# Patient Record
Sex: FEMALE | Race: WHITE | Employment: UNEMPLOYED | URBAN - METROPOLITAN AREA
[De-identification: names, ages, dates, MRNs, and addresses within clinical notes are randomized per-mention and may not be internally consistent; named-entity substitution may affect disease eponyms.]

---

## 2017-01-23 ENCOUNTER — PATIENT MESSAGE (OUTPATIENT)
Dept: FAMILY MEDICINE CLINIC | Facility: CLINIC | Age: 52
End: 2017-01-23

## 2017-01-23 RX ORDER — ALPRAZOLAM 0.5 MG/1
TABLET ORAL 2 TIMES DAILY PRN
Qty: 30 TABLET | Refills: 2 | Status: SHIPPED | OUTPATIENT
Start: 2017-01-23 | End: 2017-03-22

## 2017-01-23 NOTE — TELEPHONE ENCOUNTER
----- Message from My[facility] Generic sent at 1/23/2017  8:39 AM CST -----  Regarding: Non-Urgent Medical Question  Contact: [phone]  I am ready for my Xanax refill however I have to go through [facility] now.  It's located at [address] phone num

## 2017-02-09 DIAGNOSIS — I10 ESSENTIAL HYPERTENSION: Primary | ICD-10-CM

## 2017-02-09 DIAGNOSIS — E66.9 OBESITY (BMI 30.0-34.9): ICD-10-CM

## 2017-02-09 RX ORDER — LOSARTAN POTASSIUM 100 MG/1
100 TABLET ORAL DAILY
Qty: 30 TABLET | Refills: 0 | Status: SHIPPED | OUTPATIENT
Start: 2017-02-09 | End: 2017-03-09

## 2017-02-09 RX ORDER — HYDROCHLOROTHIAZIDE 12.5 MG/1
CAPSULE, GELATIN COATED ORAL
Refills: 1 | COMMUNITY
Start: 2017-01-25 | End: 2017-03-27

## 2017-02-09 NOTE — TELEPHONE ENCOUNTER
Losartan approved qty 30 NR  Darlyn,patient failed refill protocol because last labs completed 2014.   Please advise labs

## 2017-02-10 ENCOUNTER — TELEPHONE (OUTPATIENT)
Dept: FAMILY MEDICINE CLINIC | Facility: CLINIC | Age: 52
End: 2017-02-10

## 2017-02-10 NOTE — TELEPHONE ENCOUNTER
Pt is having a diverticulitis flare up and wants to make sure she doesn't need an antibiotic. She has been having left lower pain and pressure. Pt was not able to go to the bathroom so she gave herself an enema and is now going water.   Pt was switched to

## 2017-02-10 NOTE — TELEPHONE ENCOUNTER
Spoke to patient with instructions. She states that she seems to be better as the abdominal pressure seems to be better as she can feel the liquids coming out. She did make an appt for Tuesday 2/14 in case she does not get better.   If her symptoms do get

## 2017-02-10 NOTE — TELEPHONE ENCOUNTER
She would need to be seen if she has diverticulitis to be started on the right antibiotics may also need a CT scan if provider feels necessary.

## 2017-02-21 ENCOUNTER — HOSPITAL ENCOUNTER (OUTPATIENT)
Age: 52
Discharge: HOME | End: 2017-02-21
Attending: FAMILY MEDICINE
Payer: COMMERCIAL

## 2017-02-21 ENCOUNTER — APPOINTMENT (OUTPATIENT)
Dept: CT IMAGING | Age: 52
End: 2017-02-21
Attending: FAMILY MEDICINE
Payer: COMMERCIAL

## 2017-02-21 VITALS
BODY MASS INDEX: 28.17 KG/M2 | WEIGHT: 165 LBS | DIASTOLIC BLOOD PRESSURE: 87 MMHG | OXYGEN SATURATION: 97 % | RESPIRATION RATE: 18 BRPM | HEIGHT: 64 IN | HEART RATE: 88 BPM | SYSTOLIC BLOOD PRESSURE: 130 MMHG | TEMPERATURE: 99 F

## 2017-02-21 DIAGNOSIS — R11.0 NAUSEA: ICD-10-CM

## 2017-02-21 DIAGNOSIS — K57.92 ACUTE DIVERTICULITIS: Primary | ICD-10-CM

## 2017-02-21 DIAGNOSIS — R10.32 LLQ ABDOMINAL PAIN: ICD-10-CM

## 2017-02-21 DIAGNOSIS — E87.6 HYPOKALEMIA: ICD-10-CM

## 2017-02-21 LAB
#LYMPHOCYTE IC: 1.2 X10ˆ3/UL (ref 0.9–3.2)
#MXD IC: 0.7 X10ˆ3/UL (ref 0.1–1)
#NEUTROPHIL IC: 8.3 X10ˆ3/UL (ref 1.3–6.7)
CREAT SERPL-MCNC: 0.7 MG/DL (ref 0.4–1)
GLUCOSE BLD-MCNC: 133 MG/DL (ref 65–99)
HCT IC: 41.5 % (ref 37–54)
HGB IC: 15.1 G/DL (ref 11.7–16)
ISTAT BLOOD GAS TCO2: 24 MMOL/L (ref 22–32)
ISTAT BUN: <3 MG/DL (ref 8–20)
ISTAT CHLORIDE: 95 MMOL/L (ref 101–111)
ISTAT HEMATOCRIT: 42 % (ref 37–54)
ISTAT IONIZED CALCIUM: 1.13 MMOL/L (ref 1.12–1.32)
ISTAT POTASSIUM: 3.4 MMOL/L (ref 3.6–5.1)
ISTAT SODIUM: 135 MMOL/L (ref 136–144)
MCH IC: 34.7 PG (ref 27–33.2)
MCHC IC: 36.4 G/DL (ref 31–37)
MCV IC: 95.4 FL (ref 81–100)
PLT IC: 322 X10ˆ3/UL (ref 150–450)
POCT BILIRUBIN URINE: NEGATIVE
POCT GLUCOSE URINE: NEGATIVE MG/DL
POCT KETONE URINE: NEGATIVE MG/DL
POCT LEUKOCYTE ESTERASE URINE: NEGATIVE
POCT NITRITE URINE: NEGATIVE
POCT PH URINE: 6.5 (ref 5–8)
POCT PROTEIN URINE: NEGATIVE MG/DL
POCT SPECIFIC GRAVITY URINE: 1.01
POCT URINE COLOR: YELLOW
POCT UROBILINOGEN URINE: 0.2 MG/DL
RBC IC: 4.35 X10ˆ6/UL (ref 3.8–5.1)
WBC IC: 10.2 X10ˆ3/UL (ref 4–13)

## 2017-02-21 PROCEDURE — 85025 COMPLETE CBC W/AUTO DIFF WBC: CPT | Performed by: FAMILY MEDICINE

## 2017-02-21 PROCEDURE — 80047 BASIC METABLC PNL IONIZED CA: CPT

## 2017-02-21 PROCEDURE — 96361 HYDRATE IV INFUSION ADD-ON: CPT

## 2017-02-21 PROCEDURE — 96374 THER/PROPH/DIAG INJ IV PUSH: CPT

## 2017-02-21 PROCEDURE — 74176 CT ABD & PELVIS W/O CONTRAST: CPT

## 2017-02-21 PROCEDURE — 81002 URINALYSIS NONAUTO W/O SCOPE: CPT | Performed by: FAMILY MEDICINE

## 2017-02-21 PROCEDURE — 99215 OFFICE O/P EST HI 40 MIN: CPT

## 2017-02-21 PROCEDURE — 99214 OFFICE O/P EST MOD 30 MIN: CPT

## 2017-02-21 RX ORDER — POTASSIUM CHLORIDE 20 MEQ/1
20 TABLET, EXTENDED RELEASE ORAL ONCE
Status: COMPLETED | OUTPATIENT
Start: 2017-02-21 | End: 2017-02-21

## 2017-02-21 RX ORDER — METRONIDAZOLE 500 MG/1
500 TABLET ORAL 2 TIMES DAILY
Qty: 20 TABLET | Refills: 0 | Status: SHIPPED | OUTPATIENT
Start: 2017-02-21 | End: 2017-04-06 | Stop reason: ALTCHOICE

## 2017-02-21 RX ORDER — KETOROLAC TROMETHAMINE 30 MG/ML
30 INJECTION, SOLUTION INTRAMUSCULAR; INTRAVENOUS ONCE
Status: COMPLETED | OUTPATIENT
Start: 2017-02-21 | End: 2017-02-21

## 2017-02-21 RX ORDER — POTASSIUM CHLORIDE 20 MEQ/1
20 TABLET, EXTENDED RELEASE ORAL DAILY
Status: DISCONTINUED | OUTPATIENT
Start: 2017-02-21 | End: 2017-02-21

## 2017-02-21 RX ORDER — CIPROFLOXACIN 500 MG/1
500 TABLET, FILM COATED ORAL 2 TIMES DAILY
Qty: 20 TABLET | Refills: 0 | Status: SHIPPED | OUTPATIENT
Start: 2017-02-21 | End: 2017-04-06 | Stop reason: ALTCHOICE

## 2017-02-21 RX ORDER — SODIUM CHLORIDE 9 MG/ML
1000 INJECTION, SOLUTION INTRAVENOUS ONCE
Status: COMPLETED | OUTPATIENT
Start: 2017-02-21 | End: 2017-02-21

## 2017-02-21 RX ORDER — ONDANSETRON 4 MG/1
4 TABLET, ORALLY DISINTEGRATING ORAL ONCE
Status: COMPLETED | OUTPATIENT
Start: 2017-02-21 | End: 2017-02-21

## 2017-02-21 RX ORDER — POTASSIUM CHLORIDE 20 MEQ/1
20 TABLET, EXTENDED RELEASE ORAL DAILY
Qty: 7 TABLET | Refills: 0 | Status: SHIPPED | OUTPATIENT
Start: 2017-02-21 | End: 2017-04-06 | Stop reason: ALTCHOICE

## 2017-02-21 NOTE — ED NOTES
Pt back from ct  Warm blanket and pillow provided. Pt made comfortable. Bed on low fowlers. Lights dimmed. 1 side rail up. Call light within reach.

## 2017-02-21 NOTE — ED PROVIDER NOTES
Patient Seen in: THE [facility] In [facility]    History   Patient presents with:  Abdominal Pain    Stated Complaint: 2 WKS DIVERTICULITIS     HPI    This 49-year-old female presents to the office with left lower quadrant abdominal pain which ha Comment trauma to right leg    CHOLECYSTECTOMY      COLONOSCOPY         Medications :   Ciprofloxacin HCl (CIPRO) 500 MG Oral Tab,  Take 1 tablet (500 mg total) by mouth 2 (two) times daily. TAKE ONE TAB TWICE DAILY WITH FOOD.    metRONIDAZOLE (FLAGYL) 500 and vital signs reviewed. All other systems reviewed and negative except as noted above. PSFH elements reviewed from today and agreed except as otherwise stated in HPI.     Physical Exam       ED Triage Vitals   BP 02/21/17 0812 148/92 mmHg   Pulse limits   Ct Abdomen+pelvis(cpt=74176)    2/21/2017  PROCEDURE:  CT ABDOMEN+PELVIS(CPT=74176)  COMPARISON:  [facility] CT ABDOMEN+PELVIS(CPT=74176), 6/08/2016, 10:34.   INDICATIONS:  2 WKS DIVERTICULITIS  TECHNIQUE:  Unenhanced multislice CT scanning was p disease. OTHER:  Negative.        2/21/2017  CONCLUSION:  There is a 8 to 10centimeter segment of acute diverticulitis involving the distal descending colon and sigmoid colon with inflammation of the pericolonic fat with some phlegmonous formation without total) by mouth 2 (two) times daily. TAKE ONE TAB TWICE DAILY WITH FOOD. Qty: 20 tablet Refills: 0  Associated Diagnoses:Acute diverticulitis    metRONIDAZOLE (FLAGYL) 500 MG Oral Tab  Take 1 tablet (500 mg total) by mouth 2 (two) times daily.  DO NOT DRIN

## 2017-02-21 NOTE — ED INITIAL ASSESSMENT (HPI)
Abdomen- cramping pain on and off x 2 weeks; pt states she was on liquid diet. Feels nauseous, and has diarrhea; denies vomiting, fever, pt took prune juice and laxative.  Pt has h/o diverticulitis she she states she was seen here and was diagnosed with laura

## 2017-02-24 ENCOUNTER — OFFICE VISIT (OUTPATIENT)
Dept: FAMILY MEDICINE CLINIC | Facility: CLINIC | Age: 52
End: 2017-02-24

## 2017-02-24 VITALS
WEIGHT: 170 LBS | RESPIRATION RATE: 16 BRPM | BODY MASS INDEX: 28.67 KG/M2 | HEART RATE: 96 BPM | SYSTOLIC BLOOD PRESSURE: 130 MMHG | DIASTOLIC BLOOD PRESSURE: 84 MMHG | HEIGHT: 64.5 IN

## 2017-02-24 DIAGNOSIS — K57.32 DIVERTICULITIS OF LARGE INTESTINE WITHOUT PERFORATION OR ABSCESS WITHOUT BLEEDING: Primary | ICD-10-CM

## 2017-02-24 PROCEDURE — 99213 OFFICE O/P EST LOW 20 MIN: CPT | Performed by: FAMILY MEDICINE

## 2017-02-24 NOTE — PROGRESS NOTES
Tri Saini is a 46year old female here for Patient presents with:  Urgent Care F/u: The patient went to the immediate care on 02/21/2017 for diverticulitis. The patient needs a note to return to work on 02/27/2017.       HPI:     Diverticulitis  -d Status: Never Used                        Alcohol Use: No                 Medications (Active prior to today's visit):    Current Outpatient Prescriptions:  Ciprofloxacin HCl (CIPRO) 500 MG Oral Tab Take 1 tablet (500 mg total) by mouth 2 (two) times daily dane, +BS  Ext: full ROM     ASSESSMENT/PLAN:     Diverticulitis  -resolving  -complete abx as prescribed  -advance diet as tolerated  -cleared to return to work next week  -f/u prn    2/24/2017  Torito Holcomb MD

## 2017-03-09 RX ORDER — LOSARTAN POTASSIUM 100 MG/1
TABLET ORAL
Qty: 30 TABLET | Refills: 0 | Status: SHIPPED | OUTPATIENT
Start: 2017-03-09 | End: 2017-04-05

## 2017-03-22 NOTE — TELEPHONE ENCOUNTER
[id number] Leila Fried with this refill? Last seen 2/2017 with Dr. Artis Cohn and prior to that in Nov. 2016 with you    Last refill  1/23/17 for 30 with 2 refills.

## 2017-03-23 RX ORDER — ALPRAZOLAM 0.5 MG/1
TABLET ORAL 2 TIMES DAILY PRN
Qty: 30 TABLET | Refills: 0 | Status: SHIPPED | OUTPATIENT
Start: 2017-03-23 | End: 2017-04-06

## 2017-03-23 NOTE — TELEPHONE ENCOUNTER
Musa Aguilar approved #30. Pt needs appt for any further refills. Med called to pharmacy and pt informed.   appt made for 4/6/17

## 2017-03-27 RX ORDER — HYDROCHLOROTHIAZIDE 12.5 MG/1
CAPSULE, GELATIN COATED ORAL
Qty: 30 CAPSULE | Refills: 0 | Status: SHIPPED | OUTPATIENT
Start: 2017-03-27 | End: 2017-04-06

## 2017-03-27 NOTE — TELEPHONE ENCOUNTER
HCTZ approved qty 30 NR  Patient overdue to complete labs  Overdue result letter has been mailed to patient

## 2017-04-05 RX ORDER — LOSARTAN POTASSIUM 100 MG/1
TABLET ORAL
Qty: 30 TABLET | Refills: 0 | Status: SHIPPED | OUTPATIENT
Start: 2017-04-05 | End: 2017-04-06

## 2017-04-06 NOTE — PROGRESS NOTES
Gloria Palmer is a 46year old female. HPI:   Patient presents with: Anxiety: Rm. 8: Alprazolam   patient is in for follow-up on anxiety. Does state that the anxiety has been worse again due to life stressors.   Is still going through issues 2 years mmol/L 24       Current Outpatient Prescriptions:  BusPIRone HCl 10 MG Oral Tab Take 5 mg twice daily for 5 days then increase to 10 mg for 5 days if needed after 5 days increase to 15 mg twice daily Disp: 60 tablet Rfl: 3   ALPRAZolam 0.5 MG Oral Tab Take shortness of breath with exertion  CARDIOVASCULAR: denies chest pain on exertion  GI: denies abdominal pain and denies heartburn  NEURO: denies headaches  Musculoskeletal: No motor deficits  psych see above  EXAM:   /88 mmHg  Pulse 84  Ht 64.5\"  Wt follow-up in the office in 3 months to assess success on BuSpar call office sooner if side effects or if no improvement.   Continue with Lexapro 20 mg daily  Discussed if any problems call office immediately especially if gets increased depression, anxiety

## 2017-04-09 ENCOUNTER — HOSPITAL ENCOUNTER (EMERGENCY)
Age: 52
Discharge: LEFT BEFORE DISCHARGE | End: 2017-04-09
Attending: EMERGENCY MEDICINE
Payer: COMMERCIAL

## 2017-04-09 ENCOUNTER — TELEPHONE (OUTPATIENT)
Dept: FAMILY MEDICINE CLINIC | Facility: CLINIC | Age: 52
End: 2017-04-09

## 2017-04-09 ENCOUNTER — APPOINTMENT (OUTPATIENT)
Dept: CT IMAGING | Age: 52
End: 2017-04-09
Attending: EMERGENCY MEDICINE
Payer: COMMERCIAL

## 2017-04-09 VITALS
SYSTOLIC BLOOD PRESSURE: 140 MMHG | RESPIRATION RATE: 16 BRPM | TEMPERATURE: 98 F | HEART RATE: 96 BPM | OXYGEN SATURATION: 96 % | DIASTOLIC BLOOD PRESSURE: 93 MMHG | WEIGHT: 167 LBS | BODY MASS INDEX: 28.51 KG/M2 | HEIGHT: 64 IN

## 2017-04-09 DIAGNOSIS — K57.32 DIVERTICULITIS OF LARGE INTESTINE WITHOUT PERFORATION OR ABSCESS WITHOUT BLEEDING: Primary | ICD-10-CM

## 2017-04-09 PROCEDURE — 96374 THER/PROPH/DIAG INJ IV PUSH: CPT

## 2017-04-09 PROCEDURE — 74177 CT ABD & PELVIS W/CONTRAST: CPT

## 2017-04-09 PROCEDURE — 81025 URINE PREGNANCY TEST: CPT

## 2017-04-09 PROCEDURE — 99285 EMERGENCY DEPT VISIT HI MDM: CPT

## 2017-04-09 PROCEDURE — 81003 URINALYSIS AUTO W/O SCOPE: CPT | Performed by: EMERGENCY MEDICINE

## 2017-04-09 PROCEDURE — 80053 COMPREHEN METABOLIC PANEL: CPT | Performed by: EMERGENCY MEDICINE

## 2017-04-09 PROCEDURE — 99284 EMERGENCY DEPT VISIT MOD MDM: CPT

## 2017-04-09 PROCEDURE — 85730 THROMBOPLASTIN TIME PARTIAL: CPT | Performed by: EMERGENCY MEDICINE

## 2017-04-09 PROCEDURE — 85610 PROTHROMBIN TIME: CPT | Performed by: EMERGENCY MEDICINE

## 2017-04-09 PROCEDURE — 85025 COMPLETE CBC W/AUTO DIFF WBC: CPT | Performed by: EMERGENCY MEDICINE

## 2017-04-09 PROCEDURE — 96361 HYDRATE IV INFUSION ADD-ON: CPT

## 2017-04-09 RX ORDER — METRONIDAZOLE 500 MG/1
500 TABLET ORAL 3 TIMES DAILY
Qty: 30 TABLET | Refills: 0 | Status: SHIPPED | OUTPATIENT
Start: 2017-04-09 | End: 2017-04-19

## 2017-04-09 RX ORDER — DICYCLOMINE HCL 20 MG
20 TABLET ORAL 4 TIMES DAILY PRN
Qty: 15 TABLET | Refills: 0 | Status: SHIPPED | OUTPATIENT
Start: 2017-04-09 | End: 2017-10-03 | Stop reason: ALTCHOICE

## 2017-04-09 RX ORDER — CIPROFLOXACIN 500 MG/1
500 TABLET, FILM COATED ORAL 2 TIMES DAILY
Qty: 20 TABLET | Refills: 0 | Status: SHIPPED | OUTPATIENT
Start: 2017-04-09 | End: 2017-04-19

## 2017-04-09 RX ORDER — POTASSIUM CHLORIDE 20 MEQ/1
40 TABLET, EXTENDED RELEASE ORAL ONCE
Status: COMPLETED | OUTPATIENT
Start: 2017-04-09 | End: 2017-04-09

## 2017-04-09 RX ORDER — SODIUM CHLORIDE 9 MG/ML
INJECTION, SOLUTION INTRAVENOUS ONCE
Status: COMPLETED | OUTPATIENT
Start: 2017-04-09 | End: 2017-04-09

## 2017-04-09 RX ORDER — KETOROLAC TROMETHAMINE 30 MG/ML
30 INJECTION, SOLUTION INTRAMUSCULAR; INTRAVENOUS ONCE
Status: COMPLETED | OUTPATIENT
Start: 2017-04-09 | End: 2017-04-09

## 2017-04-09 NOTE — TELEPHONE ENCOUNTER
Dr. Warren Camacho on call for [facility]:  Pt called from [phone] with concerns for diverticular attack and wanted advice as to what to do. Pt advised since it is a Sunday, she is to go to ER closest to her.    Explained diverticula can rupture and

## 2017-04-09 NOTE — ED PROVIDER NOTES
[facility] [place]    History   Patient presents with:  Abdomen/Flank Pain (GI/)    Stated Complaint: ABD PAIN    HPI    Patient is a 14-year-old female who presents emergency room with a history of left lower qu Medications :   Ciprofloxacin HCl 500 MG Oral Tab,  Take 1 tablet (500 mg total) by mouth 2 (two) times daily. metRONIDAZOLE 500 MG Oral Tab,  Take 1 tablet (500 mg total) by mouth 3 (three) times daily.    Dicyclomine HCl 20 MG Oral Tab,  Take 1 ta Exam       ED Triage Vitals   BP 04/09/17 1332 156/100 mmHg   Pulse 04/09/17 1332 100   Resp 04/09/17 1332 20   Temp 04/09/17 1332 97.7 °F (36.5 °C)   Temp src 04/09/17 1332 Oral   SpO2 04/09/17 1332 97 %   O2 Device 04/09/17 1332 None (Room air)       Cur within normal limits   CBC W/ DIFFERENTIAL - Abnormal; Notable for the following:     MCH 35.2 (*)     MCHC 37.1 (*)     Neutrophil Absolute Prelim 9.89 (*)     Neutrophil Absolute 9.89 (*)     Monocyte Absolute 0.63 (*)     All other components within nor even death. The patient was discharged home [address] at this time. The patient will be discharged home will follow-up with gastroenterology this week and Dr. Matthias Stubbs, will  help make arrangements for follow-up this week.   Instructed that the

## 2017-04-10 NOTE — TELEPHONE ENCOUNTER
Spoke to patient and she said that the ER doctor wants her to set up an appt with GI/Dr. Bradly Beckman this week. Also, gave her the number to Dr. Kalani Vargas as well.

## 2017-04-10 NOTE — TELEPHONE ENCOUNTER
Refer her to general surgery Dr Neela Spears she may need resection due to the # of diverticulitis she had this year he can do colonoscopy and also the surgery if it is needed.

## 2017-04-24 RX ORDER — OMEPRAZOLE 20 MG/1
CAPSULE, DELAYED RELEASE ORAL
Qty: 30 CAPSULE | Refills: 0 | Status: SHIPPED | OUTPATIENT
Start: 2017-04-24 | End: 2017-05-17

## 2017-04-26 RX ORDER — TRAZODONE HYDROCHLORIDE 150 MG/1
TABLET ORAL
Qty: 30 TABLET | Refills: 2 | Status: SHIPPED | OUTPATIENT
Start: 2017-04-26 | End: 2017-07-27

## 2017-05-01 ENCOUNTER — PATIENT MESSAGE (OUTPATIENT)
Dept: FAMILY MEDICINE CLINIC | Facility: CLINIC | Age: 52
End: 2017-05-01

## 2017-05-01 RX ORDER — ESCITALOPRAM OXALATE 20 MG/1
TABLET ORAL
Qty: 90 TABLET | Refills: 0 | Status: SHIPPED | OUTPATIENT
Start: 2017-05-01 | End: 2017-08-08

## 2017-05-02 RX ORDER — LOSARTAN POTASSIUM 100 MG/1
TABLET ORAL
Qty: 30 TABLET | Refills: 0 | OUTPATIENT
Start: 2017-05-02

## 2017-05-04 RX ORDER — ALPRAZOLAM 0.5 MG/1
TABLET ORAL 2 TIMES DAILY PRN
Qty: 45 TABLET | Refills: 0 | OUTPATIENT
Start: 2017-05-04 | End: 2017-05-25

## 2017-05-04 NOTE — TELEPHONE ENCOUNTER
----- Message from Rolo Willson PA-C sent at 5/4/2017  2:57 PM CDT -----  Regarding: FW: Non-Urgent Medical Question  Contact: [phone]  Refill the Xanax for the next month #45  ----- Message -----     From: Caprice Franks RN     Sent: 5/4/2017   7: Jeovanny Postin     Sent: 5/1/2017 12:47 PM CDT       To: Leeann Escobar PA-C  Subject: Non-Urgent Medical Question    Tell Darshan Alvarez the pills she gave me to try and come of. Xanax  do not work. I was crawling out of my skin.  Xanax doesn't make me f

## 2017-05-04 NOTE — TELEPHONE ENCOUNTER
Med called to pharmacy. Pharmacy to notify pt when ready    Ok to discard the paper copy once approved. Thanks.

## 2017-05-05 RX ORDER — ALPRAZOLAM 0.5 MG/1
TABLET ORAL 2 TIMES DAILY PRN
Qty: 45 TABLET | Refills: 0 | OUTPATIENT
Start: 2017-05-05

## 2017-05-18 RX ORDER — OMEPRAZOLE 20 MG/1
CAPSULE, DELAYED RELEASE ORAL
Qty: 30 CAPSULE | Refills: 2 | Status: SHIPPED | OUTPATIENT
Start: 2017-05-18 | End: 2017-08-23

## 2017-05-25 RX ORDER — ALPRAZOLAM 0.5 MG/1
TABLET ORAL 2 TIMES DAILY PRN
Qty: 45 TABLET | Refills: 0 | Status: SHIPPED | OUTPATIENT
Start: 2017-05-25 | End: 2017-06-15

## 2017-05-25 NOTE — TELEPHONE ENCOUNTER
From: Valentina Fish  To:  Del Hunter PA-C  Sent: 5/25/2017 7:56 AM CDT  Subject: Medication Renewal Request    Original authorizing provider: DAPHNE Gutierrez would like a refill of the following medications:  Ramon Chimera

## 2017-06-15 RX ORDER — ALPRAZOLAM 0.5 MG/1
TABLET ORAL 2 TIMES DAILY PRN
Qty: 45 TABLET | Refills: 0 | Status: SHIPPED | OUTPATIENT
Start: 2017-06-18 | End: 2017-07-17

## 2017-06-15 NOTE — TELEPHONE ENCOUNTER
I thought that was suppose to last a month is she taking it twice daily ? I really don't like to RX it that way it is suppose to be lowest dose possible.  What is the lowest possible she can do??   Clearify with her ok to refill for #45 but tell her it is

## 2017-06-15 NOTE — TELEPHONE ENCOUNTER
Spoke to patient and she said she has been taking it twice a day and that she usually take 2 in the AM and then she is good. She said she actually has 3 days left and will run out on 6/18.   Explained to patient that this next refill will be called in with

## 2017-07-17 RX ORDER — ALPRAZOLAM 0.5 MG/1
TABLET ORAL 2 TIMES DAILY PRN
Qty: 45 TABLET | Refills: 0
Start: 2017-07-17 | End: 2017-08-16

## 2017-07-17 NOTE — TELEPHONE ENCOUNTER
From: Carey Freitas<BR />Sent: 7/17/2017 12:38 PM CDT<BR />Subject: Medication Renewal Request<BR /><BR />June Son would like a refill of the following medications:<BR />ALPRAZolam 0.5 MG Oral Tab KENDRICK Saab<BR /><BR />Preferr

## 2017-07-27 RX ORDER — TRAZODONE HYDROCHLORIDE 150 MG/1
TABLET ORAL
Qty: 30 TABLET | Refills: 0 | Status: SHIPPED | OUTPATIENT
Start: 2017-07-27 | End: 2017-08-23

## 2017-07-27 NOTE — TELEPHONE ENCOUNTER
Trazodone approved qty 30 NR  Patient past due for a 3 month f/u for anxiety  Please call to schedule appt  [phone] (home)

## 2017-08-08 ENCOUNTER — TELEPHONE (OUTPATIENT)
Dept: FAMILY MEDICINE CLINIC | Facility: CLINIC | Age: 52
End: 2017-08-08

## 2017-08-08 RX ORDER — ESCITALOPRAM OXALATE 20 MG/1
20 TABLET ORAL
Qty: 90 TABLET | Refills: 0 | Status: SHIPPED | OUTPATIENT
Start: 2017-08-08 | End: 2017-11-17

## 2017-08-16 RX ORDER — ALPRAZOLAM 0.5 MG/1
TABLET ORAL 2 TIMES DAILY PRN
Qty: 45 TABLET | Refills: 0
Start: 2017-08-16 | End: 2017-09-14

## 2017-08-16 NOTE — TELEPHONE ENCOUNTER
From: Damaris Chapman  Sent: 8/16/2017 12:39 PM CDT  Subject: Medication Renewal Request    Damaris Chapman would like a refill of the following medications:  ALPRAZolam 0.5 MG Oral Tab Nury Downey PA-C]    Preferred pharmacy: [facility]

## 2017-08-21 NOTE — ED PROVIDER NOTES
Patient Seen in: [facility] Emergency Department In [place]    History   Patient presents with:   Other    Stated Complaint:     HPI    51-year-old female complaining of weakness the patient states that last night she vomited once before she went to bed sh Dr. Sandie Barboza; Due 5/14/2014    Medications :   ALPRAZolam 0.5 MG Oral Tab,  Take 0.5-1 tablets (0.25-0.5 mg total) by mouth 2 (two) times daily as needed for Anxiety. escitalopram 20 MG Oral Tab,  Take 1 tablet (20 mg total) by mouth once daily.    TR src: Temporal  SpO2: 95 %  O2 Device: None (Room air)    Current:/70   Pulse 75   Temp 98.6 °F (37 °C) (Temporal)   Resp 20   Ht 162.6 cm (5' 4\")   Wt 70.8 kg   SpO2 99%   BMI 26.78 kg/m²         Physical Exam    Alert and oriented ×3 in no acute di All other components within normal limits   CBC WITH DIFFERENTIAL WITH PLATELET    Narrative: The following orders were created for panel order CBC WITH DIFFERENTIAL WITH PLATELET.   Procedure                               Abnormality         Status [address]  [phone]    In 3 days        Medications Prescribed:  Discharge Medication List as of 8/21/2017  4:53 PM

## 2017-08-21 NOTE — ED INITIAL ASSESSMENT (HPI)
PT STATES THIS AM AND FELT LIKE BP WAS HIGH, FELT ANXIOUS AND GOT OUT OF BED AND FELT LIGHTHEADED AND STATES HAD SYNCOPAL EPISODE. NOW FEELING TIRED

## 2017-08-23 RX ORDER — OMEPRAZOLE 20 MG/1
CAPSULE, DELAYED RELEASE ORAL
Qty: 30 CAPSULE | Refills: 0 | Status: SHIPPED | OUTPATIENT
Start: 2017-08-23 | End: 2017-09-27

## 2017-08-23 RX ORDER — TRAZODONE HYDROCHLORIDE 150 MG/1
TABLET ORAL
Qty: 30 TABLET | Refills: 0 | Status: SHIPPED | OUTPATIENT
Start: 2017-08-23 | End: 2017-09-25

## 2017-08-29 ENCOUNTER — TELEPHONE (OUTPATIENT)
Dept: FAMILY MEDICINE CLINIC | Facility: CLINIC | Age: 52
End: 2017-08-29

## 2017-08-29 RX ORDER — ALBUTEROL SULFATE 90 UG/1
2 AEROSOL, METERED RESPIRATORY (INHALATION) EVERY 6 HOURS PRN
Qty: 18 G | Refills: 0 | Status: SHIPPED | OUTPATIENT
Start: 2017-08-29 | End: 2017-09-20

## 2017-08-29 NOTE — TELEPHONE ENCOUNTER
Per phone consent left message for patient that inhaler has been sent to her pharmacy and to also use plain mucinex OTC  Asked patient to call office with any questions she may have

## 2017-08-29 NOTE — TELEPHONE ENCOUNTER
Pt called and said she has burning in her chest when she coughs. She said she knows it will go into bronchitis and she wants to know if she can get something called in before her appt on Thursday.   She said she talked to [facility] yesterday but she woke up

## 2017-08-29 NOTE — TELEPHONE ENCOUNTER
Vincenzo Meredith you please recommend something that the patient can take OTC until she is seen on Thursday

## 2017-08-31 PROBLEM — F10.20 ALCOHOLIC (HCC): Status: ACTIVE | Noted: 2017-08-31

## 2017-08-31 NOTE — PROGRESS NOTES
Raquel Child is a 46year old female. HPI:   Patient presents for recheck of her hypertension.    Pt has been taking medications as instructed, no medication side effects, home BP monitoring not done  BP not taken high stress she is selling her ho 5.1 mmol/L   Chloride 92 (L) 101 - 111 mmol/L   CO2 25.0 22.0 - 32.0 mmol/L   -ETHYL ALCOHOL   Result Value Ref Range   Ethyl Alcohol 326 (H) <3 mg/dL   -D-DIMER   Result Value Ref Range   D-Dimer 0.52 (H) 0.00 - 0.49 ug/mL FEU   -RBC MORPHOLOGY SCAN   Res Albuterol Sulfate  (90 Base) MCG/ACT Inhalation Aero Soln Inhale 2 puffs into the lungs every 6 (six) hours as needed for Wheezing.  Disp: 18 g Rfl: 0   OMEPRAZOLE 20 MG Oral Capsule Delayed Release TAKE 1 CAPSULE BY MOUTH EVERY MORNING Disp: 30 ca ENDOSCOPY,BIOPSY      Comment: Dr. Tammie Bell; Due 5/14/2014   Social History:    Smoking status: Current Every Day Smoker                                                   Packs/day: 0.75      Years: 29.00        Types: Cigarettes  Smokeless tobacco: Ne repeated. GENERAL: normal communication, well developed, well nourished and in no apparent distress  SKIN: no visible rashes  HEENT ears clear bilaterally nose no congestion throat clear postnasal drainage no sinus pain with palpation of sinuses.   HEAD: a PANEL (14); Future    4. Acute bronchitis, unspecified organism  Flovent 2 puffs twice a day rinse mouth after use albuterol every 4 hours smoking cessation  Continue with Mucinex OTC    5. Essential hypertension  - COMP METABOLIC PANEL (14);  Future  - LIP

## 2017-09-07 ENCOUNTER — APPOINTMENT (OUTPATIENT)
Dept: GENERAL RADIOLOGY | Age: 52
End: 2017-09-07
Attending: EMERGENCY MEDICINE
Payer: COMMERCIAL

## 2017-09-07 ENCOUNTER — HOSPITAL ENCOUNTER (EMERGENCY)
Age: 52
Discharge: HOME | End: 2017-09-07
Attending: EMERGENCY MEDICINE
Payer: COMMERCIAL

## 2017-09-07 VITALS
DIASTOLIC BLOOD PRESSURE: 67 MMHG | HEIGHT: 64 IN | BODY MASS INDEX: 25.95 KG/M2 | OXYGEN SATURATION: 97 % | SYSTOLIC BLOOD PRESSURE: 102 MMHG | HEART RATE: 95 BPM | WEIGHT: 152 LBS | TEMPERATURE: 97 F | RESPIRATION RATE: 16 BRPM

## 2017-09-07 DIAGNOSIS — J40 BRONCHITIS: Primary | ICD-10-CM

## 2017-09-07 DIAGNOSIS — J98.01 ACUTE BRONCHOSPASM: ICD-10-CM

## 2017-09-07 PROCEDURE — 71020 XR CHEST PA + LAT CHEST (CPT=71020): CPT | Performed by: EMERGENCY MEDICINE

## 2017-09-07 PROCEDURE — 99283 EMERGENCY DEPT VISIT LOW MDM: CPT | Performed by: EMERGENCY MEDICINE

## 2017-09-07 PROCEDURE — 94640 AIRWAY INHALATION TREATMENT: CPT | Performed by: EMERGENCY MEDICINE

## 2017-09-07 PROCEDURE — 94640 AIRWAY INHALATION TREATMENT: CPT

## 2017-09-07 RX ORDER — IPRATROPIUM BROMIDE AND ALBUTEROL SULFATE 2.5; .5 MG/3ML; MG/3ML
3 SOLUTION RESPIRATORY (INHALATION) ONCE
Status: COMPLETED | OUTPATIENT
Start: 2017-09-07 | End: 2017-09-07

## 2017-09-07 RX ORDER — PREDNISONE 20 MG/1
60 TABLET ORAL ONCE
Status: COMPLETED | OUTPATIENT
Start: 2017-09-07 | End: 2017-09-07

## 2017-09-07 RX ORDER — PREDNISONE 20 MG/1
40 TABLET ORAL DAILY
Qty: 10 TABLET | Refills: 0 | Status: SHIPPED | OUTPATIENT
Start: 2017-09-07 | End: 2017-09-12

## 2017-09-07 RX ORDER — CLARITHROMYCIN 500 MG/1
500 TABLET, COATED ORAL 2 TIMES DAILY
Qty: 20 TABLET | Refills: 0 | Status: SHIPPED | OUTPATIENT
Start: 2017-09-07 | End: 2017-09-17

## 2017-09-07 NOTE — ED PROVIDER NOTES
Patient Seen in: [facility] Emergency Department In [place]    History   Patient presents with:  Dyspnea ROSA SOB (respiratory)    Stated Complaint: cough/feeling tired    HPI    Patient is a 77-year-old female who presents emergency room with a history of Disorder Mother      Crohns   • Cancer Maternal Grandmother      ovarian cancer   • Gastro-Intestinal Disorder Sister      diverticulitis   • Cancer Sister      cervical cancer   • Colon Cancer Maternal Grandfather        Smoking status: Current Every Day hernia. EXTREMITIES: There is no cyanosis, clubbing, or edema appreciated. Pulses are 2+ and equal in all 4 extremities. NEURO: Patient is awake, alert and oriented to time place and person. Motor strength is 5 over 5 in all 4 extremities.  There are no g R-0

## 2017-09-08 ENCOUNTER — HOSPITAL ENCOUNTER (EMERGENCY)
Age: 52
Discharge: HOME | End: 2017-09-08
Attending: EMERGENCY MEDICINE
Payer: COMMERCIAL

## 2017-09-08 ENCOUNTER — TELEPHONE (OUTPATIENT)
Dept: FAMILY MEDICINE CLINIC | Facility: CLINIC | Age: 52
End: 2017-09-08

## 2017-09-08 ENCOUNTER — PATIENT MESSAGE (OUTPATIENT)
Dept: FAMILY MEDICINE CLINIC | Facility: CLINIC | Age: 52
End: 2017-09-08

## 2017-09-08 VITALS
SYSTOLIC BLOOD PRESSURE: 128 MMHG | HEIGHT: 64 IN | OXYGEN SATURATION: 96 % | HEART RATE: 101 BPM | RESPIRATION RATE: 24 BRPM | TEMPERATURE: 98 F | BODY MASS INDEX: 25.95 KG/M2 | WEIGHT: 152 LBS | DIASTOLIC BLOOD PRESSURE: 93 MMHG

## 2017-09-08 DIAGNOSIS — J98.01 ACUTE BRONCHOSPASM: Primary | ICD-10-CM

## 2017-09-08 DIAGNOSIS — R11.10 NON-INTRACTABLE VOMITING, PRESENCE OF NAUSEA NOT SPECIFIED, UNSPECIFIED VOMITING TYPE: ICD-10-CM

## 2017-09-08 DIAGNOSIS — F10.10 ALCOHOL ABUSE: ICD-10-CM

## 2017-09-08 LAB
ALBUMIN SERPL-MCNC: 4.5 G/DL (ref 3.5–4.8)
ALP LIVER SERPL-CCNC: 117 U/L (ref 41–108)
ALT SERPL-CCNC: 94 U/L (ref 14–54)
AST SERPL-CCNC: 114 U/L (ref 15–41)
BASOPHILS # BLD AUTO: 0.02 X10(3) UL (ref 0–0.1)
BASOPHILS NFR BLD AUTO: 0.2 %
BILIRUB SERPL-MCNC: 1 MG/DL (ref 0.1–2)
BUN BLD-MCNC: 10 MG/DL (ref 8–20)
CALCIUM BLD-MCNC: 8.6 MG/DL (ref 8.3–10.3)
CHLORIDE: 89 MMOL/L (ref 101–111)
CO2: 24 MMOL/L (ref 22–32)
CREAT BLD-MCNC: 0.77 MG/DL (ref 0.55–1.02)
EOSINOPHIL # BLD AUTO: 0.01 X10(3) UL (ref 0–0.3)
EOSINOPHIL NFR BLD AUTO: 0.1 %
ERYTHROCYTE [DISTWIDTH] IN BLOOD BY AUTOMATED COUNT: 11.5 % (ref 11.5–16)
ETHYL ALCOHOL: 245 MG/DL (ref ?–3)
GLUCOSE BLD-MCNC: 102 MG/DL (ref 70–99)
HCT VFR BLD AUTO: 45.1 % (ref 34–50)
HGB BLD-MCNC: 16.4 G/DL (ref 12–16)
IMMATURE GRANULOCYTE COUNT: 0.02 X10(3) UL (ref 0–1)
IMMATURE GRANULOCYTE RATIO %: 0.2 %
LYMPHOCYTES # BLD AUTO: 1.72 X10(3) UL (ref 0.9–4)
LYMPHOCYTES NFR BLD AUTO: 19.8 %
M PROTEIN MFR SERPL ELPH: 7.6 G/DL (ref 6.1–8.3)
MCH RBC QN AUTO: 35 PG (ref 27–33.2)
MCHC RBC AUTO-ENTMCNC: 36.4 G/DL (ref 31–37)
MCV RBC AUTO: 96.4 FL (ref 81–100)
MONOCYTES # BLD AUTO: 0.57 X10(3) UL (ref 0.1–0.6)
MONOCYTES NFR BLD AUTO: 6.6 %
NEUTROPHIL ABS PRELIM: 6.33 X10 (3) UL (ref 1.3–6.7)
NEUTROPHILS # BLD AUTO: 6.33 X10(3) UL (ref 1.3–6.7)
NEUTROPHILS NFR BLD AUTO: 73.1 %
PLATELET # BLD AUTO: 246 10(3)UL (ref 150–450)
POTASSIUM SERPL-SCNC: 3.6 MMOL/L (ref 3.6–5.1)
RBC # BLD AUTO: 4.68 X10(6)UL (ref 3.8–5.1)
RED CELL DISTRIBUTION WIDTH-SD: 40.5 FL (ref 35.1–46.3)
SODIUM SERPL-SCNC: 130 MMOL/L (ref 136–144)
WBC # BLD AUTO: 8.7 X10(3) UL (ref 4–13)

## 2017-09-08 PROCEDURE — 85025 COMPLETE CBC W/AUTO DIFF WBC: CPT | Performed by: EMERGENCY MEDICINE

## 2017-09-08 PROCEDURE — 80053 COMPREHEN METABOLIC PANEL: CPT | Performed by: EMERGENCY MEDICINE

## 2017-09-08 PROCEDURE — 99283 EMERGENCY DEPT VISIT LOW MDM: CPT

## 2017-09-08 PROCEDURE — 96361 HYDRATE IV INFUSION ADD-ON: CPT

## 2017-09-08 PROCEDURE — 99284 EMERGENCY DEPT VISIT MOD MDM: CPT

## 2017-09-08 PROCEDURE — 94640 AIRWAY INHALATION TREATMENT: CPT

## 2017-09-08 PROCEDURE — 96374 THER/PROPH/DIAG INJ IV PUSH: CPT

## 2017-09-08 PROCEDURE — 80320 DRUG SCREEN QUANTALCOHOLS: CPT | Performed by: EMERGENCY MEDICINE

## 2017-09-08 RX ORDER — SODIUM CHLORIDE 9 MG/ML
INJECTION, SOLUTION INTRAVENOUS ONCE
Status: COMPLETED | OUTPATIENT
Start: 2017-09-08 | End: 2017-09-08

## 2017-09-08 RX ORDER — IPRATROPIUM BROMIDE AND ALBUTEROL SULFATE 2.5; .5 MG/3ML; MG/3ML
3 SOLUTION RESPIRATORY (INHALATION) ONCE
Status: COMPLETED | OUTPATIENT
Start: 2017-09-08 | End: 2017-09-08

## 2017-09-08 RX ORDER — ONDANSETRON 2 MG/ML
4 INJECTION INTRAMUSCULAR; INTRAVENOUS ONCE
Status: COMPLETED | OUTPATIENT
Start: 2017-09-08 | End: 2017-09-08

## 2017-09-08 RX ORDER — ONDANSETRON 4 MG/1
4 TABLET, ORALLY DISINTEGRATING ORAL EVERY 4 HOURS PRN
Qty: 10 TABLET | Refills: 0 | Status: SHIPPED | OUTPATIENT
Start: 2017-09-08 | End: 2017-09-15

## 2017-09-08 NOTE — ED INITIAL ASSESSMENT (HPI)
Cough/ROSA since Tuesday. Seen here yesterday and started antibiotic and prednisone. Sts she is not feeling any better. No fevers. Pt c/o vomiting x 1.

## 2017-09-08 NOTE — TELEPHONE ENCOUNTER
Monico Mathew is calling to let Lizzie Hortensia know that she sent a message through my chart she needs a note for work on Monday, or she may be fired because she is on no paid leave, please send letter to Monico Mathew through her e-mail on my chart message, any questions p

## 2017-09-08 NOTE — ED PROVIDER NOTES
Patient Seen in: [address] [facility] In [place]    History   Patient presents with:  Dyspnea BILL SOB (respiratory)    Stated Complaint: bill/cough- was seen here yesterday. on prednisone and antibiotic.     HPI    Patient is a 60-year-old female Comment: Dr. Candido Anthony; Due 5/14/2014    Family History   Problem Relation Age of Onset   • Diabetes Father    • Hypertension Father    • Hypertension Mother    • Gastro-Intestinal Disorder Mother      Crohns   • Cancer Maternal Grandmother      ovarian murmur. ABDOMEN: There is no focal tenderness to palpation appreciated anywhere throughout the abdomen. There is no guarding, no rebound, no mass, and no organomegaly appreciated. There is normoactive bowel sounds. There is no hernia.   EXTREMITIES: There a normal CBC, chemistries, BUN/creatinine, and blood sugar except for evidence of some mild hyponatremia which the patient has been known to have previously and is actually improved today from previous. Patient's alcohol level is 245.   Patient liver funct

## 2017-09-14 RX ORDER — ALPRAZOLAM 0.5 MG/1
TABLET ORAL 2 TIMES DAILY PRN
Qty: 45 TABLET | Refills: 0 | Status: SHIPPED | OUTPATIENT
Start: 2017-09-14 | End: 2017-10-17

## 2017-09-14 NOTE — TELEPHONE ENCOUNTER
From: Valentina Fish  Sent: 9/14/2017 10:25 AM CDT  Subject: Medication Renewal Request    Valentina Fish would like a refill of the following medications:     ALPRAZolam 0.5 MG Oral Tab Magaly Boogie PA-C]    Preferred pharmacy: [facility]

## 2017-09-20 RX ORDER — ALBUTEROL SULFATE 90 UG/1
AEROSOL, METERED RESPIRATORY (INHALATION)
Qty: 18 G | Refills: 0 | Status: SHIPPED | OUTPATIENT
Start: 2017-09-20 | End: 2017-10-03 | Stop reason: ALTCHOICE

## 2017-09-25 RX ORDER — TRAZODONE HYDROCHLORIDE 150 MG/1
TABLET ORAL
Qty: 30 TABLET | Refills: 0 | Status: SHIPPED | OUTPATIENT
Start: 2017-09-25 | End: 2017-11-07

## 2017-09-27 RX ORDER — DEXAMETHASONE 4 MG/1
TABLET ORAL
Qty: 12 G | Refills: 0 | Status: SHIPPED | OUTPATIENT
Start: 2017-09-27 | End: 2017-10-03 | Stop reason: ALTCHOICE

## 2017-09-27 RX ORDER — OMEPRAZOLE 20 MG/1
CAPSULE, DELAYED RELEASE ORAL
Qty: 30 CAPSULE | Refills: 2 | Status: SHIPPED | OUTPATIENT
Start: 2017-09-27 | End: 2018-01-09

## 2017-10-03 ENCOUNTER — OFFICE VISIT (OUTPATIENT)
Dept: FAMILY MEDICINE CLINIC | Facility: CLINIC | Age: 52
End: 2017-10-03

## 2017-10-03 VITALS
DIASTOLIC BLOOD PRESSURE: 88 MMHG | HEIGHT: 64 IN | WEIGHT: 156 LBS | HEART RATE: 104 BPM | BODY MASS INDEX: 26.63 KG/M2 | TEMPERATURE: 99 F | SYSTOLIC BLOOD PRESSURE: 120 MMHG

## 2017-10-03 DIAGNOSIS — Z23 NEED FOR VACCINATION: ICD-10-CM

## 2017-10-03 DIAGNOSIS — F43.0 PANIC ATTACK AS REACTION TO STRESS: ICD-10-CM

## 2017-10-03 DIAGNOSIS — F41.1 GENERALIZED ANXIETY DISORDER: ICD-10-CM

## 2017-10-03 DIAGNOSIS — F10.20 ALCOHOLIC (HCC): ICD-10-CM

## 2017-10-03 DIAGNOSIS — F41.0 PANIC ATTACK AS REACTION TO STRESS: ICD-10-CM

## 2017-10-03 DIAGNOSIS — I10 ESSENTIAL HYPERTENSION: Primary | ICD-10-CM

## 2017-10-03 PROCEDURE — 90471 IMMUNIZATION ADMIN: CPT | Performed by: FAMILY MEDICINE

## 2017-10-03 PROCEDURE — 99214 OFFICE O/P EST MOD 30 MIN: CPT | Performed by: FAMILY MEDICINE

## 2017-10-03 PROCEDURE — 90686 IIV4 VACC NO PRSV 0.5 ML IM: CPT | Performed by: FAMILY MEDICINE

## 2017-10-03 RX ORDER — PREDNISONE 20 MG/1
TABLET ORAL
Refills: 0 | COMMUNITY
Start: 2017-09-07 | End: 2017-10-03 | Stop reason: ALTCHOICE

## 2017-10-03 RX ORDER — CLARITHROMYCIN 500 MG/1
TABLET, COATED ORAL
Refills: 0 | COMMUNITY
Start: 2017-09-07 | End: 2017-10-03 | Stop reason: ALTCHOICE

## 2017-10-03 NOTE — PROGRESS NOTES
Masoud Estrada is a 46year old female. HPI:   Patient is in for follow-up on her generalized anxiety disorder and panic attacks. .  Patient states it got progressively worse in the last couple of weeks in which she developed worsening panic attacks, e Tab TAKE ONE-HALF TO ONE TABLET BY MOUTH EVERY NIGHT AS NEEDED FOR SLEEP Disp: 30 tablet Rfl: 0   ALPRAZolam 0.5 MG Oral Tab Take 0.5-1 tablets (0.25-0.5 mg total) by mouth 2 (two) times daily as needed for Anxiety.  Disp: 45 tablet Rfl: 0   escitalopram 20 Location: Left arm, Patient Position: Sitting, Cuff Size: adult)   Pulse 104   Temp 98.7 °F (37.1 °C) (Oral)   Ht 64\"   Wt 156 lb   BMI 26.78 kg/m²   GENERAL: well developed, well nourished,in no apparent distress  SKIN: no rashes,no suspicious lesions  E counseling. Continue with daily Lexapro and trazodone at night  Reviewed medication benefits and side effects. Discussed if any problems call office immediately especially if gets increased depression, anxiety or any homicidal or suicidal symptoms.    Ti

## 2017-10-04 ENCOUNTER — TELEPHONE (OUTPATIENT)
Dept: FAMILY MEDICINE CLINIC | Facility: CLINIC | Age: 52
End: 2017-10-04

## 2017-10-04 NOTE — TELEPHONE ENCOUNTER
The patient's completed \"Certification of Health Care Provider for Employee's Serious Health Condition\" form was successfully faxed to the attention of Human Resources at [phone].       The patient's completed \"Short Term Disability Claim Packet\"
no clubbing/no pedal edema/no cyanosis

## 2017-10-17 NOTE — TELEPHONE ENCOUNTER
From: Madelyn Steele  Sent: 10/17/2017 9:31 AM CDT  Subject: Medication Renewal Request    Madelyn Steele would like a refill of the following medications:     ALPRAZolam 0.5 MG Oral Tab Yahir Jonas PA-C]    Preferred pharmacy: [facility]

## 2017-10-18 RX ORDER — ALPRAZOLAM 0.5 MG/1
TABLET ORAL 2 TIMES DAILY PRN
Qty: 45 TABLET | Refills: 0
Start: 2017-10-18 | End: 2017-11-21

## 2017-10-18 NOTE — TELEPHONE ENCOUNTER
Rx has been phoned in to pharmacy qty 1200 Donny Fried left on pharmacy VM  Future Appointments  Date Time Provider Sasha Chen   10/20/2017 10:30 AM Dora Cantu PA-C [facility] [place]

## 2017-10-18 NOTE — TELEPHONE ENCOUNTER
Pt got #45 on 9/14/17.   Has an appt with Jane Moore on 10/20    Christi Friday to wait till her appt to discuss further

## 2017-10-20 ENCOUNTER — OFFICE VISIT (OUTPATIENT)
Dept: FAMILY MEDICINE CLINIC | Facility: CLINIC | Age: 52
End: 2017-10-20

## 2017-10-20 VITALS
WEIGHT: 159 LBS | HEIGHT: 64 IN | BODY MASS INDEX: 27.14 KG/M2 | DIASTOLIC BLOOD PRESSURE: 80 MMHG | HEART RATE: 112 BPM | SYSTOLIC BLOOD PRESSURE: 114 MMHG

## 2017-10-20 DIAGNOSIS — F41.1 ANXIETY IN ACUTE STRESS REACTION: ICD-10-CM

## 2017-10-20 DIAGNOSIS — F10.21 ALCOHOL DEPENDENCE IN EARLY FULL REMISSION (HCC): ICD-10-CM

## 2017-10-20 DIAGNOSIS — I10 ESSENTIAL HYPERTENSION: Primary | ICD-10-CM

## 2017-10-20 DIAGNOSIS — F43.0 ANXIETY IN ACUTE STRESS REACTION: ICD-10-CM

## 2017-10-20 DIAGNOSIS — F51.05 INSOMNIA SECONDARY TO ANXIETY: ICD-10-CM

## 2017-10-20 DIAGNOSIS — F41.9 INSOMNIA SECONDARY TO ANXIETY: ICD-10-CM

## 2017-10-20 DIAGNOSIS — F41.1 GENERALIZED ANXIETY DISORDER: ICD-10-CM

## 2017-10-20 PROCEDURE — 99214 OFFICE O/P EST MOD 30 MIN: CPT | Performed by: FAMILY MEDICINE

## 2017-10-20 NOTE — PROGRESS NOTES
Serena Russo is a 46year old female. HPI:   Patient is in for follow-up on generalized anxiety and severe anxiety under situational changes.   Patient had been under some severe financial troubles and had to sell her house or she would of gone into Prescriptions:  ALPRAZolam 0.5 MG Oral Tab Take 0.5-1 tablets (0.25-0.5 mg total) by mouth 2 (two) times daily as needed for Anxiety.  Disp: 45 tablet Rfl: 0   OMEPRAZOLE 20 MG Oral Capsule Delayed Release TAKE 1 CAPSULE BY MOUTH EVERY MORNING Disp: 30 caps denies abdominal pain and denies heartburn  NEURO: denies headaches  Musculoskeletal: No motor deficits  Psych see above  EXAM:   /80 (BP Location: Right arm, Patient Position: Sitting, Cuff Size: adult)   Pulse 112   Ht 64\"   Wt 159 lb   BMI 27.29 conservative use decrease usage over the next 2 weeks before going back to work secondary to inability to drive with medication. Counseling advised patient is unable to afford. Continue with support from family and friends.     3. Insomnia secondary to an

## 2017-10-30 ENCOUNTER — TELEPHONE (OUTPATIENT)
Dept: FAMILY MEDICINE CLINIC | Facility: CLINIC | Age: 52
End: 2017-10-30

## 2017-10-30 NOTE — TELEPHONE ENCOUNTER
Carlitos Hayes just received a call from her job,they have not received the Disability papework that was to be faxed this morning, she is not getting any money until they receive the paperwork, she seen Beverly Yoo on Friday to finish the paperwork, please call Lorna

## 2017-11-02 RX ORDER — LOSARTAN POTASSIUM 50 MG/1
TABLET ORAL
Qty: 90 TABLET | Refills: 0 | Status: SHIPPED | OUTPATIENT
Start: 2017-11-02 | End: 2017-11-17 | Stop reason: ALTCHOICE

## 2017-11-07 RX ORDER — TRAZODONE HYDROCHLORIDE 150 MG/1
TABLET ORAL
Qty: 30 TABLET | Refills: 0 | Status: SHIPPED | OUTPATIENT
Start: 2017-11-07 | End: 2018-03-12

## 2017-11-17 RX ORDER — LOSARTAN POTASSIUM AND HYDROCHLOROTHIAZIDE 12.5; 1 MG/1; MG/1
1 TABLET ORAL DAILY
Qty: 90 TABLET | Refills: 0 | Status: SHIPPED | OUTPATIENT
Start: 2017-11-17 | End: 2018-02-13

## 2017-11-17 RX ORDER — ESCITALOPRAM OXALATE 20 MG/1
TABLET ORAL
Qty: 90 TABLET | Refills: 0 | Status: SHIPPED | OUTPATIENT
Start: 2017-11-17 | End: 2018-02-13

## 2017-11-21 NOTE — PROGRESS NOTES
Harvie Dancer is a 46year old female. HPI:     #1 Patient is in for follow-up on short-term disability from work. Short-term disability started 9/25/17 due to anxiety reaction under severe stress, panic attack, and hypertensive reaction.   Patient Lexapro 20 mg and in order to sleep takes 150 mg of trazodone. Her blood pressure has finally come down secondary to the house selling and weight loss. Gets good support from her fiancé who does not drink and will not let her drink.       #2 follow-up UTI 11/14/2013   • Hiatal hernia 11/14/2013   • Infective otitis externa, unspecified 11/30/10   • Internal hemorrhoids 11/14/2013   • Nonspecific colitis 11/14/2013    mild, non-specific colitis, possibly prep induced   • Other disorder of menstruation and ot encounter diagnosis)  Fran (generalized anxiety disorder)  Insomnia secondary to anxiety  Glucosuria  History of alcohol dependence (hcc)  Acute cystitis without hematuria resolved      Orders Placed This Encounter      mmm HBA1C    Meds & Refills for this

## 2017-11-22 PROBLEM — F10.21 HISTORY OF ALCOHOL DEPENDENCE (HCC): Status: ACTIVE | Noted: 2017-11-22

## 2017-11-22 PROBLEM — F43.22 ACUTE ADJUSTMENT DISORDER WITH ANXIETY: Status: ACTIVE | Noted: 2017-11-22

## 2017-11-27 ENCOUNTER — TELEPHONE (OUTPATIENT)
Dept: FAMILY MEDICINE CLINIC | Facility: CLINIC | Age: 52
End: 2017-11-27

## 2017-11-28 NOTE — TELEPHONE ENCOUNTER
The patient's \"Short Term Disability Provider Update Questionnaire\" and Progress Notes from 10/20/2017 & 11/21/2017 were successfully faxed to [id number] [facility] at [phone].

## 2017-11-28 NOTE — TELEPHONE ENCOUNTER
Per Nury Downey PA-C, the patient was asked whether or not she made a therapy appointment yet.   The patient reported that she spoke with Nba Dhillon, through the [facility], yesterday regarding a counseling referral.  She stated

## 2017-12-01 ENCOUNTER — TELEPHONE (OUTPATIENT)
Dept: FAMILY MEDICINE CLINIC | Facility: CLINIC | Age: 52
End: 2017-12-01

## 2017-12-01 NOTE — TELEPHONE ENCOUNTER
Pt called in and said that her STD was approved to be extended by her company but they are now requesting on letterhead a note that Lavernmary Jaime has extended her STD and pt will be going back to work on 12/19/17 and that pt does have a follow up scheduled on 1

## 2017-12-03 NOTE — TELEPHONE ENCOUNTER
OK to write note for her to be off till her return to work on 12/19/17 she is to keep appointment for 12/12/17

## 2017-12-04 NOTE — TELEPHONE ENCOUNTER
Spoke to patient and explained letter is ready. She wants it faxed to [facility] [employer] at [phone]    Note faxed as requested.

## 2017-12-12 NOTE — PROGRESS NOTES
Jazmyne Romero is a 46year old female. HPI:   Patient states that she has been adjusting better with her life situation. Has moved in with her fiancé in the last 2 months. Is closing on her house for the sale in the next week or 2.   Has moved most ESCITALOPRAM 20 MG Oral Tab TAKE 1 TABLET(20 MG) BY MOUTH EVERY DAY Disp: 90 tablet Rfl: 0   TRAZODONE  MG Oral Tab TAKE ONE-HALF TO ONE TABLET BY MOUTH EVERY NIGHT AS NEEDED FOR SLEEP Disp: 30 tablet Rfl: 0   OMEPRAZOLE 20 MG Oral Capsule Delayed Patient Position: Sitting, Cuff Size: adult)   Pulse 120   Ht 64.25\"   Wt 159 lb   BMI 27.08 kg/m²   GENERAL: well developed, well nourished,in no apparent distress  SKIN: no rashes,no suspicious lesions  HEENT: TM clear bilaterally, nose no congestion, t ALPRAZolam 0.5 MG Oral Tab; Take 0.5-1 tablets (0.25-0.5 mg total) by mouth every morning. As needed  Dispense: 30 tablet; Refill: 0    3.  Smoker  Smoking cessation again discussed she states that her and her fiancé are going to start smoking cessation dur

## 2017-12-12 NOTE — PROGRESS NOTES
Per patient request, the patient's completed \"FMLA Return to Work Certification\" form was successfully faxed to the attention of EPIFANIO Chua at [phone].

## 2017-12-19 ENCOUNTER — TELEPHONE (OUTPATIENT)
Dept: FAMILY MEDICINE CLINIC | Facility: CLINIC | Age: 52
End: 2017-12-19

## 2018-01-09 RX ORDER — OMEPRAZOLE 20 MG/1
CAPSULE, DELAYED RELEASE ORAL
Qty: 30 CAPSULE | Refills: 2 | Status: SHIPPED | OUTPATIENT
Start: 2018-01-09 | End: 2018-04-10

## 2018-01-12 RX ORDER — TRAZODONE HYDROCHLORIDE 150 MG/1
TABLET ORAL
Qty: 30 TABLET | Refills: 0 | Status: SHIPPED | OUTPATIENT
Start: 2018-01-12 | End: 2018-02-09

## 2018-01-26 DIAGNOSIS — F43.22 ACUTE ADJUSTMENT DISORDER WITH ANXIETY: ICD-10-CM

## 2018-01-26 DIAGNOSIS — F41.1 GENERALIZED ANXIETY DISORDER: ICD-10-CM

## 2018-01-26 RX ORDER — ALPRAZOLAM 0.5 MG/1
TABLET ORAL EVERY MORNING
Qty: 30 TABLET | Refills: 0
Start: 2018-01-26 | End: 2018-02-26

## 2018-01-26 NOTE — TELEPHONE ENCOUNTER
From: Gianni Bernal  Sent: 1/26/2018 8:11 AM CST  Subject: Medication Renewal Request    Gianni Bernal would like a refill of the following medications:     ALPRAZolam 0.5 MG Oral Tab Kevin Oden PA-C]    Preferred pharmacy: [facility]

## 2018-02-12 RX ORDER — TRAZODONE HYDROCHLORIDE 150 MG/1
TABLET ORAL
Qty: 30 TABLET | Refills: 0 | Status: SHIPPED | OUTPATIENT
Start: 2018-02-12 | End: 2018-09-13 | Stop reason: ALTCHOICE

## 2018-02-13 RX ORDER — ESCITALOPRAM OXALATE 20 MG/1
TABLET ORAL
Qty: 90 TABLET | Refills: 0 | Status: SHIPPED | OUTPATIENT
Start: 2018-02-13 | End: 2018-04-26

## 2018-02-13 RX ORDER — LOSARTAN POTASSIUM AND HYDROCHLOROTHIAZIDE 12.5; 1 MG/1; MG/1
1 TABLET ORAL DAILY
Qty: 90 TABLET | Refills: 0 | Status: SHIPPED | OUTPATIENT
Start: 2018-02-13 | End: 2018-05-25

## 2018-02-26 DIAGNOSIS — F43.22 ACUTE ADJUSTMENT DISORDER WITH ANXIETY: ICD-10-CM

## 2018-02-26 DIAGNOSIS — F41.1 GENERALIZED ANXIETY DISORDER: ICD-10-CM

## 2018-02-26 RX ORDER — ALPRAZOLAM 0.5 MG/1
TABLET ORAL EVERY MORNING
Qty: 30 TABLET | Refills: 0
Start: 2018-02-26 | End: 2018-03-27

## 2018-02-26 NOTE — TELEPHONE ENCOUNTER
From: Raquel Child  Sent: 2/26/2018 9:56 AM CST  Subject: Medication Renewal Request    Raquel Child would like a refill of the following medications:     ALPRAZolam 0.5 MG Oral Tab Leeann Escobar PA-C]    Preferred pharmacy: [facility]

## 2018-03-12 RX ORDER — TRAZODONE HYDROCHLORIDE 150 MG/1
TABLET ORAL
Qty: 30 TABLET | Refills: 1 | Status: SHIPPED | OUTPATIENT
Start: 2018-03-12 | End: 2018-05-11

## 2018-03-27 DIAGNOSIS — F41.1 GENERALIZED ANXIETY DISORDER: ICD-10-CM

## 2018-03-27 DIAGNOSIS — F43.22 ACUTE ADJUSTMENT DISORDER WITH ANXIETY: ICD-10-CM

## 2018-03-27 RX ORDER — ALPRAZOLAM 0.5 MG/1
TABLET ORAL EVERY MORNING
Qty: 30 TABLET | Refills: 0 | OUTPATIENT
Start: 2018-03-27 | End: 2018-04-26

## 2018-03-27 NOTE — TELEPHONE ENCOUNTER
From: Thomas Bauer  Sent: 3/27/2018 8:52 AM CDT  Subject: Medication Renewal Request    Thomas Bauer would like a refill of the following medications:     ALPRAZolam 0.5 MG Oral Tab Arely Apple PA-C]    Preferred pharmacy: [facility]

## 2018-04-10 RX ORDER — OMEPRAZOLE 20 MG/1
CAPSULE, DELAYED RELEASE ORAL
Qty: 90 CAPSULE | Refills: 0 | Status: SHIPPED | OUTPATIENT
Start: 2018-04-10 | End: 2018-07-10

## 2018-04-26 DIAGNOSIS — F43.22 ACUTE ADJUSTMENT DISORDER WITH ANXIETY: ICD-10-CM

## 2018-04-26 DIAGNOSIS — F41.1 GENERALIZED ANXIETY DISORDER: ICD-10-CM

## 2018-04-26 RX ORDER — ALPRAZOLAM 0.5 MG/1
TABLET ORAL EVERY MORNING
Qty: 30 TABLET | Refills: 0
Start: 2018-04-26 | End: 2018-06-07

## 2018-04-26 RX ORDER — ESCITALOPRAM OXALATE 20 MG/1
20 TABLET ORAL DAILY
Qty: 30 TABLET | Refills: 0 | Status: SHIPPED
Start: 2018-04-26 | End: 2018-05-25

## 2018-04-26 NOTE — TELEPHONE ENCOUNTER
From: Mitzy Upton  Sent: 4/26/2018 7:33 AM CDT  Subject: Medication Renewal Request    Mitzy Upton would like a refill of the following medications:     ESCITALOPRAM 20 MG Oral Tab Keli Powers PA-C]     ALPRAZolam 0.5 MG Oral Tab [facility]

## 2018-05-11 RX ORDER — TRAZODONE HYDROCHLORIDE 150 MG/1
TABLET ORAL
Qty: 30 TABLET | Refills: 0 | Status: SHIPPED | OUTPATIENT
Start: 2018-05-11 | End: 2018-06-18

## 2018-05-25 RX ORDER — ESCITALOPRAM OXALATE 20 MG/1
TABLET ORAL
Qty: 30 TABLET | Refills: 0 | Status: SHIPPED | OUTPATIENT
Start: 2018-05-25 | End: 2018-06-18

## 2018-05-25 RX ORDER — LOSARTAN POTASSIUM AND HYDROCHLOROTHIAZIDE 12.5; 1 MG/1; MG/1
1 TABLET ORAL DAILY
Qty: 30 TABLET | Refills: 0 | Status: ON HOLD | OUTPATIENT
Start: 2018-05-25 | End: 2018-09-15

## 2018-06-07 DIAGNOSIS — F43.22 ACUTE ADJUSTMENT DISORDER WITH ANXIETY: ICD-10-CM

## 2018-06-07 DIAGNOSIS — F41.1 GENERALIZED ANXIETY DISORDER: ICD-10-CM

## 2018-06-07 RX ORDER — ALPRAZOLAM 0.5 MG/1
TABLET ORAL EVERY MORNING
Qty: 30 TABLET | Refills: 0 | Status: CANCELLED
Start: 2018-06-07

## 2018-06-08 RX ORDER — ALPRAZOLAM 0.25 MG/1
TABLET ORAL
Qty: 25 TABLET | Refills: 0 | Status: SHIPPED | OUTPATIENT
Start: 2018-06-08 | End: 2018-08-14

## 2018-06-11 RX ORDER — TRAZODONE HYDROCHLORIDE 150 MG/1
TABLET ORAL
Qty: 30 TABLET | Refills: 0 | OUTPATIENT
Start: 2018-06-11

## 2018-06-15 RX ORDER — ESCITALOPRAM OXALATE 20 MG/1
TABLET ORAL
Qty: 30 TABLET | Refills: 0
Start: 2018-06-15

## 2018-06-15 RX ORDER — TRAZODONE HYDROCHLORIDE 150 MG/1
TABLET ORAL
Qty: 30 TABLET | Refills: 0
Start: 2018-06-15

## 2018-06-18 RX ORDER — ESCITALOPRAM OXALATE 20 MG/1
20 TABLET ORAL DAILY
Qty: 15 TABLET | Refills: 0 | Status: SHIPPED | OUTPATIENT
Start: 2018-06-18 | End: 2018-10-07

## 2018-06-18 RX ORDER — TRAZODONE HYDROCHLORIDE 150 MG/1
150 TABLET ORAL NIGHTLY
Qty: 15 TABLET | Refills: 0 | Status: SHIPPED | OUTPATIENT
Start: 2018-06-18 | End: 2018-09-13 | Stop reason: ALTCHOICE

## 2018-06-18 NOTE — TELEPHONE ENCOUNTER
From: Gloria Palmer  Sent: 6/18/2018 9:14 AM CDT  Subject: Medication Renewal Request    Gloria Palmer would like a refill of the following medications:     TRAZODONE  MG Oral Tab Marcy Willis PA-C]     ESCITALOPRAM 20 MG Oral Tab [

## 2018-07-02 RX ORDER — TRAZODONE HYDROCHLORIDE 150 MG/1
TABLET ORAL
Qty: 15 TABLET | Refills: 0 | OUTPATIENT
Start: 2018-07-02

## 2018-07-02 RX ORDER — ESCITALOPRAM OXALATE 20 MG/1
TABLET ORAL
Qty: 15 TABLET | Refills: 0 | OUTPATIENT
Start: 2018-07-02

## 2018-07-10 RX ORDER — OMEPRAZOLE 20 MG/1
CAPSULE, DELAYED RELEASE ORAL
Qty: 30 CAPSULE | Refills: 0 | Status: SHIPPED | OUTPATIENT
Start: 2018-07-10 | End: 2018-09-13 | Stop reason: ALTCHOICE

## 2018-09-03 RX ORDER — OMEPRAZOLE 20 MG/1
20 CAPSULE, DELAYED RELEASE ORAL
Qty: 30 CAPSULE | Refills: 0
Start: 2018-09-03

## 2018-09-10 NOTE — TELEPHONE ENCOUNTER
Heraclio Marroquin is coming in on Thursday to do bloodwork, she would like new orders put in the system so she can have her previous orders done, she is also looking to have her A1C done too. Please update orders so she can have everything done on Thursday.

## 2018-09-13 ENCOUNTER — OFFICE VISIT (OUTPATIENT)
Dept: FAMILY MEDICINE CLINIC | Facility: CLINIC | Age: 53
End: 2018-09-13
Payer: COMMERCIAL

## 2018-09-13 ENCOUNTER — HOSPITAL ENCOUNTER (INPATIENT)
Facility: HOSPITAL | Age: 53
LOS: 2 days | Discharge: HOME | DRG: 684 | End: 2018-09-15
Attending: EMERGENCY MEDICINE | Admitting: HOSPITALIST
Payer: COMMERCIAL

## 2018-09-13 ENCOUNTER — APPOINTMENT (OUTPATIENT)
Dept: GENERAL RADIOLOGY | Age: 53
DRG: 684 | End: 2018-09-13
Attending: EMERGENCY MEDICINE
Payer: COMMERCIAL

## 2018-09-13 ENCOUNTER — LABORATORY ENCOUNTER (OUTPATIENT)
Dept: LAB | Age: 53
End: 2018-09-13
Attending: FAMILY MEDICINE

## 2018-09-13 VITALS
BODY MASS INDEX: 24.75 KG/M2 | TEMPERATURE: 98 F | WEIGHT: 145 LBS | HEART RATE: 72 BPM | SYSTOLIC BLOOD PRESSURE: 90 MMHG | DIASTOLIC BLOOD PRESSURE: 60 MMHG | HEIGHT: 64.13 IN

## 2018-09-13 DIAGNOSIS — I10 ESSENTIAL HYPERTENSION: ICD-10-CM

## 2018-09-13 DIAGNOSIS — N17.9 ACUTE RENAL FAILURE, UNSPECIFIED ACUTE RENAL FAILURE TYPE (HCC): Primary | ICD-10-CM

## 2018-09-13 DIAGNOSIS — R73.01 IMPAIRED FASTING GLUCOSE: ICD-10-CM

## 2018-09-13 DIAGNOSIS — R17 SCLERAL ICTERUS: ICD-10-CM

## 2018-09-13 DIAGNOSIS — I95.1 ORTHOSTATIC HYPOTENSION: Primary | ICD-10-CM

## 2018-09-13 DIAGNOSIS — F10.20 ALCOHOLIC (HCC): ICD-10-CM

## 2018-09-13 DIAGNOSIS — K21.9 GASTROESOPHAGEAL REFLUX DISEASE WITHOUT ESOPHAGITIS: ICD-10-CM

## 2018-09-13 DIAGNOSIS — F41.1 GENERALIZED ANXIETY DISORDER: ICD-10-CM

## 2018-09-13 DIAGNOSIS — Z23 NEED FOR VACCINATION: ICD-10-CM

## 2018-09-13 DIAGNOSIS — R81 GLUCOSURIA: ICD-10-CM

## 2018-09-13 DIAGNOSIS — K44.9 HIATAL HERNIA: ICD-10-CM

## 2018-09-13 DIAGNOSIS — R74.8 ELEVATED LIVER ENZYMES: ICD-10-CM

## 2018-09-13 DIAGNOSIS — E87.6 HYPOKALEMIA: ICD-10-CM

## 2018-09-13 DIAGNOSIS — F10.10 ALCOHOL ABUSE: ICD-10-CM

## 2018-09-13 LAB
ALBUMIN SERPL-MCNC: 2.8 G/DL (ref 3.5–4.8)
ALBUMIN/GLOB SERPL: 0.8 {RATIO} (ref 1–2)
ALP LIVER SERPL-CCNC: 293 U/L (ref 41–108)
ALT SERPL-CCNC: 95 U/L (ref 14–54)
AMPHET UR QL SCN: NEGATIVE
ANION GAP SERPL CALC-SCNC: 10 MMOL/L (ref 0–18)
APTT PPP: 33.1 SECONDS (ref 26.1–34.6)
AST SERPL-CCNC: 143 U/L (ref 15–41)
BARBITURATES UR QL SCN: NEGATIVE
BASOPHILS # BLD AUTO: 0.06 X10(3) UL (ref 0–0.1)
BASOPHILS NFR BLD AUTO: 1.1 %
BILIRUB SERPL-MCNC: 3.9 MG/DL (ref 0.1–2)
BUN BLD-MCNC: 21 MG/DL (ref 8–20)
BUN/CREAT SERPL: 8.7 (ref 10–20)
CALCIUM BLD-MCNC: 8.6 MG/DL (ref 8.3–10.3)
CANNABINOIDS UR QL SCN: NEGATIVE
CHLORIDE SERPL-SCNC: 99 MMOL/L (ref 101–111)
CHOLEST SMN-MCNC: 195 MG/DL (ref ?–200)
CO2 SERPL-SCNC: 29 MMOL/L (ref 22–32)
COCAINE UR QL: NEGATIVE
CREAT BLD-MCNC: 2.41 MG/DL (ref 0.55–1.02)
CRP SERPL-MCNC: 1.34 MG/DL (ref ?–1)
EOSINOPHIL # BLD AUTO: 0.03 X10(3) UL (ref 0–0.3)
EOSINOPHIL NFR BLD AUTO: 0.5 %
ERYTHROCYTE [DISTWIDTH] IN BLOOD BY AUTOMATED COUNT: 13 % (ref 11.5–16)
EST. AVERAGE GLUCOSE BLD GHB EST-MCNC: 80 MG/DL (ref 68–126)
ETHANOL UR-MCNC: NEGATIVE MG/DL
GGT SERPL-CCNC: 1285 U/L (ref 5–55)
GLOBULIN PLAS-MCNC: 3.3 G/DL (ref 2.5–4)
GLUCOSE BLD-MCNC: 144 MG/DL (ref 70–99)
HAV AB SERPL IA-ACNC: >2000 PG/ML (ref 193–986)
HBA1C MFR BLD HPLC: 4.4 % (ref ?–5.7)
HCT VFR BLD AUTO: 36.5 % (ref 34–50)
HDLC SERPL-MCNC: 22 MG/DL (ref 40–59)
HGB BLD-MCNC: 12.9 G/DL (ref 12–16)
IMMATURE GRANULOCYTE COUNT: 0.02 X10(3) UL (ref 0–1)
IMMATURE GRANULOCYTE RATIO %: 0.4 %
INR BLD: 1.1 (ref 0.9–1.1)
LDLC SERPL CALC-MCNC: 140 MG/DL (ref ?–100)
LYMPHOCYTES # BLD AUTO: 1.11 X10(3) UL (ref 0.9–4)
LYMPHOCYTES NFR BLD AUTO: 19.5 %
M PROTEIN MFR SERPL ELPH: 6.1 G/DL (ref 6.1–8.3)
MCH RBC QN AUTO: 41.1 PG (ref 27–33.2)
MCHC RBC AUTO-ENTMCNC: 35.3 G/DL (ref 31–37)
MCV RBC AUTO: 116.2 FL (ref 81–100)
MONOCYTES # BLD AUTO: 0.35 X10(3) UL (ref 0.1–1)
MONOCYTES NFR BLD AUTO: 6.2 %
NEUTROPHIL ABS PRELIM: 4.12 X10 (3) UL (ref 1.3–6.7)
NEUTROPHILS # BLD AUTO: 4.12 X10(3) UL (ref 1.3–6.7)
NEUTROPHILS NFR BLD AUTO: 72.3 %
NONHDLC SERPL-MCNC: 173 MG/DL (ref ?–130)
OPIATE URINE: NEGATIVE
OSMOLALITY SERPL CALC.SUM OF ELEC: 292 MOSM/KG (ref 275–295)
PCP URINE: NEGATIVE
PLATELET # BLD AUTO: 295 10(3)UL (ref 150–450)
POTASSIUM SERPL-SCNC: 3 MMOL/L (ref 3.6–5.1)
PSA SERPL DL<=0.01 NG/ML-MCNC: 14.7 SECONDS (ref 12.4–14.7)
RBC # BLD AUTO: 3.14 X10(6)UL (ref 3.8–5.1)
RED CELL DISTRIBUTION WIDTH-SD: 55.7 FL (ref 35.1–46.3)
SODIUM SERPL-SCNC: 138 MMOL/L (ref 136–144)
T4 FREE SERPL-MCNC: 1.4 NG/DL (ref 0.9–1.8)
TRIGL SERPL-MCNC: 163 MG/DL (ref 30–149)
TSI SER-ACNC: 2.95 MIU/ML (ref 0.35–5.5)
VLDLC SERPL CALC-MCNC: 33 MG/DL (ref 0–30)
WBC # BLD AUTO: 5.7 X10(3) UL (ref 4–13)

## 2018-09-13 PROCEDURE — 99220 INITIAL OBSERVATION CARE,LEVL III: CPT | Performed by: INTERNAL MEDICINE

## 2018-09-13 PROCEDURE — 86140 C-REACTIVE PROTEIN: CPT | Performed by: FAMILY MEDICINE

## 2018-09-13 PROCEDURE — 36415 COLL VENOUS BLD VENIPUNCTURE: CPT | Performed by: FAMILY MEDICINE

## 2018-09-13 PROCEDURE — 82977 ASSAY OF GGT: CPT | Performed by: FAMILY MEDICINE

## 2018-09-13 PROCEDURE — 83036 HEMOGLOBIN GLYCOSYLATED A1C: CPT | Performed by: FAMILY MEDICINE

## 2018-09-13 PROCEDURE — 90471 IMMUNIZATION ADMIN: CPT | Performed by: FAMILY MEDICINE

## 2018-09-13 PROCEDURE — 80050 GENERAL HEALTH PANEL: CPT | Performed by: FAMILY MEDICINE

## 2018-09-13 PROCEDURE — 80061 LIPID PANEL: CPT | Performed by: FAMILY MEDICINE

## 2018-09-13 PROCEDURE — 99214 OFFICE O/P EST MOD 30 MIN: CPT | Performed by: FAMILY MEDICINE

## 2018-09-13 PROCEDURE — 90686 IIV4 VACC NO PRSV 0.5 ML IM: CPT | Performed by: FAMILY MEDICINE

## 2018-09-13 PROCEDURE — 84439 ASSAY OF FREE THYROXINE: CPT | Performed by: FAMILY MEDICINE

## 2018-09-13 PROCEDURE — 82607 VITAMIN B-12: CPT | Performed by: FAMILY MEDICINE

## 2018-09-13 PROCEDURE — 71045 X-RAY EXAM CHEST 1 VIEW: CPT | Performed by: EMERGENCY MEDICINE

## 2018-09-13 RX ORDER — PANTOPRAZOLE SODIUM 20 MG/1
20 TABLET, DELAYED RELEASE ORAL
Status: DISCONTINUED | OUTPATIENT
Start: 2018-09-13 | End: 2018-09-15

## 2018-09-13 RX ORDER — POTASSIUM CHLORIDE 20 MEQ/1
40 TABLET, EXTENDED RELEASE ORAL ONCE
Status: COMPLETED | OUTPATIENT
Start: 2018-09-13 | End: 2018-09-13

## 2018-09-13 RX ORDER — GARLIC EXTRACT 500 MG
1 CAPSULE ORAL DAILY
Status: DISCONTINUED | OUTPATIENT
Start: 2018-09-14 | End: 2018-09-15

## 2018-09-13 RX ORDER — OMEPRAZOLE 20 MG/1
20 TABLET, DELAYED RELEASE ORAL DAILY
COMMUNITY
End: 2019-08-15

## 2018-09-13 RX ORDER — LORAZEPAM 2 MG/ML
2 INJECTION INTRAMUSCULAR
Status: DISCONTINUED | OUTPATIENT
Start: 2018-09-13 | End: 2018-09-14

## 2018-09-13 RX ORDER — METOCLOPRAMIDE HYDROCHLORIDE 5 MG/ML
5 INJECTION INTRAMUSCULAR; INTRAVENOUS EVERY 8 HOURS PRN
Status: DISCONTINUED | OUTPATIENT
Start: 2018-09-13 | End: 2018-09-15

## 2018-09-13 RX ORDER — FOLIC ACID 1 MG/1
1 TABLET ORAL DAILY
Status: DISCONTINUED | OUTPATIENT
Start: 2018-09-13 | End: 2018-09-15

## 2018-09-13 RX ORDER — ESCITALOPRAM OXALATE 20 MG/1
20 TABLET ORAL DAILY
Status: DISCONTINUED | OUTPATIENT
Start: 2018-09-14 | End: 2018-09-15

## 2018-09-13 RX ORDER — SODIUM CHLORIDE 9 MG/ML
INJECTION, SOLUTION INTRAVENOUS CONTINUOUS
Status: DISCONTINUED | OUTPATIENT
Start: 2018-09-13 | End: 2018-09-15

## 2018-09-13 RX ORDER — LORAZEPAM 2 MG/ML
1 INJECTION INTRAMUSCULAR
Status: DISCONTINUED | OUTPATIENT
Start: 2018-09-13 | End: 2018-09-14

## 2018-09-13 RX ORDER — TRAZODONE HYDROCHLORIDE 100 MG/1
50 TABLET ORAL NIGHTLY PRN
Status: DISCONTINUED | OUTPATIENT
Start: 2018-09-13 | End: 2018-09-15

## 2018-09-13 RX ORDER — LORAZEPAM 2 MG/ML
0.5 INJECTION INTRAMUSCULAR ONCE
Status: COMPLETED | OUTPATIENT
Start: 2018-09-13 | End: 2018-09-13

## 2018-09-13 RX ORDER — SODIUM CHLORIDE 9 MG/ML
INJECTION, SOLUTION INTRAVENOUS CONTINUOUS
Status: CANCELLED | OUTPATIENT
Start: 2018-09-13 | End: 2018-09-13

## 2018-09-13 RX ORDER — PANTOPRAZOLE SODIUM 20 MG/1
20 TABLET, DELAYED RELEASE ORAL
Status: DISCONTINUED | OUTPATIENT
Start: 2018-09-14 | End: 2018-09-13

## 2018-09-13 RX ORDER — ONDANSETRON 2 MG/ML
4 INJECTION INTRAMUSCULAR; INTRAVENOUS EVERY 6 HOURS PRN
Status: DISCONTINUED | OUTPATIENT
Start: 2018-09-13 | End: 2018-09-15

## 2018-09-13 RX ORDER — MELATONIN
100 DAILY
Status: DISCONTINUED | OUTPATIENT
Start: 2018-09-13 | End: 2018-09-15

## 2018-09-13 RX ORDER — LORAZEPAM 1 MG/1
2 TABLET ORAL
Status: DISCONTINUED | OUTPATIENT
Start: 2018-09-13 | End: 2018-09-14

## 2018-09-13 RX ORDER — LORAZEPAM 1 MG/1
1 TABLET ORAL
Status: DISCONTINUED | OUTPATIENT
Start: 2018-09-13 | End: 2018-09-14

## 2018-09-13 RX ORDER — ALPRAZOLAM 0.25 MG/1
TABLET ORAL
Qty: 20 TABLET | Refills: 1 | Status: SHIPPED | OUTPATIENT
Start: 2018-09-13 | End: 2018-11-08

## 2018-09-13 RX ORDER — MELATONIN
9 NIGHTLY
Status: DISCONTINUED | OUTPATIENT
Start: 2018-09-13 | End: 2018-09-15

## 2018-09-13 NOTE — PROGRESS NOTES
Also HDL extremely low secondary to no activity over the last several months putting you at risk for heart disease. Exercise needs to be started but slowly once the kidney function is balanced. Repeat CBC, CMP and lipids in 1 month.   Order future tests/m

## 2018-09-13 NOTE — PROGRESS NOTES
Valentina Fish is a 46year old female. HPI:   Patient is in the office with a complaint of dizziness that has been going on for several weeks.   States that she is unable to walk after she gets up feels lightheaded and feels like she is going to fa mmol/L    Potassium 3.0 (L) 3.6 - 5.1 mmol/L    Chloride 99 (L) 101 - 111 mmol/L    CO2 29.0 22.0 - 32.0 mmol/L    Anion Gap 10 0 - 18 mmol/L    BUN 21 (H) 8 - 20 mg/dL    Creatinine 2.41 (H) 0.55 - 1.02 mg/dL    BUN/CREA Ratio 8.7 (L) 10.0 - 20.0    Calci ALPRAZolam 0.25 MG Oral Tab Take 1/2 tablet once a day and taper down as tolerated Disp: 20 tablet Rfl: 1   escitalopram 20 MG Oral Tab Take 1 tablet (20 mg total) by mouth daily.  Disp: 15 tablet Rfl: 0   LOSARTAN POTASSIUM-HCTZ 100-12.5 MG Oral Tab TAKE Gastro-Intestinal Disorder Mother         Crohns   • Cancer Maternal Grandmother         ovarian cancer   • Gastro-Intestinal Disorder Sister         diverticulitis   • Cancer Sister         cervical cancer   • Colon Cancer Maternal Grandfather      REVIEW without murmur normal S1 S2   VASCULAR: 2+ dorsalis pedis pulses bilateral no varicosities, no edema    GI: normal bowel sounds, no masses, no pulsatile mass, no HSM or tenderness   EXTREMITIES: no cyanosis or clubbing leg muscles atrophied bilateral  NEUR nonsteroidals  GERD prevention reviewed avoid caffeine, alcohol, and nonstnoeroidals. Eat small frequent meals and avoid eating 3-4 hours before bedtime, try to raise the head of bed.    - Omeprazole Magnesium (PRILOSEC OTC) 20 MG Oral Tab EC;  Take 20 mg

## 2018-09-13 NOTE — PROGRESS NOTES
Unable to reach patient did leave voice message on her private cell phone, at 4:35 PM on 9/13/18, that she does need IV fluids and possibly hospitalization due to  acute renal failure.   Advise that she gets admitted to balance her kidney function and elect

## 2018-09-13 NOTE — ED PROVIDER NOTES
Patient Seen in: THE [facility] In [place]    History   Patient presents with:  Abnormal Result (metabolic, cardiac)  Dehydration (metabolic/constitutional)    Stated Complaint: Dehydration, Abnormal labs    HPI    51-year-old female come Comment:  Dr. Lula Dupont; Due 11/14/2018  24 years ago: OTHER      Comment:  trauma to right leg  11/14/2013: UPPER GI ENDOSCOPY,BIOPSY      Comment:  Dr. Lula Dupont; Due 5/14/2014        Social History    Tobacco Use      Smoking status: Current Ever Potassium Chloride ER (K-DUR M20) CR tab 40 mEq (40 mEq Oral Given 9/13/18 1749)   LORazepam (ATIVAN) injection 0.5 mg (0.5 mg Intravenous Given 9/13/18 1813)          Patient was observed in the department with IV fluid hydration as well as potassium re

## 2018-09-13 NOTE — PATIENT INSTRUCTIONS
Take 1/2 of the Losartan and take BP at home daily for 2 weeks goal 120/70     OK to have some NaCl until the BP goes up     Continue to try to taper off then xanax take 1/2 in the AM for 2 weeks then try 1/4 if tolerating the decrease.           Low Blood care  Follow up with your healthcare provider, or as advised.   When to seek medical advice  Call your healthcare provider right away if any of these occur:  · Dizziness, lightheadedness, or fainting  · Black or red color in your stools or vomit  · Shortnes depend on what is causing your low blood pressure. Home care  Follow these guidelines when caring for yourself at home:  · Rest until your symptoms get better.   · Change positions slowly from lying to standing. When getting out of bed, sit on the side of

## 2018-09-14 ENCOUNTER — APPOINTMENT (OUTPATIENT)
Dept: ULTRASOUND IMAGING | Facility: HOSPITAL | Age: 53
DRG: 684 | End: 2018-09-14
Attending: HOSPITALIST
Payer: COMMERCIAL

## 2018-09-14 LAB
ALBUMIN SERPL-MCNC: 2 G/DL (ref 3.5–4.8)
ALBUMIN/GLOB SERPL: 0.8 {RATIO} (ref 1–2)
ALP LIVER SERPL-CCNC: 210 U/L (ref 41–108)
ALT SERPL-CCNC: 66 U/L (ref 14–54)
ANION GAP SERPL CALC-SCNC: 9 MMOL/L (ref 0–18)
AST SERPL-CCNC: 99 U/L (ref 15–41)
ATRIAL RATE: 77 BPM
BASOPHILS # BLD AUTO: 0.05 X10(3) UL (ref 0–0.1)
BASOPHILS NFR BLD AUTO: 1.3 %
BILIRUB SERPL-MCNC: 3.1 MG/DL (ref 0.1–2)
BUN BLD-MCNC: 21 MG/DL (ref 8–20)
BUN/CREAT SERPL: 13.3 (ref 10–20)
CALCIUM BLD-MCNC: 7.2 MG/DL (ref 8.3–10.3)
CHLORIDE SERPL-SCNC: 106 MMOL/L (ref 101–111)
CO2 SERPL-SCNC: 25 MMOL/L (ref 22–32)
CREAT BLD-MCNC: 1.58 MG/DL (ref 0.55–1.02)
EOSINOPHIL # BLD AUTO: 0.08 X10(3) UL (ref 0–0.3)
EOSINOPHIL NFR BLD AUTO: 2 %
ERYTHROCYTE [DISTWIDTH] IN BLOOD BY AUTOMATED COUNT: 12.8 % (ref 11.5–16)
FOLATE SERPL-MCNC: 18.7 NG/ML (ref 5.9–?)
GLOBULIN PLAS-MCNC: 2.5 G/DL (ref 2.5–4)
GLUCOSE BLD-MCNC: 85 MG/DL (ref 70–99)
HAV IGM SER QL: 1.6 MG/DL (ref 1.8–2.5)
HAV IGM SER QL: NONREACTIVE
HBV CORE IGM SER QL: NONREACTIVE
HBV SURFACE AG SERPL QL IA: NONREACTIVE
HCG URINE QUALITATIVE: NEGATIVE
HCT VFR BLD AUTO: 28.1 % (ref 34–50)
HCV AB SERPL QL IA: NONREACTIVE
HGB BLD-MCNC: 10 G/DL (ref 12–16)
IMMATURE GRANULOCYTE COUNT: 0.02 X10(3) UL (ref 0–1)
IMMATURE GRANULOCYTE RATIO %: 0.5 %
LYMPHOCYTES # BLD AUTO: 0.87 X10(3) UL (ref 0.9–4)
LYMPHOCYTES NFR BLD AUTO: 22 %
M PROTEIN MFR SERPL ELPH: 4.5 G/DL (ref 6.1–8.3)
MCH RBC QN AUTO: 40.7 PG (ref 27–33.2)
MCHC RBC AUTO-ENTMCNC: 35.6 G/DL (ref 31–37)
MCV RBC AUTO: 114.2 FL (ref 81–100)
MONOCYTES # BLD AUTO: 0.32 X10(3) UL (ref 0.1–1)
MONOCYTES NFR BLD AUTO: 8.1 %
NEUTROPHIL ABS PRELIM: 2.61 X10 (3) UL (ref 1.3–6.7)
NEUTROPHILS # BLD AUTO: 2.61 X10(3) UL (ref 1.3–6.7)
NEUTROPHILS NFR BLD AUTO: 66.1 %
OSMOLALITY SERPL CALC.SUM OF ELEC: 292 MOSM/KG (ref 275–295)
P AXIS: 42 DEGREES
P-R INTERVAL: 152 MS
PHOSPHATE SERPL-MCNC: 2.7 MG/DL (ref 2.5–4.9)
PLATELET # BLD AUTO: 197 10(3)UL (ref 150–450)
POTASSIUM SERPL-SCNC: 2.9 MMOL/L (ref 3.6–5.1)
POTASSIUM SERPL-SCNC: 3.8 MMOL/L (ref 3.6–5.1)
Q-T INTERVAL: 426 MS
QRS DURATION: 66 MS
QTC CALCULATION (BEZET): 482 MS
R AXIS: 37 DEGREES
RBC # BLD AUTO: 2.46 X10(6)UL (ref 3.8–5.1)
RED CELL DISTRIBUTION WIDTH-SD: 53.9 FL (ref 35.1–46.3)
SODIUM SERPL-SCNC: 140 MMOL/L (ref 136–144)
T AXIS: 27 DEGREES
TSI SER-ACNC: 2.35 MIU/ML (ref 0.35–5.5)
VENTRICULAR RATE: 77 BPM
WBC # BLD AUTO: 4 X10(3) UL (ref 4–13)

## 2018-09-14 PROCEDURE — 99232 SBSQ HOSP IP/OBS MODERATE 35: CPT | Performed by: HOSPITALIST

## 2018-09-14 PROCEDURE — 76700 US EXAM ABDOM COMPLETE: CPT | Performed by: HOSPITALIST

## 2018-09-14 RX ORDER — POTASSIUM CHLORIDE 20 MEQ/1
40 TABLET, EXTENDED RELEASE ORAL EVERY 4 HOURS
Status: COMPLETED | OUTPATIENT
Start: 2018-09-14 | End: 2018-09-14

## 2018-09-14 RX ORDER — MAGNESIUM OXIDE 400 MG (241.3 MG MAGNESIUM) TABLET
400 TABLET ONCE
Status: COMPLETED | OUTPATIENT
Start: 2018-09-14 | End: 2018-09-14

## 2018-09-14 RX ORDER — NICOTINE 21 MG/24HR
1 PATCH, TRANSDERMAL 24 HOURS TRANSDERMAL DAILY
Status: DISCONTINUED | OUTPATIENT
Start: 2018-09-14 | End: 2018-09-15

## 2018-09-14 RX ORDER — ALPRAZOLAM 0.25 MG/1
0.25 TABLET ORAL DAILY PRN
Status: DISCONTINUED | OUTPATIENT
Start: 2018-09-14 | End: 2018-09-15

## 2018-09-14 NOTE — PLAN OF CARE
NURSING ADMISSION NOTE      Patient admitted via Wheelchair  Oriented to room. Safety precautions initiated. Bed in low position. Call light in reach. Admitted 2200. VSS. A/O x4. NSR on tele. Room air. WA protocol. IVF infusing.  Urine sample co

## 2018-09-14 NOTE — BH PROGRESS NOTE
Went to see the pt for f/u with her etoh history. Pt states she hasn't had a drink of etoh for 11 days. She said, she use to drink vodka and now she was drinking 2-3 glasses of wine. She stated, the wine was causing gastric problems so she stopped.   The

## 2018-09-14 NOTE — TELEPHONE ENCOUNTER
----- Message from Nadine Kinsey PA-C sent at 9/13/2018  5:00 PM CDT -----  Also HDL extremely low secondary to no activity over the last several months putting you at risk for heart disease.   Exercise needs to be started but slowly once the kidney fun

## 2018-09-14 NOTE — PLAN OF CARE
ANXIETY    • Will report anxiety at manageable levels Progressing        METABOLIC/FLUID AND ELECTROLYTES - ADULT    • Electrolytes maintained within normal limits Progressing        NEUROLOGICAL - ADULT    • Absence of seizures Progressing          Patien

## 2018-09-14 NOTE — H&P
OSITO HOSPITALIST  History and Physical     Jakpatrick Cohen Patient Status:  Emergency    10/12/1965 MRN [id number]   Location [address] [facility] Attending David Lozano MD   Hosp Day # 0 PCP Shalini Kate DO     Chief never used smokeless tobacco. She reports that she drinks alcohol. She reports that she does not use drugs.     Family History:   Family History   Problem Relation Age of Onset   • Diabetes Father    • Hypertension Father    • Hypertension Mother    • Yeimy Rocha Regular rate and rhythm. No murmurs, rubs or gallops. Equal pulses. Chest and Back: No tenderness or deformity. Abdomen: Soft, nontender, nondistended. Positive bowel sounds. No rebound, guarding or organomegaly.   Neurologic: No focal neurological defi

## 2018-09-14 NOTE — ED NOTES
Report to Georgette McGuffey Sudha, room still being cleaned. Mame Wiggins told RN to send pt via private care in about 15-20 mins.

## 2018-09-14 NOTE — TELEPHONE ENCOUNTER
The patient followed Darlyn's recommendations and went to the ER. Test results and Jenny Tripp PA-C, recommendations were sent via my chart. Future lab tests were ordered.

## 2018-09-14 NOTE — PROGRESS NOTES
[facility] Pharmacy Note:  Renal Dose Adjustment for Metoclopramide (REGLAN)    Serena Russo has been prescribed Metoclopramide (REGLAN) 10 mg every 8 hours as needed for nausea.     Estimated Creatinine Clearance: 23.6 mL/min (A) (based on SCr of 2.41 mg/dL

## 2018-09-14 NOTE — PAYOR COMM NOTE
--------------  ADMISSION REVIEW         9/13      ED        Stated Complaint: Dehydration, Abnormal labs     HPI     63-year-old female comes in the hospital because she states her physician told to come to the emergency room for further evaluation. Mitchell Vo   Sh

## 2018-09-14 NOTE — TELEPHONE ENCOUNTER
----- Message from Darwin Aguilar PA-C sent at 9/13/2018  4:49 PM CDT -----  Unable to reach patient did leave voice message on her private cell phone, at 4:35 PM on 9/13/18, that she does need IV fluids and possibly hospitalization due to  acute renal

## 2018-09-14 NOTE — PROGRESS NOTES
OSITO HOSPITALIST  Progress Note     Masoud Estrada Patient Status:  Observation    10/12/1965 MRN [id number]   [facility] 3NE-A Attending Madison Smalls, [address] Day # 0 PCP Terry Cabral DO     Chief Complaint: Abnormal labs    S mEq Oral Q4H   • nicotine  1 patch Transdermal Daily   • escitalopram  20 mg Oral Daily   • melatonin  9 mg Oral Nightly   • Acidophilus/Pectin  1 capsule Oral Daily   • Thiamine HCl  100 mg Oral Daily   • folic acid  1 mg Oral Daily   • Pantoprazole Sodiu

## 2018-09-15 VITALS
HEIGHT: 64 IN | HEART RATE: 70 BPM | SYSTOLIC BLOOD PRESSURE: 110 MMHG | RESPIRATION RATE: 22 BRPM | WEIGHT: 143.63 LBS | TEMPERATURE: 98 F | DIASTOLIC BLOOD PRESSURE: 80 MMHG | OXYGEN SATURATION: 95 % | BODY MASS INDEX: 24.52 KG/M2

## 2018-09-15 LAB
ALBUMIN SERPL-MCNC: 2 G/DL (ref 3.5–4.8)
ALBUMIN/GLOB SERPL: 0.8 {RATIO} (ref 1–2)
ALP LIVER SERPL-CCNC: 189 U/L (ref 41–108)
ALT SERPL-CCNC: 64 U/L (ref 14–54)
ANION GAP SERPL CALC-SCNC: 4 MMOL/L (ref 0–18)
AST SERPL-CCNC: 108 U/L (ref 15–41)
BILIRUB SERPL-MCNC: 2.3 MG/DL (ref 0.1–2)
BUN BLD-MCNC: 15 MG/DL (ref 8–20)
BUN/CREAT SERPL: 15.3 (ref 10–20)
CALCIUM BLD-MCNC: 7.5 MG/DL (ref 8.3–10.3)
CHLORIDE SERPL-SCNC: 115 MMOL/L (ref 101–111)
CO2 SERPL-SCNC: 24 MMOL/L (ref 22–32)
CREAT BLD-MCNC: 0.98 MG/DL (ref 0.55–1.02)
GLOBULIN PLAS-MCNC: 2.5 G/DL (ref 2.5–4)
GLUCOSE BLD-MCNC: 77 MG/DL (ref 70–99)
HAV IGM SER QL: 1.3 MG/DL (ref 1.8–2.5)
M PROTEIN MFR SERPL ELPH: 4.5 G/DL (ref 6.1–8.3)
OSMOLALITY SERPL CALC.SUM OF ELEC: 296 MOSM/KG (ref 275–295)
POTASSIUM SERPL-SCNC: 4.3 MMOL/L (ref 3.6–5.1)
SODIUM SERPL-SCNC: 143 MMOL/L (ref 136–144)

## 2018-09-15 PROCEDURE — 99239 HOSP IP/OBS DSCHRG MGMT >30: CPT | Performed by: HOSPITALIST

## 2018-09-15 RX ORDER — POTASSIUM CHLORIDE 20 MEQ/1
40 TABLET, EXTENDED RELEASE ORAL ONCE
Status: DISCONTINUED | OUTPATIENT
Start: 2018-09-15 | End: 2018-09-15

## 2018-09-15 RX ORDER — MAGNESIUM OXIDE 400 MG (241.3 MG MAGNESIUM) TABLET
800 TABLET ONCE
Status: COMPLETED | OUTPATIENT
Start: 2018-09-15 | End: 2018-09-15

## 2018-09-15 NOTE — PLAN OF CARE
Resumed Care of pt. At [address]. Pt. Is Ax4, calm, cooperative, and pleasant. Othostats Q shift-if 15 point systolic drop or change in HR, give 1L bolus. CiWA was D/Miguel by Day RN  Only call MD if B/P is in the 80's and pt. Is symptomatic.   BSC  Potassium

## 2018-09-15 NOTE — PLAN OF CARE
NURSING DISCHARGE NOTE    Discharged Home via Wheelchair. Accompanied by Family member  Belongings Taken by patient/family. Patient given discharge paperwork. Verbalizes understanding.

## 2018-09-17 ENCOUNTER — NURSE ONLY (OUTPATIENT)
Dept: FAMILY MEDICINE CLINIC | Facility: CLINIC | Age: 53
End: 2018-09-17
Payer: COMMERCIAL

## 2018-09-17 ENCOUNTER — PATIENT OUTREACH (OUTPATIENT)
Dept: CASE MANAGEMENT | Age: 53
End: 2018-09-17

## 2018-09-17 DIAGNOSIS — R74.01 TRANSAMINITIS: ICD-10-CM

## 2018-09-17 DIAGNOSIS — R74.8 ELEVATED LIVER ENZYMES: ICD-10-CM

## 2018-09-17 DIAGNOSIS — K76.0 FATTY LIVER: ICD-10-CM

## 2018-09-17 DIAGNOSIS — N17.9 ACUTE RENAL FAILURE, UNSPECIFIED ACUTE RENAL FAILURE TYPE (HCC): ICD-10-CM

## 2018-09-17 DIAGNOSIS — R39.15 URINARY URGENCY: ICD-10-CM

## 2018-09-17 DIAGNOSIS — R30.0 DYSURIA: Primary | ICD-10-CM

## 2018-09-17 DIAGNOSIS — N17.9 AKI (ACUTE KIDNEY INJURY) (HCC): ICD-10-CM

## 2018-09-17 DIAGNOSIS — Z72.0 TOBACCO ABUSE: ICD-10-CM

## 2018-09-17 LAB
APPEARANCE: CLEAR
GLUCOSE (URINE DIPSTICK): NEGATIVE MG/DL
MULTISTIX LOT#: NORMAL NUMERIC
NITRITE, URINE: NEGATIVE
PH, URINE: 6 (ref 4.5–8)
PROTEIN (URINE DIPSTICK): 100 MG/DL
SPECIFIC GRAVITY: 1.02 (ref 1–1.03)
UROBILINOGEN,SEMI-QN: 1 MG/DL (ref 0–1.9)

## 2018-09-17 PROCEDURE — 87088 URINE BACTERIA CULTURE: CPT | Performed by: FAMILY MEDICINE

## 2018-09-17 PROCEDURE — 81003 URINALYSIS AUTO W/O SCOPE: CPT | Performed by: FAMILY MEDICINE

## 2018-09-17 PROCEDURE — 87086 URINE CULTURE/COLONY COUNT: CPT | Performed by: FAMILY MEDICINE

## 2018-09-17 PROCEDURE — 87186 SC STD MICRODIL/AGAR DIL: CPT | Performed by: FAMILY MEDICINE

## 2018-09-17 NOTE — TELEPHONE ENCOUNTER
Orders in for CBC, CMP lipid 10/14/18    Staff did not call since she was in the hospital and they were repeating most of what was done. .  Add Magnesium and GGT to orders      She is taking Magnesium, U33 and folic acid correct? UTI sxs?   There are no

## 2018-09-17 NOTE — TELEPHONE ENCOUNTER
Pt called in and states that she was told by Micah Basurto that someone would call her back on Saturday with some test results but no one called her back. Pt states she was admitted to to the hospital for kidney failure and got back home Saturday.     She also

## 2018-09-17 NOTE — TELEPHONE ENCOUNTER
Spoke to lab ok to add both the GGT and the Magnesium. Msg is out to provider though since the Magnesium was just done on 9/15 does she still in fact want another one? GGT was added.     Spoke to patient with all instructions and pt verbalized understandi

## 2018-09-17 NOTE — TELEPHONE ENCOUNTER
Spoke to BODØ and pt is to restart the magnesium, folic acid and L06. Pt was informed as well. Spoke to Jah/lab and cancelled the GGT that was just added today in error.

## 2018-09-18 ENCOUNTER — TELEPHONE (OUTPATIENT)
Dept: FAMILY MEDICINE CLINIC | Facility: CLINIC | Age: 53
End: 2018-09-18

## 2018-09-18 LAB
7-AMINOCLONAZEPAM, URINE: <5 NG/ML
ALPHA-HYDROXYALPRAZOLAM, URINE: 45 NG/ML
ALPHA-HYDROXYMIDAZOLAM, URINE: <20 NG/ML
ALPRAZOLAM, URINE: 9 NG/ML
CHLORDIAZEPOXIDE, URINE: <20 NG/ML
CLONAZEPAM, URINE: <5 NG/ML
DIAZEPAM, URINE: <20 NG/ML
LORAZEPAM, URINE: 178 NG/ML
MIDAZOLAM, URINE: <20 NG/ML
NORDIAZEPAM, URINE: <20 NG/ML
OXAZEPAM, URINE: <20 NG/ML
TEMAZEPAM, URINE: <20 NG/ML

## 2018-09-18 NOTE — PROGRESS NOTES
Initial Post Discharge Follow Up   Discharge Date: 9/15/18  Contact Date: 9/17/2018    Consent Verification:  Assessment Completed With: Patient  HIPAA Verified?   Yes    Discharge Dx:    ARF, Tobacco abuse, LAMONTE, Transaminates, Fatty liver    General: Rfl:    Melatonin 3 MG Oral Cap Take 3 capsules by mouth nightly.  Disp:  Rfl:    ALPRAZolam 0.25 MG Oral Tab Take 1/2 tablet once a day and taper down as tolerated Disp: 20 tablet Rfl: 1   escitalopram 20 MG Oral Tab Take 1 tablet (20 mg total) by mouth da why you cannot make your appointments?    No      NCM Reviewed upcoming Specialist Appt with patient     Not Applicable at this time        Interventions by NCM:  NCM reviewed medications, discharge instructions, S&S of infection/blood clots, when to call t

## 2018-09-18 NOTE — TELEPHONE ENCOUNTER
Patient discharged from [facility] on 9/15/18, [facility] scheduled TCM HFU on 9/24/18 with Jane Moore, per patient's request.     Patient states she is feeling much better. Patient states dizzy spells have gone away.  Patient reports that yesterday she was sta

## 2018-09-18 NOTE — TELEPHONE ENCOUNTER
lmom for pharmacy confirming that this script was from our office. Asked pt after reviewing message to call office with any questions.

## 2018-09-19 NOTE — PROGRESS NOTES
Gram-negative bina, on ciprofloxacin still awaiting the urine culture sensitivity results.   Sent to my chart

## 2018-09-20 NOTE — PROGRESS NOTES
Ciprofloxacin is sensitive to E coli that grew on the culture. Send message if any continued problem.   Send to my chart

## 2018-09-24 ENCOUNTER — OFFICE VISIT (OUTPATIENT)
Dept: FAMILY MEDICINE CLINIC | Facility: CLINIC | Age: 53
End: 2018-09-24
Payer: COMMERCIAL

## 2018-09-24 VITALS
HEIGHT: 64.13 IN | DIASTOLIC BLOOD PRESSURE: 78 MMHG | SYSTOLIC BLOOD PRESSURE: 118 MMHG | HEART RATE: 76 BPM | BODY MASS INDEX: 26.46 KG/M2 | TEMPERATURE: 98 F | WEIGHT: 155 LBS

## 2018-09-24 DIAGNOSIS — R19.7 DIARRHEA, UNSPECIFIED TYPE: Primary | ICD-10-CM

## 2018-09-24 DIAGNOSIS — E87.6 HYPOKALEMIA: ICD-10-CM

## 2018-09-24 DIAGNOSIS — Z23 NEED FOR VACCINATION: ICD-10-CM

## 2018-09-24 DIAGNOSIS — N17.9 AKI (ACUTE KIDNEY INJURY) (HCC): ICD-10-CM

## 2018-09-24 DIAGNOSIS — R79.0 LOW MAGNESIUM LEVEL: ICD-10-CM

## 2018-09-24 DIAGNOSIS — R74.01 TRANSAMINITIS: ICD-10-CM

## 2018-09-24 DIAGNOSIS — D64.9 LOW HEMOGLOBIN: ICD-10-CM

## 2018-09-24 DIAGNOSIS — K70.10 ALCOHOLIC HEPATITIS WITHOUT ASCITES: ICD-10-CM

## 2018-09-24 DIAGNOSIS — F10.21 HISTORY OF ALCOHOL DEPENDENCE (HCC): ICD-10-CM

## 2018-09-24 PROCEDURE — 90471 IMMUNIZATION ADMIN: CPT | Performed by: FAMILY MEDICINE

## 2018-09-24 PROCEDURE — 1111F DSCHRG MED/CURRENT MED MERGE: CPT | Performed by: FAMILY MEDICINE

## 2018-09-24 PROCEDURE — 99496 TRANSJ CARE MGMT HIGH F2F 7D: CPT | Performed by: FAMILY MEDICINE

## 2018-09-24 PROCEDURE — 90746 HEPB VACCINE 3 DOSE ADULT IM: CPT | Performed by: FAMILY MEDICINE

## 2018-09-24 PROCEDURE — 90472 IMMUNIZATION ADMIN EACH ADD: CPT | Performed by: FAMILY MEDICINE

## 2018-09-24 PROCEDURE — 90632 HEPA VACCINE ADULT IM: CPT | Performed by: FAMILY MEDICINE

## 2018-09-24 NOTE — PROGRESS NOTES
HPI:    Madelyn Steele is a 46year old female here today for hospital follow up.    She was discharged from Inpatient hospital, [facility] to Home   Admission Date: 9/13/18   Discharge Date: 9/15/18  Hospital Discharge Diagnoses (since 8/25/2018 of new medication, and changes to current doses of medications and discontinued medications. HPI: Patient was seen initially at [facility] family medicine office with dizziness, muscle atrophy and weakness.   Blood tests were done patient was called that sita 1.30 - 6.70 x10(3) uL   2.61    Lymphocytes Absolute      0.90 - 4.00 x10(3) uL   0.87 (L)    Monocytes Absolute      0.10 - 1.00 x10(3) uL   0.32    Eosinophils Absolute      0.00 - 0.30 x10(3) uL   0.08    Basophils Absolute      0.00 - 0.10 x10(3) uL Known Allergies. Current Meds:    Current Outpatient Medications on File Prior to Visit:  TraZODone HCl 50 MG Oral Tab Take 1-2 tablets at night. Omeprazole Magnesium (PRILOSEC OTC) 20 MG Oral Tab EC Take 20 mg by mouth daily.    Melatonin 3 MG Oral Ca use drugs. ROS:   GENERAL: weight gain 10 pounds but is eating versus a week ago was not eating.   energy stable, no sweating  SKIN: denies any unusual skin lesions  EYES: denies blurred vision or double vision  HEENT: denies nasal congestion, sinus cyn foot with good balance was unable to perform this task at the last office visit. ASSESSMENT/ PLAN:   Diagnoses and all orders for this visit:    Diarrhea, unspecified type  -     COMP METABOLIC PANEL (14); Future  -     C. DIFFICILE(TOXIGENIC)PCR;  Fut Complexity of Data to Be Reviewed: moderate  · Risk of Significant Complications, Morbidity, and/or Mortality: moderate    Overall Risk:   moderate  Time spent was 45 minutes more than 50% was spent on counseling regarding medical conditions and treatment.

## 2018-09-25 ENCOUNTER — LAB ENCOUNTER (OUTPATIENT)
Dept: LAB | Age: 53
End: 2018-09-25
Attending: FAMILY MEDICINE

## 2018-09-25 DIAGNOSIS — R79.0 LOW MAGNESIUM LEVEL: ICD-10-CM

## 2018-09-25 DIAGNOSIS — R19.7 DIARRHEA, UNSPECIFIED TYPE: ICD-10-CM

## 2018-09-25 DIAGNOSIS — R74.01 TRANSAMINITIS: ICD-10-CM

## 2018-09-25 DIAGNOSIS — D64.9 LOW HEMOGLOBIN: ICD-10-CM

## 2018-09-25 LAB
ALBUMIN SERPL-MCNC: 2.5 G/DL (ref 3.5–4.8)
ALBUMIN/GLOB SERPL: 0.8 {RATIO} (ref 1–2)
ALP LIVER SERPL-CCNC: 183 U/L (ref 41–108)
ALT SERPL-CCNC: 70 U/L (ref 14–54)
ANION GAP SERPL CALC-SCNC: 11 MMOL/L (ref 0–18)
AST SERPL-CCNC: 109 U/L (ref 15–41)
BASOPHILS # BLD AUTO: 0.05 X10(3) UL (ref 0–0.1)
BASOPHILS NFR BLD AUTO: 0.9 %
BILIRUB SERPL-MCNC: 1.6 MG/DL (ref 0.1–2)
BUN BLD-MCNC: 6 MG/DL (ref 8–20)
BUN/CREAT SERPL: 8.8 (ref 10–20)
CALCIUM BLD-MCNC: 8.4 MG/DL (ref 8.3–10.3)
CHLORIDE SERPL-SCNC: 111 MMOL/L (ref 101–111)
CO2 SERPL-SCNC: 23 MMOL/L (ref 22–32)
CREAT BLD-MCNC: 0.68 MG/DL (ref 0.55–1.02)
EOSINOPHIL # BLD AUTO: 0.11 X10(3) UL (ref 0–0.3)
EOSINOPHIL NFR BLD AUTO: 2 %
ERYTHROCYTE [DISTWIDTH] IN BLOOD BY AUTOMATED COUNT: 12.2 % (ref 11.5–16)
GGT SERPL-CCNC: 650 U/L (ref 5–55)
GLOBULIN PLAS-MCNC: 3.2 G/DL (ref 2.5–4)
GLUCOSE BLD-MCNC: 80 MG/DL (ref 70–99)
HAV IGM SER QL: 1.5 MG/DL (ref 1.8–2.5)
HCT VFR BLD AUTO: 37.4 % (ref 34–50)
HGB BLD-MCNC: 12 G/DL (ref 12–16)
IMMATURE GRANULOCYTE COUNT: 0.01 X10(3) UL (ref 0–1)
IMMATURE GRANULOCYTE RATIO %: 0.2 %
LYMPHOCYTES # BLD AUTO: 1.54 X10(3) UL (ref 0.9–4)
LYMPHOCYTES NFR BLD AUTO: 27.8 %
M PROTEIN MFR SERPL ELPH: 5.7 G/DL (ref 6.1–8.3)
MCH RBC QN AUTO: 38.3 PG (ref 27–33.2)
MCHC RBC AUTO-ENTMCNC: 32.1 G/DL (ref 31–37)
MCV RBC AUTO: 119.5 FL (ref 81–100)
MONOCYTES # BLD AUTO: 0.27 X10(3) UL (ref 0.1–1)
MONOCYTES NFR BLD AUTO: 4.9 %
NEUTROPHIL ABS PRELIM: 3.56 X10 (3) UL (ref 1.3–6.7)
NEUTROPHILS # BLD AUTO: 3.56 X10(3) UL (ref 1.3–6.7)
NEUTROPHILS NFR BLD AUTO: 64.2 %
OSMOLALITY SERPL CALC.SUM OF ELEC: 297 MOSM/KG (ref 275–295)
PLATELET # BLD AUTO: 267 10(3)UL (ref 150–450)
POTASSIUM SERPL-SCNC: 3.7 MMOL/L (ref 3.6–5.1)
RBC # BLD AUTO: 3.13 X10(6)UL (ref 3.8–5.1)
RED CELL DISTRIBUTION WIDTH-SD: 54.4 FL (ref 35.1–46.3)
SODIUM SERPL-SCNC: 145 MMOL/L (ref 136–144)
WBC # BLD AUTO: 5.5 X10(3) UL (ref 4–13)

## 2018-09-25 PROCEDURE — 82977 ASSAY OF GGT: CPT | Performed by: FAMILY MEDICINE

## 2018-09-25 PROCEDURE — 80053 COMPREHEN METABOLIC PANEL: CPT | Performed by: FAMILY MEDICINE

## 2018-09-25 PROCEDURE — 85025 COMPLETE CBC W/AUTO DIFF WBC: CPT | Performed by: FAMILY MEDICINE

## 2018-09-25 PROCEDURE — 87045 FECES CULTURE AEROBIC BACT: CPT | Performed by: FAMILY MEDICINE

## 2018-09-25 PROCEDURE — 36415 COLL VENOUS BLD VENIPUNCTURE: CPT | Performed by: FAMILY MEDICINE

## 2018-09-25 PROCEDURE — 87493 C DIFF AMPLIFIED PROBE: CPT | Performed by: FAMILY MEDICINE

## 2018-09-25 PROCEDURE — 87427 SHIGA-LIKE TOXIN AG IA: CPT | Performed by: FAMILY MEDICINE

## 2018-09-25 PROCEDURE — 83735 ASSAY OF MAGNESIUM: CPT | Performed by: FAMILY MEDICINE

## 2018-09-25 PROCEDURE — 87046 STOOL CULTR AEROBIC BACT EA: CPT | Performed by: FAMILY MEDICINE

## 2018-09-26 ENCOUNTER — TELEPHONE (OUTPATIENT)
Dept: FAMILY MEDICINE CLINIC | Facility: CLINIC | Age: 53
End: 2018-09-26

## 2018-09-26 DIAGNOSIS — R79.0 LOW MAGNESIUM LEVEL: Primary | ICD-10-CM

## 2018-09-26 DIAGNOSIS — R71.8 ELEVATED MCV: ICD-10-CM

## 2018-09-26 DIAGNOSIS — R74.8 ELEVATED LIVER ENZYMES: ICD-10-CM

## 2018-09-26 RX ORDER — VANCOMYCIN HYDROCHLORIDE 250 MG/1
250 CAPSULE ORAL EVERY 6 HOURS
Qty: 40 CAPSULE | Refills: 0 | Status: SHIPPED | OUTPATIENT
Start: 2018-09-26 | End: 2018-10-06

## 2018-09-26 NOTE — TELEPHONE ENCOUNTER
[facility] lab confirmed that patient is positive for C.dif  Per Lizzie Bazan, rx for vancomycin 250 mg every 6 hours for 10 days has been sent to pharmacy    Patient advised of test results and also discussed contagion with the patient including hand washing ,usi

## 2018-09-26 NOTE — TELEPHONE ENCOUNTER
----- Message from Marie Chaudhary PA-C sent at 9/25/2018  8:39 PM CDT -----  Potassium is normal at 3.7. Kidney function remains normal.  Calcium has normalized. Liver function tests are stable but still elevated.   Bilirubin has normalized and protein

## 2018-09-26 NOTE — PROGRESS NOTES
Positive C. difficile start vancomycin 250 mg every 6 hours for 10 days. C. difficile is very contagious wash hands thoroughly after every time in the bathroom or if preparing food. Bleach bathroom after toilet use. Use a private bathroom when possible.

## 2018-09-26 NOTE — PROGRESS NOTES
Potassium is normal at 3.7. Kidney function remains normal.  Calcium has normalized. Liver function tests are stable but still elevated. Bilirubin has normalized and protein is slowly increasing. GGT is reduced by half.   Magnesium is still low take ove

## 2018-09-27 NOTE — DISCHARGE SUMMARY
[facility]  DISCHARGE SUMMARY     Ryan Julio Patient Status:  Inpatient    10/12/1965 MRN [id number]   [facility] 3NE-A Attending No att. providers found   Hosp Day # 2 PCP Jj Rosales DO     Date of Admission:  descriptions):  • None    Lab/Test results pending at Discharge:   · None    Consultants:  • None    Discharge Medication List:     Discharge Medications      CONTINUE taking these medications      Instructions Prescription details   [employer]

## 2018-09-28 ENCOUNTER — PATIENT MESSAGE (OUTPATIENT)
Dept: FAMILY MEDICINE CLINIC | Facility: CLINIC | Age: 53
End: 2018-09-28

## 2018-09-28 NOTE — PROGRESS NOTES
No Salmonella, Shigella, Campylobacter, Yersinia, Edwardsiella, Aeromonas or Plesiomonas isolated   Sent to my chart

## 2018-10-01 NOTE — TELEPHONE ENCOUNTER
Spoke to patient with instructions and she will call office back Wednesday/Thursday this week with an update

## 2018-10-01 NOTE — TELEPHONE ENCOUNTER
Pt states she went to [place] and they did not have enough of the med Vanco so she got 10 pills for right now. So her insurance would only take off $98 so she paid $500 for the 10 pills.     Then she got a Good Rx card and was told to go to [facility] by

## 2018-10-08 RX ORDER — ESCITALOPRAM OXALATE 20 MG/1
20 TABLET ORAL DAILY
Qty: 30 TABLET | Refills: 1 | Status: SHIPPED | OUTPATIENT
Start: 2018-10-08 | End: 2018-11-08

## 2018-10-29 RX ORDER — TRAZODONE HYDROCHLORIDE 50 MG/1
TABLET ORAL
Qty: 45 TABLET | Refills: 0 | Status: SHIPPED | OUTPATIENT
Start: 2018-10-29 | End: 2018-11-08

## 2018-11-08 ENCOUNTER — LAB ENCOUNTER (OUTPATIENT)
Dept: LAB | Age: 53
End: 2018-11-08
Attending: FAMILY MEDICINE
Payer: COMMERCIAL

## 2018-11-08 DIAGNOSIS — R74.8 ELEVATED LIVER ENZYMES: ICD-10-CM

## 2018-11-08 DIAGNOSIS — R79.0 LOW MAGNESIUM LEVEL: ICD-10-CM

## 2018-11-08 DIAGNOSIS — R71.8 ELEVATED MCV: ICD-10-CM

## 2018-11-08 PROCEDURE — 80053 COMPREHEN METABOLIC PANEL: CPT | Performed by: FAMILY MEDICINE

## 2018-11-08 PROCEDURE — 82248 BILIRUBIN DIRECT: CPT | Performed by: FAMILY MEDICINE

## 2018-11-08 PROCEDURE — 85025 COMPLETE CBC W/AUTO DIFF WBC: CPT | Performed by: FAMILY MEDICINE

## 2018-11-08 PROCEDURE — 83735 ASSAY OF MAGNESIUM: CPT | Performed by: FAMILY MEDICINE

## 2018-11-08 PROCEDURE — 36415 COLL VENOUS BLD VENIPUNCTURE: CPT | Performed by: FAMILY MEDICINE

## 2018-11-08 NOTE — PROGRESS NOTES
Marlinda Romberg is a 48year old female. HPI:   Patient is in for follow-up on generalized anxiety disorder, alcoholism, elevated liver function tests and C. Difficile.     C. difficile   symptoms have resolved   she is on a taper dose of vancomycin pre 20 MG Oral Tab Take 1 tablet (20 mg total) by mouth daily.  Disp: 30 tablet Rfl: 5   TraZODone HCl 50 MG Oral Tab TAKE 1 TO 2 TABLETS BY MOUTH AT NIGHT Disp: 45 tablet Rfl: 5   ALPRAZolam 0.25 MG Oral Tab Take 1/2 tablet once a day and taper down as tolerat No       REVIEW OF SYSTEMS:   GENERAL HEALTH: feels well otherwise  SKIN: denies any unusual skin lesions or rashes  RESPIRATORY: denies shortness of breath with exertion  CARDIOVASCULAR: denies chest pain on exertion  GI: denies abdominal pain and denies Imaging & Consults:  LEILA HILLIARD 2D+3D DIAGNOSTIC LEILA ROSADO (VWX=[id number])  1. Generalized anxiety disorder  Use, risks, benefits and precautions of alprazolam discussed.  Including not to drive, operate machinery or drink alcohol when taking this me NIGHT  Dispense: 45 tablet; Refill: 5    The patient indicates understanding of these issues and agrees to the plan. The patient is asked to return in complete physical in the next month. Sury Cordoba

## 2018-11-09 ENCOUNTER — TELEPHONE (OUTPATIENT)
Dept: FAMILY MEDICINE CLINIC | Facility: CLINIC | Age: 53
End: 2018-11-09

## 2018-11-09 DIAGNOSIS — R71.8 ELEVATED MCV: Primary | ICD-10-CM

## 2018-11-09 NOTE — TELEPHONE ENCOUNTER
----- Message from Mayur Zavala PA-C sent at 11/8/2018  6:58 PM CST -----  Magnesium and CMP are normal.  Liver function tests have returned to normal and kidney tests are normal.  CBC still elevated MCV but much improved continue taking P79 and folic

## 2018-11-09 NOTE — PROGRESS NOTES
Magnesium and CMP are normal.  Liver function tests have returned to normal and kidney tests are normal.  CBC still elevated MCV but much improved continue taking N41 and folic acid repeat CBC in 3 months.   Overall blood work is showing that your body is h

## 2018-11-13 ENCOUNTER — OFFICE VISIT (OUTPATIENT)
Dept: FAMILY MEDICINE CLINIC | Facility: CLINIC | Age: 53
End: 2018-11-13
Payer: COMMERCIAL

## 2018-11-13 VITALS
WEIGHT: 154 LBS | HEIGHT: 64.25 IN | BODY MASS INDEX: 26.29 KG/M2 | SYSTOLIC BLOOD PRESSURE: 130 MMHG | HEART RATE: 72 BPM | DIASTOLIC BLOOD PRESSURE: 80 MMHG

## 2018-11-13 DIAGNOSIS — Z72.0 TOBACCO ABUSE: ICD-10-CM

## 2018-11-13 DIAGNOSIS — R60.0 EDEMA LEG: ICD-10-CM

## 2018-11-13 DIAGNOSIS — Z12.11 COLON CANCER SCREENING: ICD-10-CM

## 2018-11-13 DIAGNOSIS — Z01.419 WELL FEMALE EXAM WITH ROUTINE GYNECOLOGICAL EXAM: Primary | ICD-10-CM

## 2018-11-13 DIAGNOSIS — K63.5 POLYP OF COLON, UNSPECIFIED PART OF COLON, UNSPECIFIED TYPE: ICD-10-CM

## 2018-11-13 DIAGNOSIS — Z78.0 POST-MENOPAUSAL: ICD-10-CM

## 2018-11-13 DIAGNOSIS — N95.2 VAGINAL ATROPHY: ICD-10-CM

## 2018-11-13 DIAGNOSIS — Z12.4 SCREENING FOR MALIGNANT NEOPLASM OF CERVIX: ICD-10-CM

## 2018-11-13 DIAGNOSIS — Z13.820 SCREENING FOR OSTEOPOROSIS: ICD-10-CM

## 2018-11-13 PROCEDURE — 99396 PREV VISIT EST AGE 40-64: CPT | Performed by: FAMILY MEDICINE

## 2018-11-13 PROCEDURE — 88175 CYTOPATH C/V AUTO FLUID REDO: CPT | Performed by: FAMILY MEDICINE

## 2018-11-13 PROCEDURE — 87624 HPV HI-RISK TYP POOLED RSLT: CPT | Performed by: FAMILY MEDICINE

## 2018-11-13 RX ORDER — HYDROCHLOROTHIAZIDE 12.5 MG/1
12.5 CAPSULE, GELATIN COATED ORAL DAILY
Qty: 30 CAPSULE | Refills: 3 | Status: SHIPPED | OUTPATIENT
Start: 2018-11-13 | End: 2021-04-14

## 2018-11-13 NOTE — PROGRESS NOTES
HPI:   Gianni Bernal is a 48year old female who presents for a complete physical exam.      Symptoms: is menopausal. No bleeding.   Abnormal pap normal in past is due 2017   Sexually active no   Last colonoscopy UTD due 11/14/18 polyps; no FH colon ca capsule Rfl: 0   Omeprazole Magnesium (PRILOSEC OTC) 20 MG Oral Tab EC Take 20 mg by mouth daily. Disp:  Rfl:    Melatonin 3 MG Oral Cap Take 3 capsules by mouth nightly.  Disp:  Rfl:    Probiotic Product (PROBIOTIC PEARLS) Oral Cap Take 1 capsule by mouth Smokeless tobacco: Never Used    Alcohol use: No      Alcohol/week: 6.0 oz      Types: 10 Glasses of wine per week      Frequency: Never      Binge frequency: Never      Comment: last drink 1 week ago    Drug use: No    Occ: not working. : engaged. normal, normal discharge and normal cervix. Bimanual exam normal no adnexal masses or cervical motion tenderness, PAP was done    MUSCULOSKELETAL: gait moises,l no gross M/S defect.   EXTREMITIES: no clubbing, cyanosis, or edema  NEURO: oriented times three type  - GASTRO - INTERNAL    4. Tobacco abuse  Smoking cessation  5. Post-menopausal  - XR DEXA BONE DENSITOMETRY (CPT=77080); Future    6. Screening for osteoporosis  - XR DEXA BONE DENSITOMETRY (CPT=77080); Future  - THINPREP PAP SMEAR B    7.  Colon canc

## 2018-11-14 PROBLEM — E87.6 HYPOKALEMIA: Status: RESOLVED | Noted: 2018-09-13 | Resolved: 2018-11-14

## 2018-11-14 PROBLEM — R74.8 ELEVATED LIVER ENZYMES: Status: RESOLVED | Noted: 2018-09-13 | Resolved: 2018-11-14

## 2018-11-20 RX ORDER — TRAZODONE HYDROCHLORIDE 50 MG/1
TABLET ORAL
Qty: 45 TABLET | Refills: 0 | OUTPATIENT
Start: 2018-11-20

## 2018-12-11 ENCOUNTER — TELEPHONE (OUTPATIENT)
Dept: FAMILY MEDICINE CLINIC | Facility: CLINIC | Age: 53
End: 2018-12-11

## 2018-12-12 DIAGNOSIS — F41.1 GENERALIZED ANXIETY DISORDER: ICD-10-CM

## 2018-12-12 RX ORDER — ALPRAZOLAM 0.25 MG/1
TABLET ORAL
Qty: 20 TABLET | Refills: 0 | Status: SHIPPED | OUTPATIENT
Start: 2018-12-12 | End: 2018-12-30

## 2018-12-17 ENCOUNTER — PATIENT MESSAGE (OUTPATIENT)
Dept: FAMILY MEDICINE CLINIC | Facility: CLINIC | Age: 53
End: 2018-12-17

## 2018-12-17 NOTE — TELEPHONE ENCOUNTER
Regarding: FW: Other  Contact: [phone]  We can write a note that she is under our care for a medical condition and she is under our care. She can be excused for the day she missed due to this medical condition. Sury Cordoba   ----- Message -----  From: Theresa Delcid

## 2018-12-30 DIAGNOSIS — F41.1 GENERALIZED ANXIETY DISORDER: ICD-10-CM

## 2018-12-31 ENCOUNTER — PATIENT MESSAGE (OUTPATIENT)
Dept: FAMILY MEDICINE CLINIC | Facility: CLINIC | Age: 53
End: 2018-12-31

## 2018-12-31 ENCOUNTER — TELEPHONE (OUTPATIENT)
Dept: FAMILY MEDICINE CLINIC | Facility: CLINIC | Age: 53
End: 2018-12-31

## 2018-12-31 DIAGNOSIS — R10.9 ABDOMINAL PAIN, UNSPECIFIED ABDOMINAL LOCATION: Primary | ICD-10-CM

## 2018-12-31 RX ORDER — ALPRAZOLAM 0.25 MG/1
TABLET ORAL
Qty: 20 TABLET | Refills: 0 | Status: SHIPPED | OUTPATIENT
Start: 2018-12-31 | End: 2019-01-17

## 2018-12-31 NOTE — TELEPHONE ENCOUNTER
Left message for patient to please go to ER or [facility] for possible diverticulitis flare per pcp  Please also let them know that you were recently treated for c. dif

## 2019-01-03 NOTE — TELEPHONE ENCOUNTER
Bobbetta Sicard, PA-C 1/3/2019 1:58 PM CST     Sorry due to recent C dif and risk for reoccurrence needs documentation that she has Diverticulitis before antibiotics started. CT abdomen and pelvis IV contrast only.   If this is your 3rd diverticulitis zay

## 2019-01-04 NOTE — TELEPHONE ENCOUNTER
S/w patient regarding request for abx and advised her that per Aleisha Singleton patient must have a CT done before anything can be prescribed for her due to her recent C.dif infection. An order for CT has been placed.  Patient informed that she can have test done

## 2019-01-09 RX ORDER — ESCITALOPRAM OXALATE 20 MG/1
TABLET ORAL
Qty: 30 TABLET | Refills: 0 | OUTPATIENT
Start: 2019-01-09

## 2019-01-09 NOTE — PROGRESS NOTES
CT order placed in system. Pt informed via her ALKALINE WATERhart.   Would wait until insurance approves test.

## 2019-01-09 NOTE — TELEPHONE ENCOUNTER
Regarding: RE: Non-Urgent Medical Question  Contact: [phone]  ----- Message from [address] [place] [address] sent at 1/8/2019  4:39 PM CST -----    Ok  ----- Message -----  From: Alicia Rich  Sent: 1/8/2019  4:28 PM CST  To: Marlinda Romberg  Subject: RE: July Mittal

## 2019-01-16 ENCOUNTER — APPOINTMENT (OUTPATIENT)
Dept: CT IMAGING | Age: 54
End: 2019-01-16
Attending: EMERGENCY MEDICINE
Payer: COMMERCIAL

## 2019-01-16 ENCOUNTER — HOSPITAL ENCOUNTER (EMERGENCY)
Age: 54
Discharge: HOME | End: 2019-01-16
Attending: EMERGENCY MEDICINE
Payer: COMMERCIAL

## 2019-01-16 VITALS
WEIGHT: 152 LBS | HEIGHT: 64 IN | SYSTOLIC BLOOD PRESSURE: 157 MMHG | BODY MASS INDEX: 25.95 KG/M2 | RESPIRATION RATE: 16 BRPM | HEART RATE: 96 BPM | DIASTOLIC BLOOD PRESSURE: 98 MMHG | TEMPERATURE: 98 F | OXYGEN SATURATION: 97 %

## 2019-01-16 DIAGNOSIS — K57.92 ACUTE DIVERTICULITIS: Primary | ICD-10-CM

## 2019-01-16 LAB
ALBUMIN SERPL-MCNC: 3 G/DL (ref 3.1–4.5)
ALBUMIN/GLOB SERPL: 0.8 {RATIO} (ref 1–2)
ALP LIVER SERPL-CCNC: 255 U/L (ref 41–108)
ALT SERPL-CCNC: 36 U/L (ref 14–54)
ANION GAP SERPL CALC-SCNC: 9 MMOL/L (ref 0–18)
AST SERPL-CCNC: 47 U/L (ref 15–41)
BASOPHILS # BLD AUTO: 0.03 X10(3) UL (ref 0–0.1)
BASOPHILS NFR BLD AUTO: 0.3 %
BILIRUB SERPL-MCNC: 0.6 MG/DL (ref 0.1–2)
BILIRUB UR QL STRIP.AUTO: NEGATIVE
BUN BLD-MCNC: 6 MG/DL (ref 8–20)
BUN/CREAT SERPL: 8.3 (ref 10–20)
CALCIUM BLD-MCNC: 8.6 MG/DL (ref 8.3–10.3)
CHLORIDE SERPL-SCNC: 102 MMOL/L (ref 101–111)
CO2 SERPL-SCNC: 23 MMOL/L (ref 22–32)
COLOR UR AUTO: YELLOW
CREAT BLD-MCNC: 0.72 MG/DL (ref 0.55–1.02)
EOSINOPHIL # BLD AUTO: 0.02 X10(3) UL (ref 0–0.3)
EOSINOPHIL NFR BLD AUTO: 0.2 %
ERYTHROCYTE [DISTWIDTH] IN BLOOD BY AUTOMATED COUNT: 14.6 % (ref 11.5–16)
GLOBULIN PLAS-MCNC: 3.6 G/DL (ref 2.8–4.4)
GLUCOSE BLD-MCNC: 88 MG/DL (ref 70–99)
GLUCOSE UR STRIP.AUTO-MCNC: NEGATIVE MG/DL
HCT VFR BLD AUTO: 40 % (ref 34–50)
HGB BLD-MCNC: 13.8 G/DL (ref 12–16)
IMMATURE GRANULOCYTE COUNT: 0.04 X10(3) UL (ref 0–1)
IMMATURE GRANULOCYTE RATIO %: 0.4 %
KETONES UR STRIP.AUTO-MCNC: NEGATIVE MG/DL
LEUKOCYTE ESTERASE UR QL STRIP.AUTO: NEGATIVE
LIPASE: 95 U/L (ref 73–393)
LYMPHOCYTES # BLD AUTO: 1.18 X10(3) UL (ref 0.9–4)
LYMPHOCYTES NFR BLD AUTO: 12.5 %
M PROTEIN MFR SERPL ELPH: 6.6 G/DL (ref 6.4–8.2)
MCH RBC QN AUTO: 34.1 PG (ref 27–33.2)
MCHC RBC AUTO-ENTMCNC: 34.5 G/DL (ref 31–37)
MCV RBC AUTO: 98.8 FL (ref 81–100)
MONOCYTES # BLD AUTO: 0.58 X10(3) UL (ref 0.1–1)
MONOCYTES NFR BLD AUTO: 6.2 %
NEUTROPHIL ABS PRELIM: 7.57 X10 (3) UL (ref 1.3–6.7)
NEUTROPHILS # BLD AUTO: 7.57 X10(3) UL (ref 1.3–6.7)
NEUTROPHILS NFR BLD AUTO: 80.4 %
NITRITE UR QL STRIP.AUTO: NEGATIVE
OSMOLALITY SERPL CALC.SUM OF ELEC: 275 MOSM/KG (ref 275–295)
PH UR STRIP.AUTO: 5.5 [PH] (ref 4.5–8)
PLATELET # BLD AUTO: 200 10(3)UL (ref 150–450)
POTASSIUM SERPL-SCNC: 3.4 MMOL/L (ref 3.6–5.1)
PROT UR STRIP.AUTO-MCNC: NEGATIVE MG/DL
RBC # BLD AUTO: 4.05 X10(6)UL (ref 3.8–5.1)
RBC UR QL AUTO: NEGATIVE
RED CELL DISTRIBUTION WIDTH-SD: 52 FL (ref 35.1–46.3)
SODIUM SERPL-SCNC: 134 MMOL/L (ref 136–144)
SP GR UR STRIP.AUTO: 1.02 (ref 1–1.03)
UROBILINOGEN UR STRIP.AUTO-MCNC: 1 MG/DL
WBC # BLD AUTO: 9.4 X10(3) UL (ref 4–13)

## 2019-01-16 PROCEDURE — 99285 EMERGENCY DEPT VISIT HI MDM: CPT

## 2019-01-16 PROCEDURE — 99284 EMERGENCY DEPT VISIT MOD MDM: CPT

## 2019-01-16 PROCEDURE — 85025 COMPLETE CBC W/AUTO DIFF WBC: CPT | Performed by: EMERGENCY MEDICINE

## 2019-01-16 PROCEDURE — 83690 ASSAY OF LIPASE: CPT | Performed by: EMERGENCY MEDICINE

## 2019-01-16 PROCEDURE — 74177 CT ABD & PELVIS W/CONTRAST: CPT | Performed by: EMERGENCY MEDICINE

## 2019-01-16 PROCEDURE — 81003 URINALYSIS AUTO W/O SCOPE: CPT | Performed by: EMERGENCY MEDICINE

## 2019-01-16 PROCEDURE — 36415 COLL VENOUS BLD VENIPUNCTURE: CPT

## 2019-01-16 PROCEDURE — 80053 COMPREHEN METABOLIC PANEL: CPT | Performed by: EMERGENCY MEDICINE

## 2019-01-16 RX ORDER — METRONIDAZOLE 500 MG/1
500 TABLET ORAL 3 TIMES DAILY
Qty: 42 TABLET | Refills: 0 | Status: SHIPPED | OUTPATIENT
Start: 2019-01-16 | End: 2019-01-30

## 2019-01-16 RX ORDER — LEVOFLOXACIN 500 MG/1
500 TABLET, FILM COATED ORAL DAILY
Qty: 14 TABLET | Refills: 0 | Status: SHIPPED | OUTPATIENT
Start: 2019-01-16 | End: 2019-01-30

## 2019-01-17 ENCOUNTER — PATIENT MESSAGE (OUTPATIENT)
Dept: FAMILY MEDICINE CLINIC | Facility: CLINIC | Age: 54
End: 2019-01-17

## 2019-01-17 DIAGNOSIS — F41.1 GENERALIZED ANXIETY DISORDER: ICD-10-CM

## 2019-01-17 RX ORDER — ALPRAZOLAM 0.25 MG/1
TABLET ORAL
Qty: 20 TABLET | Refills: 0 | OUTPATIENT
Start: 2019-01-17 | End: 2019-02-06

## 2019-01-17 NOTE — TELEPHONE ENCOUNTER
From: Michael Min  To: Shawanda Hurst PA-C  Sent: 1/17/2019 7:19 AM CST  Subject: Non-Urgent Medical Question    [place] Alex been a rough week. I had to put my Ana to sleep on Monday.  Then I was in the ER yesterday with acute diverticulitis

## 2019-01-17 NOTE — ED PROVIDER NOTES
Patient Seen in: THE [facility] [place]    History   Patient presents with:  Abdomen/Flank Pain (GI/)    Stated Complaint: abd pain     HPI    51-year-old female complaining of left lower quadrant abdominal pain.   Patient states that Binge frequency: Never      Comment: last drink 1 week ago    Drug use: No      Review of Systems    Positive for stated complaint: abd pain   Other systems are as noted in HPI. Constitutional and vital signs reviewed.       All other systems reviewed and Status                     ---------                               -----------         ------                     CBC W/ DIFFERENTIAL[[id number]]          Abnormal            Final result                 Please view results for these tests on the individual days.  I also advised her of the possibility of a subtle colon cancer and that she will need to follow through with colonoscopy. I advised her clear liquid diet for the next 2 days and advance diet with a low fiber diet.             Disposition and Plan

## 2019-02-06 DIAGNOSIS — F41.1 GENERALIZED ANXIETY DISORDER: ICD-10-CM

## 2019-02-07 RX ORDER — ALPRAZOLAM 0.25 MG/1
TABLET ORAL
Qty: 20 TABLET | Refills: 0 | Status: SHIPPED | OUTPATIENT
Start: 2019-02-07 | End: 2019-02-28

## 2019-02-07 NOTE — TELEPHONE ENCOUNTER
Spoke to patient and she said she had to put 2 of her fur babies down because she was a complete wreck and sometimes took 2-3 times a day but she is now back to 1/2 tablet. She states she has 3 pills left.     Please advise

## 2019-02-18 ENCOUNTER — TELEPHONE (OUTPATIENT)
Dept: FAMILY MEDICINE CLINIC | Facility: CLINIC | Age: 54
End: 2019-02-18

## 2019-02-18 NOTE — TELEPHONE ENCOUNTER
Received Medical Records request from [facility] [employer] for the past 5 years. Request sent to [employer] on 2/18 via green bag.

## 2019-02-28 DIAGNOSIS — F41.1 GENERALIZED ANXIETY DISORDER: ICD-10-CM

## 2019-02-28 RX ORDER — ALPRAZOLAM 0.25 MG/1
TABLET ORAL
Qty: 20 TABLET | Refills: 0 | Status: SHIPPED | OUTPATIENT
Start: 2019-02-28 | End: 2019-03-27

## 2019-03-09 DIAGNOSIS — F51.01 PRIMARY INSOMNIA: ICD-10-CM

## 2019-03-11 RX ORDER — TRAZODONE HYDROCHLORIDE 50 MG/1
TABLET ORAL
Qty: 45 TABLET | Refills: 1 | Status: SHIPPED | OUTPATIENT
Start: 2019-03-11 | End: 2019-04-15

## 2019-03-26 DIAGNOSIS — F41.1 GENERALIZED ANXIETY DISORDER: ICD-10-CM

## 2019-03-26 RX ORDER — ALPRAZOLAM 0.25 MG/1
TABLET ORAL
Qty: 20 TABLET | Refills: 0 | OUTPATIENT
Start: 2019-03-26

## 2019-03-27 ENCOUNTER — TELEPHONE (OUTPATIENT)
Dept: FAMILY MEDICINE CLINIC | Facility: CLINIC | Age: 54
End: 2019-03-27

## 2019-03-27 DIAGNOSIS — F41.1 GENERALIZED ANXIETY DISORDER: ICD-10-CM

## 2019-03-28 RX ORDER — ALPRAZOLAM 0.25 MG/1
TABLET ORAL
Qty: 12 TABLET | Refills: 0 | Status: SHIPPED | OUTPATIENT
Start: 2019-03-28 | End: 2019-04-16

## 2019-04-15 DIAGNOSIS — F51.01 PRIMARY INSOMNIA: ICD-10-CM

## 2019-04-15 DIAGNOSIS — F41.1 GENERALIZED ANXIETY DISORDER: ICD-10-CM

## 2019-04-15 RX ORDER — TRAZODONE HYDROCHLORIDE 50 MG/1
TABLET ORAL
Qty: 45 TABLET | Refills: 0 | Status: SHIPPED | OUTPATIENT
Start: 2019-04-15 | End: 2019-05-05

## 2019-04-15 RX ORDER — ALPRAZOLAM 0.25 MG/1
TABLET ORAL
Qty: 12 TABLET | Refills: 0 | OUTPATIENT
Start: 2019-04-15

## 2019-04-16 DIAGNOSIS — F41.1 GENERALIZED ANXIETY DISORDER: ICD-10-CM

## 2019-04-16 RX ORDER — ALPRAZOLAM 0.25 MG/1
TABLET ORAL
Qty: 12 TABLET | Refills: 1 | Status: SHIPPED | OUTPATIENT
Start: 2019-04-16 | End: 2019-05-09

## 2019-04-17 DIAGNOSIS — F51.01 PRIMARY INSOMNIA: ICD-10-CM

## 2019-04-17 RX ORDER — TRAZODONE HYDROCHLORIDE 50 MG/1
TABLET ORAL
Qty: 45 TABLET | Refills: 0 | OUTPATIENT
Start: 2019-04-17

## 2019-05-05 DIAGNOSIS — F51.01 PRIMARY INSOMNIA: ICD-10-CM

## 2019-05-07 RX ORDER — TRAZODONE HYDROCHLORIDE 50 MG/1
TABLET ORAL
Qty: 45 TABLET | Refills: 0 | Status: SHIPPED | OUTPATIENT
Start: 2019-05-07 | End: 2019-05-26

## 2019-05-09 DIAGNOSIS — F41.1 GENERALIZED ANXIETY DISORDER: ICD-10-CM

## 2019-05-09 RX ORDER — ALPRAZOLAM 0.25 MG/1
TABLET ORAL
Qty: 12 TABLET | Refills: 0 | Status: SHIPPED | OUTPATIENT
Start: 2019-05-09 | End: 2019-05-30

## 2019-05-26 DIAGNOSIS — F51.01 PRIMARY INSOMNIA: ICD-10-CM

## 2019-05-28 RX ORDER — TRAZODONE HYDROCHLORIDE 50 MG/1
TABLET ORAL
Qty: 45 TABLET | Refills: 0 | Status: SHIPPED | OUTPATIENT
Start: 2019-05-28 | End: 2019-06-16

## 2019-05-30 DIAGNOSIS — F41.1 GENERALIZED ANXIETY DISORDER: ICD-10-CM

## 2019-05-30 RX ORDER — ALPRAZOLAM 0.25 MG/1
TABLET ORAL
Qty: 12 TABLET | Refills: 0 | Status: SHIPPED | OUTPATIENT
Start: 2019-05-30 | End: 2019-06-21

## 2019-06-16 DIAGNOSIS — F51.01 PRIMARY INSOMNIA: ICD-10-CM

## 2019-06-17 DIAGNOSIS — F51.01 PRIMARY INSOMNIA: ICD-10-CM

## 2019-06-17 RX ORDER — TRAZODONE HYDROCHLORIDE 50 MG/1
TABLET ORAL
Qty: 45 TABLET | Refills: 0 | Status: SHIPPED | OUTPATIENT
Start: 2019-06-17 | End: 2019-07-05

## 2019-06-18 RX ORDER — TRAZODONE HYDROCHLORIDE 50 MG/1
TABLET ORAL
Qty: 45 TABLET | Refills: 0 | OUTPATIENT
Start: 2019-06-18

## 2019-06-21 DIAGNOSIS — F41.1 GENERALIZED ANXIETY DISORDER: ICD-10-CM

## 2019-06-21 RX ORDER — ALPRAZOLAM 0.25 MG/1
TABLET ORAL
Qty: 10 TABLET | Refills: 0 | Status: SHIPPED | OUTPATIENT
Start: 2019-06-21 | End: 2019-07-11

## 2019-07-05 ENCOUNTER — PATIENT MESSAGE (OUTPATIENT)
Dept: FAMILY MEDICINE CLINIC | Facility: CLINIC | Age: 54
End: 2019-07-05

## 2019-07-05 DIAGNOSIS — F51.01 PRIMARY INSOMNIA: ICD-10-CM

## 2019-07-05 RX ORDER — TRAZODONE HYDROCHLORIDE 50 MG/1
TABLET ORAL
Qty: 180 TABLET | Refills: 0 | Status: SHIPPED | OUTPATIENT
Start: 2019-07-05 | End: 2019-08-15

## 2019-07-05 NOTE — TELEPHONE ENCOUNTER
From: Gianni Bernal  To: Emmanuel Hoover PA-C  Sent: 7/5/2019 10:03 AM CDT  Subject: Prescription Question    Hi  My trazodone is out! [facility] said this needs to be a 90 day prescription or I would have to pay full price.  Can you please send the 90 day

## 2019-07-07 DIAGNOSIS — F51.01 PRIMARY INSOMNIA: ICD-10-CM

## 2019-07-08 DIAGNOSIS — F41.1 GENERALIZED ANXIETY DISORDER: ICD-10-CM

## 2019-07-08 RX ORDER — ALPRAZOLAM 0.25 MG/1
TABLET ORAL
Qty: 10 TABLET | Refills: 0 | Status: CANCELLED | OUTPATIENT
Start: 2019-07-08

## 2019-07-09 DIAGNOSIS — F41.1 GENERALIZED ANXIETY DISORDER: ICD-10-CM

## 2019-07-09 RX ORDER — ALPRAZOLAM 0.25 MG/1
TABLET ORAL
Qty: 10 TABLET | Refills: 0 | Status: CANCELLED | OUTPATIENT
Start: 2019-07-09

## 2019-07-10 RX ORDER — TRAZODONE HYDROCHLORIDE 50 MG/1
TABLET ORAL
Qty: 45 TABLET | Refills: 0 | OUTPATIENT
Start: 2019-07-10

## 2019-07-10 NOTE — TELEPHONE ENCOUNTER
Pt called to change her appt from Friday to 7/18/19 because she has a job interview. She is completely out of Xanax. She wants to know if she can have some on hand for her interview otherwise she will be a nervous wreck. [facility].

## 2019-07-11 ENCOUNTER — PATIENT MESSAGE (OUTPATIENT)
Dept: FAMILY MEDICINE CLINIC | Facility: CLINIC | Age: 54
End: 2019-07-11

## 2019-07-11 DIAGNOSIS — F41.1 GENERALIZED ANXIETY DISORDER: ICD-10-CM

## 2019-07-11 RX ORDER — ALPRAZOLAM 0.25 MG/1
TABLET ORAL
Qty: 10 TABLET | Refills: 0 | Status: SHIPPED | OUTPATIENT
Start: 2019-07-11 | End: 2019-08-06

## 2019-07-12 ENCOUNTER — TELEPHONE (OUTPATIENT)
Dept: FAMILY MEDICINE CLINIC | Facility: CLINIC | Age: 54
End: 2019-07-12

## 2019-07-12 NOTE — TELEPHONE ENCOUNTER
Kaylee Arellano is calling to let Canelo Villalba know that her prescription for her Alprazolam was not at the [facility] pharmacy when she went there today. She would like us to call the pharmacy to check on her prescription.  Please call Kaylee Arellano at [phone] with any Diegoster Noel

## 2019-08-05 ENCOUNTER — PATIENT MESSAGE (OUTPATIENT)
Dept: FAMILY MEDICINE CLINIC | Facility: CLINIC | Age: 54
End: 2019-08-05

## 2019-08-05 NOTE — TELEPHONE ENCOUNTER
From: Lorenza Cancel  To: Oscar Corbett PA-C  Sent: 8/5/2019 9:43 AM CDT  Subject: Non-Urgent Medical Question    Hi  My pharmacy just called and said my prescription was denied for Xanax. My annual physical is not due until November.  I’m only ranjan

## 2019-08-05 NOTE — PROGRESS NOTES
Phoned patient and information given. She made an appointment. Script phoned in to [facility] in [place].

## 2019-08-06 DIAGNOSIS — F41.1 GENERALIZED ANXIETY DISORDER: ICD-10-CM

## 2019-08-06 RX ORDER — ALPRAZOLAM 0.25 MG/1
TABLET ORAL
Qty: 10 TABLET | Refills: 0 | OUTPATIENT
Start: 2019-08-06 | End: 2019-08-15

## 2019-08-15 NOTE — PROGRESS NOTES
Raquel Child is a 48year old female. HPI:    Generalized anxiety disorder  Patient is requesting refill on Xanax has been using 1/2 to 5 mg once a day. States alcohol is every other day 1 to 2 glasses of wine does not drink alcohol with Xanax.   St 90 tablet Rfl: 1   omeprazole 20 MG Oral Capsule Delayed Release Take 1 capsule (20 mg total) by mouth every morning before breakfast. Disp: 90 capsule Rfl: 1   Probiotic Product (PROBIOTIC PEARLS) Oral Cap Take 1 capsule by mouth daily.  Disp:  Rfl:    Mul denies chest pain on exertion  GI: denies abdominal pain and uses PPI for GERD under good control  NEURO: denies headaches  Musculoskeletal: No motor deficits  Psych see above  EXAM:   /84 (BP Location: Right arm, Patient Position: Sitting, Cuff Size mg total) by mouth every morning before breakfast.       Imaging & Consults:  None  1. Generalized anxiety disorder  Use, risks, benefits and precautions of alprazolam discussed.  Including not to drive, operate machinery or drink alcohol when taking this m frequent meals and avoid eating 3-4 hours before bedtime, try to raise the head of bed. - omeprazole 20 MG Oral Capsule Delayed Release; Take 1 capsule (20 mg total) by mouth every morning before breakfast.  Dispense: 90 capsule; Refill: 1    4.  History

## 2019-08-19 ENCOUNTER — PATIENT MESSAGE (OUTPATIENT)
Dept: FAMILY MEDICINE CLINIC | Facility: CLINIC | Age: 54
End: 2019-08-19

## 2019-08-19 ENCOUNTER — TELEPHONE (OUTPATIENT)
Dept: FAMILY MEDICINE CLINIC | Facility: CLINIC | Age: 54
End: 2019-08-19

## 2019-08-19 NOTE — TELEPHONE ENCOUNTER
From: Jayna Michelle  To: Nicola Willis PA-C  Sent: 8/19/2019 1:15 PM CDT  Subject: Non-Urgent Medical Question    Hi! I saw Lucia Ashby on Thursday and she handed me a prescription for Xanax, however it has [employer] and not Roxene Gell.  Is

## 2019-08-19 NOTE — TELEPHONE ENCOUNTER
Patient called because her last name on file was not changed to [facility], advised patient we need an ID with her updated last name [facility] in order to physically change it but patient is not willing to drive to us to bring new ID and would like to discuss issue w

## 2019-08-19 NOTE — TELEPHONE ENCOUNTER
From: Gerson Casey  To: Lorin Vincent PA-C  Sent: 8/19/2019 2:19 PM CDT  Subject: Non-Urgent Medical Question    Della Chikisgabriella  I was just in there Thursday and gave them my new medical card to have it updated. Check my file they took a copy!      Stephanie Fearing

## 2019-08-20 ENCOUNTER — PATIENT MESSAGE (OUTPATIENT)
Dept: FAMILY MEDICINE CLINIC | Facility: CLINIC | Age: 54
End: 2019-08-20

## 2019-08-20 NOTE — TELEPHONE ENCOUNTER
From: Edu Henry  To: Morris Padgett PA-C  Sent: 8/19/2019 3:05 PM CDT  Subject: Non-Urgent Medical Question    I did all that! This is crazy. Have Nick Duran now call in my prescription.

## 2019-08-20 NOTE — TELEPHONE ENCOUNTER
From: Rosy Wells  To: Markus Parekh PA-C  Sent: 8/20/2019 12:28 PM CDT  Subject: Prescription Question    That’s correct! However the script Richie Franks printed for me still has Jerardo Carranza. Plz have Richie Franks call me.      Zayra Fuentes

## 2019-08-20 NOTE — TELEPHONE ENCOUNTER
Spoke with patient and she will fax a copy of her drivers licence. Information will be updated and Xanax script will be faxed under new name.

## 2019-08-23 ENCOUNTER — PATIENT MESSAGE (OUTPATIENT)
Dept: FAMILY MEDICINE CLINIC | Facility: CLINIC | Age: 54
End: 2019-08-23

## 2019-08-23 NOTE — TELEPHONE ENCOUNTER
From: Alida Check  To: Sarai Mccracken PA-C  Sent: 8/23/2019 8:56 AM CDT  Subject: Non-Urgent Medical Question    [place]  Can I fax it? I can’t get there today and I’m out.  Monica check with Marilee Campbell

## 2019-08-23 NOTE — TELEPHONE ENCOUNTER
From: Lobo Melara  To: Capo Moss PA-C  Sent: 8/23/2019 10:12 AM CDT  Subject: Non-Urgent Medical Question    [facility] and [facility]?

## 2019-08-23 NOTE — TELEPHONE ENCOUNTER
From: Edil Garza  To: Shawanda Hurst PA-C  Sent: 8/23/2019 7:24 AM CDT  Subject: Non-Urgent Medical Question    Hi  Have you sent over the [facility]? My file was updated with my correct last name which is Felipe Martin!  The print out

## 2019-08-26 RX ORDER — ESCITALOPRAM OXALATE 20 MG/1
TABLET ORAL
Qty: 90 TABLET | Refills: 0 | OUTPATIENT
Start: 2019-08-26

## 2019-08-26 NOTE — TELEPHONE ENCOUNTER
Rx denied as it is duplicate request.     escitalopram 10 MG Oral Tab 90 tablet 1 8/15/2019     Sig - Route:  Take 1 tablet (10 mg total) by mouth daily. - Oral      Sent to [facility] 8/15/2019

## 2019-08-26 NOTE — TELEPHONE ENCOUNTER
PER OV 8/15/19  . Generalized anxiety disorder  Use, risks, benefits and precautions of alprazolam discussed. Including not to drive, operate machinery or drink alcohol when taking this medication.   Restart Lexapro 10 mg   Discussed if any problems call of

## 2019-08-27 ENCOUNTER — TELEPHONE (OUTPATIENT)
Dept: FAMILY MEDICINE CLINIC | Facility: CLINIC | Age: 54
End: 2019-08-27

## 2019-09-25 DIAGNOSIS — F51.01 PRIMARY INSOMNIA: ICD-10-CM

## 2019-09-25 RX ORDER — TRAZODONE HYDROCHLORIDE 50 MG/1
TABLET ORAL
Qty: 180 TABLET | Refills: 0 | OUTPATIENT
Start: 2019-09-25

## 2019-09-25 NOTE — TELEPHONE ENCOUNTER
Trazadone 50mg was Denied. Dose was increased:  traZODone HCl 100 MG Oral Tab 90 tablet 1 8/15/2019    Sig:   Take 1 tablet (100 mg total) by mouth nightly.      Route:   Oral

## 2019-09-29 DIAGNOSIS — F51.01 PRIMARY INSOMNIA: ICD-10-CM

## 2019-09-30 RX ORDER — TRAZODONE HYDROCHLORIDE 50 MG/1
TABLET ORAL
Qty: 180 TABLET | Refills: 0 | OUTPATIENT
Start: 2019-09-30

## 2019-09-30 NOTE — TELEPHONE ENCOUNTER
Pt requesting refill of    traZODone HCl 100 MG Oral Tab 90 tablet 1 8/15/2019    Sig:   Take 1 tablet (100 mg total) by mouth nightly. Rx denied.  Refill request too soon

## 2019-10-09 ENCOUNTER — PATIENT MESSAGE (OUTPATIENT)
Dept: FAMILY MEDICINE CLINIC | Facility: CLINIC | Age: 54
End: 2019-10-09

## 2019-10-09 NOTE — TELEPHONE ENCOUNTER
From: Moira Sampson  To: Anselmo Gil PA-C  Sent: 10/9/2019 10:10 AM CDT  Subject: Prescription Question    Hi  I have a refill for my Xanax however the pharmacy won’t fill it. Can you tell me why?     Oly Mcneill

## 2019-10-14 DIAGNOSIS — F51.01 PRIMARY INSOMNIA: ICD-10-CM

## 2019-10-14 RX ORDER — TRAZODONE HYDROCHLORIDE 50 MG/1
TABLET ORAL
Qty: 180 TABLET | Refills: 0 | OUTPATIENT
Start: 2019-10-14

## 2019-11-15 ENCOUNTER — PATIENT MESSAGE (OUTPATIENT)
Dept: FAMILY MEDICINE CLINIC | Facility: CLINIC | Age: 54
End: 2019-11-15

## 2019-11-15 DIAGNOSIS — F51.05 INSOMNIA SECONDARY TO ANXIETY: ICD-10-CM

## 2019-11-15 DIAGNOSIS — F41.9 INSOMNIA SECONDARY TO ANXIETY: ICD-10-CM

## 2019-11-15 RX ORDER — TRAZODONE HYDROCHLORIDE 100 MG/1
100 TABLET ORAL NIGHTLY
Qty: 90 TABLET | Refills: 1 | OUTPATIENT
Start: 2019-11-15

## 2019-11-15 NOTE — TELEPHONE ENCOUNTER
From: Kate Wilson  To: Nirav Benavides PA-C  Sent: 11/15/2019 8:15 AM CST  Subject: Other    Minal  The pharmacy has been trying to refill my trazodone 50 mg which I’m not on anymore. Danielle Russell put me on 100 mg which is what I’m taking now.  I’m out and

## 2019-11-15 NOTE — TELEPHONE ENCOUNTER
From: Abelino Matters  To: Adina Rojas PA-C  Sent: 11/15/2019 7:31 AM CST  Subject: Joey Malloy Captain gave me paperwork for blood work and I can’t find them.  Can I still come to the office for the blood work or can you send me copies in the mail t

## 2019-12-07 DIAGNOSIS — K21.9 GASTROESOPHAGEAL REFLUX DISEASE WITHOUT ESOPHAGITIS: ICD-10-CM

## 2019-12-09 RX ORDER — OMEPRAZOLE 20 MG/1
CAPSULE, DELAYED RELEASE ORAL
Qty: 90 CAPSULE | Refills: 1 | Status: SHIPPED | OUTPATIENT
Start: 2019-12-09 | End: 2020-06-01

## 2019-12-09 NOTE — TELEPHONE ENCOUNTER
Pt requesting refill of OMEPRAZOLE 20 MG Oral Capsule Delayed Release    Last Time Medication was Filled: 8/15 for 90 plus one    Last Office Visit with PCP: 8/15/19       No future appointments.     Refill denied as request is too soon

## 2019-12-12 DIAGNOSIS — F41.1 GENERALIZED ANXIETY DISORDER: ICD-10-CM

## 2019-12-12 RX ORDER — ALPRAZOLAM 0.25 MG/1
TABLET ORAL
Qty: 15 TABLET | Refills: 2 | Status: SHIPPED | OUTPATIENT
Start: 2019-12-12 | End: 2020-03-06

## 2019-12-12 NOTE — TELEPHONE ENCOUNTER
Patient requesting refill on ALPRAZOLAM.  Last fill was 10/9/19 15 tabs x2 refills. Last OV was 8/15/19.  Pended for your approval.

## 2020-01-30 ENCOUNTER — PATIENT MESSAGE (OUTPATIENT)
Dept: FAMILY MEDICINE CLINIC | Facility: CLINIC | Age: 55
End: 2020-01-30

## 2020-01-30 NOTE — TELEPHONE ENCOUNTER
From: Michael Johnson  To: Miladys Brown PA-C  Sent: 1/30/2020 12:15 PM CST  Subject: Non-Urgent Medical Question    Hi! I stopped taking my Lexapro awhile ago. Could that be a reason why I’m having dizzy spells? Can u ask Chelsie Santiago?  I remember her sayin

## 2020-02-10 RX ORDER — TRAZODONE HYDROCHLORIDE 100 MG/1
TABLET ORAL
Qty: 90 TABLET | Refills: 1 | OUTPATIENT
Start: 2020-02-10

## 2020-02-10 NOTE — TELEPHONE ENCOUNTER
Requested Prescriptions     Refused Prescriptions Disp Refills   • TRAZODONE  MG Oral Tab [Pharmacy Med Name: TRAZODONE 100 MG TABLET] 90 tablet 1     Sig: TAKE 1 TABLET BY MOUTH EVERY DAY AT NIGHT     Refused By: Dinesh FONTANEZ     Reason for Refu

## 2020-02-20 ENCOUNTER — TELEPHONE (OUTPATIENT)
Dept: FAMILY MEDICINE CLINIC | Facility: CLINIC | Age: 55
End: 2020-02-20

## 2020-02-20 DIAGNOSIS — Z12.31 ENCOUNTER FOR SCREENING MAMMOGRAM FOR MALIGNANT NEOPLASM OF BREAST: Primary | ICD-10-CM

## 2020-02-20 NOTE — TELEPHONE ENCOUNTER
Altagracia Tuttle is calling to get her Mammogram order with Ultrasound put in so she can call and schedule her appt,

## 2020-02-21 NOTE — PROGRESS NOTES
MCV is quite high refer to hematology also check M86 and folic acid. The hemoglobin is also elevated probably from smoking. MCV up probably secondary to alcohol consumption please call patient and discuss what alcohol she is drinking.   Liver function test

## 2020-02-22 ENCOUNTER — PATIENT MESSAGE (OUTPATIENT)
Dept: FAMILY MEDICINE CLINIC | Facility: CLINIC | Age: 55
End: 2020-02-22

## 2020-02-24 ENCOUNTER — TELEPHONE (OUTPATIENT)
Dept: HEMATOLOGY/ONCOLOGY | Facility: HOSPITAL | Age: 55
End: 2020-02-24

## 2020-02-24 ENCOUNTER — PATIENT MESSAGE (OUTPATIENT)
Dept: FAMILY MEDICINE CLINIC | Facility: CLINIC | Age: 55
End: 2020-02-24

## 2020-02-24 ENCOUNTER — TELEPHONE (OUTPATIENT)
Dept: FAMILY MEDICINE CLINIC | Facility: CLINIC | Age: 55
End: 2020-02-24

## 2020-02-24 DIAGNOSIS — R79.89 ABNORMAL CBC: Primary | ICD-10-CM

## 2020-02-24 NOTE — TELEPHONE ENCOUNTER
From: Michael Johnson  To: Miladys Brown PA-C  Sent: 2/22/2020 7:12 AM CST  Subject: Test Results Question    Good Morning   I had my blood work done on Thursday. When will a Marcos Larios have my results?     Maren Francis

## 2020-02-24 NOTE — TELEPHONE ENCOUNTER
Patient has appointment with Dr. Marc Lopez for abnormal labs on 4/21/20 in [place]. Offered to come to [place] to be scheduled earlier, but said that she prefers [place] because it's closer for her. Patient was ok with her current appointment date.

## 2020-02-24 NOTE — TELEPHONE ENCOUNTER
----- Message from Dane Philippe PA-C sent at 2/21/2020  4:02 PM CST -----  MCV is quite high refer to hematology also check B44 and folic acid. The hemoglobin is also elevated probably from smoking.  MCV up probably secondary to alcohol consumption pl

## 2020-02-24 NOTE — TELEPHONE ENCOUNTER
June called to schedule a consult with  for abnormal CBC referred by Gabby Rodriguez. The next available is 4/14, and the patient was okay with this. Would you like to see this patient sooner that this or is 4/14 okay?

## 2020-02-24 NOTE — TELEPHONE ENCOUNTER
From: Victoria Medina  To: Mayur Zavala PA-C  Sent: 2/24/2020 9:02 AM CST  Subject: Test Results Question    Latanya Saini  I looked at my results and my red blood cells were 4.23 and the standard is 3.80-5. 10. Am I missing something?  Also I did not see my l

## 2020-02-25 ENCOUNTER — PATIENT MESSAGE (OUTPATIENT)
Dept: FAMILY MEDICINE CLINIC | Facility: CLINIC | Age: 55
End: 2020-02-25

## 2020-02-25 NOTE — TELEPHONE ENCOUNTER
From: Avani Aguilera  To:  Adriana Corona PA-C  Sent: 2/25/2020 10:42 AM CST  Subject: Non-Urgent Medical Question    Can you send me a my chart sign up code so I can create an account for my      Aida

## 2020-02-26 ENCOUNTER — HOSPITAL ENCOUNTER (OUTPATIENT)
Dept: MAMMOGRAPHY | Age: 55
Discharge: HOME | End: 2020-02-26
Attending: FAMILY MEDICINE
Payer: COMMERCIAL

## 2020-02-26 DIAGNOSIS — Z12.31 ENCOUNTER FOR SCREENING MAMMOGRAM FOR MALIGNANT NEOPLASM OF BREAST: ICD-10-CM

## 2020-02-26 PROCEDURE — 77063 BREAST TOMOSYNTHESIS BI: CPT | Performed by: FAMILY MEDICINE

## 2020-02-26 PROCEDURE — 77067 SCR MAMMO BI INCL CAD: CPT | Performed by: FAMILY MEDICINE

## 2020-02-27 ENCOUNTER — PATIENT MESSAGE (OUTPATIENT)
Dept: FAMILY MEDICINE CLINIC | Facility: CLINIC | Age: 55
End: 2020-02-27

## 2020-02-27 DIAGNOSIS — K21.9 GASTROESOPHAGEAL REFLUX DISEASE WITHOUT ESOPHAGITIS: ICD-10-CM

## 2020-02-27 DIAGNOSIS — E53.8 LOW FOLATE: Primary | ICD-10-CM

## 2020-02-27 NOTE — TELEPHONE ENCOUNTER
Folate level is low start taking OTC Folic acid 973 mcg daily. Repeat CBC and folic acid in 3 months if she has not gone to hematologist    Sorry result never came to me.

## 2020-02-27 NOTE — TELEPHONE ENCOUNTER
From: Jesenia Mccarthy  To: Pedro Marroquin PA-C  Sent: 2/27/2020 12:05 PM CST  Subject: Test Results Question    Have you received my Folic acid and O53 results yet? I’ve received my other results and mammogram results already.

## 2020-02-27 NOTE — TELEPHONE ENCOUNTER
Also ideally if she wants to have the active form of folic acid which I prefer which is folate or l-methylfolate there are several products on the Internet she can purchase.   One such product is pure encapsulations vitamin B complex which has the l-methylf

## 2020-03-04 ENCOUNTER — PATIENT MESSAGE (OUTPATIENT)
Dept: FAMILY MEDICINE CLINIC | Facility: CLINIC | Age: 55
End: 2020-03-04

## 2020-03-04 NOTE — TELEPHONE ENCOUNTER
From: Carley Rosales  To: Amrit Egan PA-C  Sent: 3/4/2020 7:29 AM CST  Subject: Test Results Question    Per Avelino Sandy I bought this (see attachment). Do I also need to take 551 mg of folic acid?

## 2020-03-06 DIAGNOSIS — F41.1 GENERALIZED ANXIETY DISORDER: ICD-10-CM

## 2020-03-06 RX ORDER — ALPRAZOLAM 0.25 MG/1
TABLET ORAL
Qty: 15 TABLET | Refills: 2 | Status: SHIPPED | OUTPATIENT
Start: 2020-03-06 | End: 2020-05-27

## 2020-03-06 NOTE — TELEPHONE ENCOUNTER
Refill request for ALPRAZOLAM 0.25MG. Last fill was 12/12/19 15 tabs 2 refills. Last OV 8/15/19. Future OV 3/19/20. Pended.

## 2020-03-14 ENCOUNTER — TELEPHONE (OUTPATIENT)
Dept: FAMILY MEDICINE CLINIC | Facility: CLINIC | Age: 55
End: 2020-03-14

## 2020-03-14 RX ORDER — NABUMETONE 500 MG/1
500 TABLET, FILM COATED ORAL 2 TIMES DAILY PRN
Qty: 30 TABLET | Refills: 0 | Status: SHIPPED | OUTPATIENT
Start: 2020-03-14 | End: 2020-07-27

## 2020-03-14 NOTE — TELEPHONE ENCOUNTER
Called on call with complaint of shoulder pain - not improving with ibuprofen 400 (this upsets her stomach)    Has appt with PCP next week    Stop ibuprofen    Start nabumetone with food bid  Ok for pepcid as needed    Call office on Monday if not improvin

## 2020-03-16 ENCOUNTER — PATIENT MESSAGE (OUTPATIENT)
Dept: FAMILY MEDICINE CLINIC | Facility: CLINIC | Age: 55
End: 2020-03-16

## 2020-03-16 RX ORDER — HYDROXYZINE HYDROCHLORIDE 25 MG/1
25 TABLET, FILM COATED ORAL NIGHTLY PRN
Qty: 30 TABLET | Refills: 2 | Status: SHIPPED | OUTPATIENT
Start: 2020-03-16 | End: 2020-06-08

## 2020-03-16 RX ORDER — METHYLPREDNISOLONE 4 MG/1
TABLET ORAL
Qty: 1 KIT | Refills: 0 | Status: SHIPPED | OUTPATIENT
Start: 2020-03-16 | End: 2020-05-01 | Stop reason: ALTCHOICE

## 2020-03-16 NOTE — TELEPHONE ENCOUNTER
Stop the NSAID (ibuprofen, Nabumetone Alev) give her a trial of Medrol dose Murray for the neck pain    Trial of Atarax 25 mg at night for insomnia #30 2 refill also add Valerian root OTC

## 2020-03-16 NOTE — TELEPHONE ENCOUNTER
From: Kate Wilson  To: Nirav Benavides PA-C  Sent: 3/16/2020 11:49 AM CDT  Subject: Other    Hi  I canceled my non emergency visit this Thursday due to this virus going around. 1). The trazadon does not seem to help me anymore unless I take a few a

## 2020-03-16 NOTE — TELEPHONE ENCOUNTER
Pt requesting refill of TRAZODONE  MG Oral Tab    Last Time Medication was Filled:  1/3/2020    Last Office Visit with Provider:8/15/19  No future appointments.

## 2020-03-17 ENCOUNTER — PATIENT MESSAGE (OUTPATIENT)
Dept: FAMILY MEDICINE CLINIC | Facility: CLINIC | Age: 55
End: 2020-03-17

## 2020-03-18 NOTE — TELEPHONE ENCOUNTER
From: Rey San  To: Arely Apple PA-C  Sent: 3/17/2020 9:01 PM CDT  Subject: Non-Urgent Medical Question    Allen Castillo  What is my blood type?     Rayo Rooney

## 2020-04-06 ENCOUNTER — TELEPHONE (OUTPATIENT)
Dept: HEMATOLOGY/ONCOLOGY | Facility: HOSPITAL | Age: 55
End: 2020-04-06

## 2020-04-06 NOTE — TELEPHONE ENCOUNTER
LVM for patient regarding her upcoming appt with dr Bea Montoya. I let her know we are taking precautions due to the corona virus to minimize patient/community exposure.    I let her know Dr Vaibhav Muñoz reviewed her chart and feels it's safe to reschedule in 1-3 mo

## 2020-04-14 ENCOUNTER — APPOINTMENT (OUTPATIENT)
Dept: HEMATOLOGY/ONCOLOGY | Age: 55
End: 2020-04-14
Attending: INTERNAL MEDICINE
Payer: COMMERCIAL

## 2020-05-01 ENCOUNTER — TELEPHONE (OUTPATIENT)
Dept: FAMILY MEDICINE CLINIC | Facility: CLINIC | Age: 55
End: 2020-05-01

## 2020-05-01 ENCOUNTER — VIRTUAL PHONE E/M (OUTPATIENT)
Dept: FAMILY MEDICINE CLINIC | Facility: CLINIC | Age: 55
End: 2020-05-01
Payer: COMMERCIAL

## 2020-05-01 DIAGNOSIS — F10.21 HISTORY OF ALCOHOL DEPENDENCE (HCC): ICD-10-CM

## 2020-05-01 DIAGNOSIS — K76.0 FATTY LIVER: ICD-10-CM

## 2020-05-01 DIAGNOSIS — I10 ESSENTIAL HYPERTENSION: ICD-10-CM

## 2020-05-01 DIAGNOSIS — R19.7 NAUSEA VOMITING AND DIARRHEA: ICD-10-CM

## 2020-05-01 DIAGNOSIS — J06.9 VIRAL UPPER RESPIRATORY TRACT INFECTION WITH COUGH: Primary | ICD-10-CM

## 2020-05-01 DIAGNOSIS — R11.2 NAUSEA VOMITING AND DIARRHEA: ICD-10-CM

## 2020-05-01 DIAGNOSIS — R50.9 FEVER AND CHILLS: ICD-10-CM

## 2020-05-01 DIAGNOSIS — Z72.0 TOBACCO ABUSE: ICD-10-CM

## 2020-05-01 PROBLEM — F10.10 ALCOHOL ABUSE: Status: RESOLVED | Noted: 2018-09-13 | Resolved: 2020-05-01

## 2020-05-01 PROCEDURE — 99214 OFFICE O/P EST MOD 30 MIN: CPT | Performed by: FAMILY MEDICINE

## 2020-05-01 NOTE — PROGRESS NOTES
Virtual Telephone Check-In    Rey San verbally consents to a Virtual/Telephone Check-In visit on 05/01/20. Patient understands and accepts financial responsibility for any deductible, co-insurance and/or co-pays associated with this service.     D and Gatorade. Current Outpatient Medications   Medication Sig Dispense Refill   • TRAZODONE  MG Oral Tab TAKE 1.5 TABLETS (150 MG TOTAL) BY MOUTH NIGHTLY.  135 tablet 0   • hydrOXYzine HCl 25 MG Oral Tab Take 1 tablet (25 mg total) by mouth nightly Social History:  Social History    Tobacco Use      Smoking status: Current Every Day Smoker        Packs/day: 0.50        Years: 29.00        Pack years: 14.5        Types: Cigarettes      Smokeless tobacco: Never Used    Alcohol use:  Yes      Alcohol/w steams increase humidity. Patient was advised to stay home in isolation for at least 14 days after first becoming ill with 72 hours fever free with resolving respiratory symptoms. Instructed to review CDC. gov COVID-19 website for suggestions on how to m

## 2020-05-01 NOTE — TELEPHONE ENCOUNTER
Patient reports fever, highest 102.9 last night, body aches since Wednesday and diarrhea started yesterday. Patient also c/o phlegm in the throat which causes her to cough.      States she has been staying home for the last few weeks and has not had contact

## 2020-05-01 NOTE — PATIENT INSTRUCTIONS
Coronavirus Disease 2019 (COVID-19)     [facility] [address] is committed to the safety and well-being of our patients, members, employees, and communities.  As concerns arise about the new strain of coronavirus that causes COVID-19, [facility] [address] 9. Avoid sharing personal items with other people in your household, like dishes, towels, and bedding   10. Clean all surfaces that are touched often, like counters, tabletops, and doorknobs.  Use household cleaning sprays or wipes according to the label in Please call your primary care provider within 2 days of your discharge to arrange for a telehealth follow-up.   Additional Information      You can also get more information at the following websites:   Centers for Disease Control & Prevention (CDC)  What t

## 2020-05-01 NOTE — TELEPHONE ENCOUNTER
Pt called and said she had a fever yesterday of 102.9, she took Tylenol and it went down. This morning she took it and it was 100.0 and she took more Tylenol. I asked Genaro Lynn if I should add her to the schedule and she said Maryln Denver needs to triage her.

## 2020-05-04 ENCOUNTER — VIRTUAL PHONE E/M (OUTPATIENT)
Dept: FAMILY MEDICINE CLINIC | Facility: CLINIC | Age: 55
End: 2020-05-04
Payer: COMMERCIAL

## 2020-05-04 ENCOUNTER — PATIENT MESSAGE (OUTPATIENT)
Dept: FAMILY MEDICINE CLINIC | Facility: CLINIC | Age: 55
End: 2020-05-04

## 2020-05-04 ENCOUNTER — LAB ENCOUNTER (OUTPATIENT)
Dept: LAB | Facility: HOSPITAL | Age: 55
End: 2020-05-04
Attending: FAMILY MEDICINE
Payer: COMMERCIAL

## 2020-05-04 DIAGNOSIS — B34.9 SYSTEMIC VIRAL ILLNESS: Primary | ICD-10-CM

## 2020-05-04 DIAGNOSIS — Z72.0 TOBACCO ABUSE: ICD-10-CM

## 2020-05-04 DIAGNOSIS — R50.9 FEVER AND CHILLS: ICD-10-CM

## 2020-05-04 DIAGNOSIS — R05.8 DRY COUGH: ICD-10-CM

## 2020-05-04 DIAGNOSIS — K76.0 FATTY LIVER: ICD-10-CM

## 2020-05-04 DIAGNOSIS — J06.9 VIRAL UPPER RESPIRATORY TRACT INFECTION WITH COUGH: ICD-10-CM

## 2020-05-04 DIAGNOSIS — F10.21 HISTORY OF ALCOHOL DEPENDENCE (HCC): ICD-10-CM

## 2020-05-04 DIAGNOSIS — A08.4 VIRAL DIARRHEA: ICD-10-CM

## 2020-05-04 DIAGNOSIS — R19.7 NAUSEA VOMITING AND DIARRHEA: ICD-10-CM

## 2020-05-04 DIAGNOSIS — R11.2 NAUSEA VOMITING AND DIARRHEA: ICD-10-CM

## 2020-05-04 DIAGNOSIS — I10 ESSENTIAL HYPERTENSION: ICD-10-CM

## 2020-05-04 PROCEDURE — 99213 OFFICE O/P EST LOW 20 MIN: CPT | Performed by: FAMILY MEDICINE

## 2020-05-04 RX ORDER — AZITHROMYCIN 250 MG/1
TABLET, FILM COATED ORAL
Qty: 6 TABLET | Refills: 0 | Status: SHIPPED | OUTPATIENT
Start: 2020-05-04 | End: 2020-05-09

## 2020-05-04 NOTE — TELEPHONE ENCOUNTER
Patient states she is taking Tylenol 500mg every 4 hours, this bring temp down about 4 degrees but then comes up again. Patient does not want to take Ibuprofen at this time  Denies any other symptoms. States she is maintaining hydration.  Denies sob and

## 2020-05-04 NOTE — PROGRESS NOTES
Virtual Telephone Check-In    Rey San verbally consents to a Virtual/Telephone Check-In visit on 05/04/20. Patient understands and accepts financial responsibility for any deductible, co-insurance and/or co-pays associated with this service.     D 2 (two) times daily as needed for Pain.  30 tablet 0   • ALPRAZOLAM 0.25 MG Oral Tab TAKE 1/2 TO ONE TABLET ONCE A DAY AND TAPER DOWN AS TOLERATED 15 tablet 2   • OMEPRAZOLE 20 MG Oral Capsule Delayed Release TAKE 1 CAPSULE BY MOUTH EVERY DAY IN THE MORNING month      Drinks per session: 1 or 2      Binge frequency: Never    Drug use: No       REVIEW OF SYSTEMS:   GENERAL HEALTH: see above fever, chills and body aches.   SKIN: denies any unusual skin lesions or rashes  RESPIRATORY: denies shortness of breath w diet   Awaiting COVID-19 test results had COVID testing this morning  Encouraged her if there is any dizziness or signs of dehydration to go to the emergency room. The patient indicates understanding of these issues and agrees to the plan.   The patient is

## 2020-05-04 NOTE — TELEPHONE ENCOUNTER
From: Roosevelt Montano  To: Christopher Robbins PA-C  Sent: 5/4/2020 10:18 AM CDT  Subject: Non-Urgent Medical Question    Hi Darlyn   I went to at [facility] for my Covid testing. I woke up with 103.3 temperature. What I’m taking is not working so well.  I fell lik

## 2020-05-05 ENCOUNTER — TELEPHONE (OUTPATIENT)
Dept: FAMILY MEDICINE CLINIC | Facility: CLINIC | Age: 55
End: 2020-05-05

## 2020-05-05 NOTE — TELEPHONE ENCOUNTER
COVID SCREENING FOR COVID-19 PATIENTS:    1. Do you have a fever above 100.4? Yes, 102.1 at 2:00 a.m. and current temp: 98.6 at 9:00 a.m.    2. Are you having any shortness of Breath? No    a. Are you breathing ok? Yes    3.  Do you have any other signs/sym

## 2020-05-05 NOTE — PROGRESS NOTES
Negative for COVID-19 please update tomorrow after starting Z-Murray.   May need to do extra testing including blood tests, stool cultures/C. difficile and possible chest x-ray if symptoms persist.  Patient notified via my chart and telephone call on 5/4/2020

## 2020-05-06 NOTE — TELEPHONE ENCOUNTER
COVID SCREENING FOR COVID-19 PATIENTS:    1. Do you have a fever above 100. 4? No fever today with no Tylenol. Current temp: 98.3 earlier today     2. Are you having any shortness of Breath? No     a. Are you breathing ok? Yes     3.  Do you have any other s

## 2020-05-07 NOTE — TELEPHONE ENCOUNTER
COVID SCREENING FOR COVID-19 PATIENTS:     1. Do you have a fever above 100. 4? No.  Current temp: 98.3 at 8:30 a.m.     2. Are you having any shortness of Breath? No     a. Are you breathing ok? Yes     3.  Do you have any other signs/symptoms of illness? Y

## 2020-05-08 NOTE — TELEPHONE ENCOUNTER
Possibly pneumonia at some point we  will check her for COVID-19 antibodies but I would like to wait till THE [facility] has a good blood test for checking. Probably in the next month.

## 2020-05-08 NOTE — TELEPHONE ENCOUNTER
COVID SCREENING FOR COVID-19 PATIENTS:     1. Do you have a fever above 100. 4? No.  Current temp: 97.6 at 8:00 a.m.     2. Are you having any shortness of Breath? No     a. Are you breathing ok? Yes     3.  Do you have any other signs/symptoms of illness? Y

## 2020-05-08 NOTE — TELEPHONE ENCOUNTER
Per Christopher Robbins PA-C, a message was left on the patient's confidential voicemail asking the patient to call back with a condition update.

## 2020-05-15 ENCOUNTER — PATIENT MESSAGE (OUTPATIENT)
Dept: FAMILY MEDICINE CLINIC | Facility: CLINIC | Age: 55
End: 2020-05-15

## 2020-05-15 NOTE — TELEPHONE ENCOUNTER
From: Nate Lamas  To: Nuria Jones PA-C  Sent: 5/15/2020 9:35 AM CDT  Subject: Non-Urgent Medical Question    Hi Mayelin Mello been feeling good, however Suresh Brandon been getting sinus headaches the past few days. What can I take for this ?     Hope your

## 2020-05-27 DIAGNOSIS — F41.1 GENERALIZED ANXIETY DISORDER: ICD-10-CM

## 2020-05-27 DIAGNOSIS — F41.9 INSOMNIA SECONDARY TO ANXIETY: ICD-10-CM

## 2020-05-27 DIAGNOSIS — F51.05 INSOMNIA SECONDARY TO ANXIETY: ICD-10-CM

## 2020-05-27 RX ORDER — TRAZODONE HYDROCHLORIDE 100 MG/1
150 TABLET ORAL NIGHTLY
Qty: 135 TABLET | Refills: 0 | Status: SHIPPED | OUTPATIENT
Start: 2020-05-27 | End: 2020-07-27

## 2020-05-27 RX ORDER — ALPRAZOLAM 0.25 MG/1
TABLET ORAL
Qty: 15 TABLET | Refills: 2 | Status: SHIPPED | OUTPATIENT
Start: 2020-05-27 | End: 2020-08-24

## 2020-05-27 NOTE — TELEPHONE ENCOUNTER
Refill request for TRAZODONE 100MG. Last fill was 3/16/20 135tabs 0 refill. Refill request for ALPRAZOLAM 0.25MG. Last fill was 3/6/20 15 tabs 2 refills. Last OV was virtual phone E/M 5/4/20. No future OV.

## 2020-05-31 DIAGNOSIS — K21.9 GASTROESOPHAGEAL REFLUX DISEASE WITHOUT ESOPHAGITIS: ICD-10-CM

## 2020-06-01 RX ORDER — OMEPRAZOLE 20 MG/1
CAPSULE, DELAYED RELEASE ORAL
Qty: 90 CAPSULE | Refills: 1 | Status: SHIPPED | OUTPATIENT
Start: 2020-06-01 | End: 2020-12-14

## 2020-06-01 NOTE — TELEPHONE ENCOUNTER
Pt requesting refill of OMEPRAZOLE 20 MG Oral Capsule Delayed Release    Last Time Medication was Filled:  03/08/2020    Last Office Visit with Provider:  05/04/2020 (Virtual Phone E/M)    Appt scheduled on: No future appointments.

## 2020-06-08 RX ORDER — HYDROXYZINE HYDROCHLORIDE 25 MG/1
25 TABLET, FILM COATED ORAL NIGHTLY PRN
Qty: 90 TABLET | Refills: 0 | Status: SHIPPED | OUTPATIENT
Start: 2020-06-08 | End: 2020-09-14

## 2020-06-08 NOTE — TELEPHONE ENCOUNTER
Pt requesting refill of HYDROXYZINE HCL 25 MG Oral Tab    Last Time Medication was Filled: 03/16/2020    Last Office Visit with Provider:  05/04/2020 (Virtual Phone E/M)    Appt scheduled on: No future appointments.

## 2020-07-27 DIAGNOSIS — F41.9 INSOMNIA SECONDARY TO ANXIETY: ICD-10-CM

## 2020-07-27 DIAGNOSIS — F51.05 INSOMNIA SECONDARY TO ANXIETY: ICD-10-CM

## 2020-07-27 RX ORDER — TRAZODONE HYDROCHLORIDE 100 MG/1
150 TABLET ORAL NIGHTLY
Qty: 135 TABLET | Refills: 0 | Status: SHIPPED | OUTPATIENT
Start: 2020-07-27 | End: 2020-10-14

## 2020-07-27 RX ORDER — NABUMETONE 500 MG/1
500 TABLET, FILM COATED ORAL 2 TIMES DAILY PRN
Qty: 30 TABLET | Refills: 0 | Status: SHIPPED | OUTPATIENT
Start: 2020-07-27 | End: 2021-04-14

## 2020-07-27 NOTE — TELEPHONE ENCOUNTER
Pt requesting refill of TRAZODONE  MG Oral Tab     Last Time Medication was Filled:  5/27/20    Last Office Visit with Provider: 5/4/2020   Virtual Phone     No future appointments.

## 2020-07-27 NOTE — TELEPHONE ENCOUNTER
Pt requesting refill of NABUMETONE 500MG    Last Time Medication was Filled:  3/14/20    Last Office Visit with Provider: 5/4/2020   Virtual Phone     No future appointments.

## 2020-07-28 ENCOUNTER — PATIENT MESSAGE (OUTPATIENT)
Dept: FAMILY MEDICINE CLINIC | Facility: CLINIC | Age: 55
End: 2020-07-28

## 2020-07-28 NOTE — TELEPHONE ENCOUNTER
From: Nate Lamas  To: Nuria Jones PA-C  Sent: 7/28/2020 10:14 AM CDT  Subject: Prescription Question    Good Morning,  [facility]? I took my last one this morning.      Xin Cuevas

## 2020-08-24 DIAGNOSIS — F41.1 GENERALIZED ANXIETY DISORDER: ICD-10-CM

## 2020-08-24 NOTE — TELEPHONE ENCOUNTER
Pt requesting refill of   Requested Prescriptions     Pending Prescriptions Disp Refills   • ALPRAZOLAM 0.25 MG Oral Tab [Pharmacy Med Name: ALPRAZOLAM 0.25 MG TABLET] 15 tablet 0     Sig: TAKE 1/2 TO ONE TABLET ONCE A DAY AND TAPER DOWN AS TOLERATED

## 2020-08-25 RX ORDER — ALPRAZOLAM 0.25 MG/1
TABLET ORAL
Qty: 15 TABLET | Refills: 0 | Status: SHIPPED | OUTPATIENT
Start: 2020-08-25 | End: 2020-09-14

## 2020-09-03 ENCOUNTER — TELEPHONE (OUTPATIENT)
Dept: FAMILY MEDICINE CLINIC | Facility: CLINIC | Age: 55
End: 2020-09-03

## 2020-09-03 NOTE — PROGRESS NOTES
Virtual Telephone Check-In    Avani Aguilera verbally consents to a Virtual/Telephone Check-In visit on 09/03/20. Patient has been referred to the [facility] website at [email or web address] to review the yearly Consent to Treat document.     Patient Honey Mendoza she does feel more panicked and anxious when she does leave the house.   She physically otherwise is fine does get intermittent back pain and recently has noticed a mass in the left hand consistent with a ganglion cyst occasionally gets tingling in her left as directed by your physician. (Patient not taking: Reported on 9/2/2020 ) 1 Bottle 0   • folic acid 326 MCG Oral Tab Take 800 mcg by mouth daily. • Probiotic Product (PROBIOTIC PEARLS) Oral Cap Take 1 capsule by mouth daily.         Past Medical Histor headaches  Musculoskeletal: No motor deficits  Psych see above  EXAM:   No vitals taken due to tele-visit.   37 minutes time was spent reviewing patient's inquiry, patient's record including prior labs, developing management plan and ordering tests and pres

## 2020-09-04 NOTE — TELEPHONE ENCOUNTER
On 09/03/2020, the patient's completed \"Treating Source Statement - Physical Conditions\" form was successfully faxed to [employer] at [phone].

## 2020-09-05 ENCOUNTER — LAB ENCOUNTER (OUTPATIENT)
Dept: LAB | Age: 55
End: 2020-09-05
Attending: INTERNAL MEDICINE
Payer: COMMERCIAL

## 2020-09-05 DIAGNOSIS — Z01.818 PRE-OP TESTING: ICD-10-CM

## 2020-09-07 LAB — SARS-COV-2 RNA RESP QL NAA+PROBE: NOT DETECTED

## 2020-09-08 ENCOUNTER — TELEPHONE (OUTPATIENT)
Dept: FAMILY MEDICINE CLINIC | Facility: CLINIC | Age: 55
End: 2020-09-08

## 2020-09-10 ENCOUNTER — PATIENT MESSAGE (OUTPATIENT)
Dept: FAMILY MEDICINE CLINIC | Facility: CLINIC | Age: 55
End: 2020-09-10

## 2020-09-11 NOTE — TELEPHONE ENCOUNTER
From: Kristen Harman  To:  Leeann Escobar PA-C  Sent: 9/10/2020 5:04 PM CDT  Subject: Non-Urgent Medical Question    Tisha Iqra,  My colonoscopy went really well don’t need another one for 10 yrs, however she wants me to get a US Abdomen complete w/Dopp

## 2020-09-14 DIAGNOSIS — K57.92 DIVERTICULITIS: Primary | ICD-10-CM

## 2020-09-14 DIAGNOSIS — F41.1 GENERALIZED ANXIETY DISORDER: ICD-10-CM

## 2020-09-14 DIAGNOSIS — K21.9 GASTROESOPHAGEAL REFLUX DISEASE WITHOUT ESOPHAGITIS: ICD-10-CM

## 2020-09-14 RX ORDER — ALPRAZOLAM 0.25 MG/1
TABLET ORAL
Qty: 15 TABLET | Refills: 0 | Status: SHIPPED | OUTPATIENT
Start: 2020-09-14 | End: 2020-10-14

## 2020-09-14 RX ORDER — HYDROXYZINE HYDROCHLORIDE 25 MG/1
25 TABLET, FILM COATED ORAL NIGHTLY PRN
Qty: 90 TABLET | Refills: 0 | Status: SHIPPED | OUTPATIENT
Start: 2020-09-14 | End: 2020-12-14

## 2020-09-14 NOTE — TELEPHONE ENCOUNTER
Pt requesting refill of HYDROXYZINE HCL 25 MG Oral Tab and Alprazolam 0.25mg tab    Last Office Visit with Provider: 9/2/20 virtual    No future appointments. The patient is a 44y Male complaining of abdominal pain.

## 2020-09-16 ENCOUNTER — PATIENT MESSAGE (OUTPATIENT)
Dept: FAMILY MEDICINE CLINIC | Facility: CLINIC | Age: 55
End: 2020-09-16

## 2020-09-16 NOTE — TELEPHONE ENCOUNTER
From: Fitz Bourne  To: Trina Yee PA-C  Sent: 9/16/2020 3:55 PM CDT  Subject: Non-Urgent Medical Question    Allen Singleton,  What can I take when I get a headache? I use the Flonase for my sinuses.     Ronnie Valentino

## 2020-09-23 ENCOUNTER — PATIENT MESSAGE (OUTPATIENT)
Dept: FAMILY MEDICINE CLINIC | Facility: CLINIC | Age: 55
End: 2020-09-23

## 2020-09-23 NOTE — TELEPHONE ENCOUNTER
From: Nate Lamas  To: Nuria Jones PA-C  Sent: 9/23/2020 3:35 PM CDT  Subject: Other    Hi,  Please let Brindajose Ruizpolo know I got my flu shot and my 1st round of the shingle shot. Please update my chart.     Xin Cuevas

## 2020-10-05 ENCOUNTER — PATIENT MESSAGE (OUTPATIENT)
Dept: FAMILY MEDICINE CLINIC | Facility: CLINIC | Age: 55
End: 2020-10-05

## 2020-10-06 NOTE — TELEPHONE ENCOUNTER
From: Alida Check  To: Rolo Willson PA-C  Sent: 10/5/2020 6:58 PM CDT  Subject: Sarkis Villatoro had extremely dry lips since my colonoscopy.  I’ve used everything, Vaseline, chap stick etc. I went to the pharmacy and asked them and they said to ask a

## 2020-10-07 ENCOUNTER — PATIENT MESSAGE (OUTPATIENT)
Dept: FAMILY MEDICINE CLINIC | Facility: CLINIC | Age: 55
End: 2020-10-07

## 2020-10-07 ENCOUNTER — HOSPITAL ENCOUNTER (OUTPATIENT)
Dept: ULTRASOUND IMAGING | Age: 55
Discharge: HOME | End: 2020-10-07
Attending: INTERNAL MEDICINE
Payer: COMMERCIAL

## 2020-10-07 DIAGNOSIS — K31.9 GASTROPATHY: ICD-10-CM

## 2020-10-07 DIAGNOSIS — I85.00 ESOPHAGEAL VARICES WITHOUT BLEEDING, UNSPECIFIED ESOPHAGEAL VARICES TYPE (HCC): ICD-10-CM

## 2020-10-07 PROCEDURE — 76700 US EXAM ABDOM COMPLETE: CPT | Performed by: INTERNAL MEDICINE

## 2020-10-07 PROCEDURE — 93975 VASCULAR STUDY: CPT | Performed by: INTERNAL MEDICINE

## 2020-10-07 NOTE — TELEPHONE ENCOUNTER
From: Sandra Marti  To: Kathi Swanson PA-C  Sent: 10/7/2020 2:41 PM CDT  Subject: Test Results Question    Robert Hannon, I just got my ultra sound results and it show liver disease. I can’t talk to my GI doc about the results until next Wednesday.  Anna Ferrara

## 2020-10-14 DIAGNOSIS — F41.1 GENERALIZED ANXIETY DISORDER: ICD-10-CM

## 2020-10-14 DIAGNOSIS — F51.05 INSOMNIA SECONDARY TO ANXIETY: ICD-10-CM

## 2020-10-14 DIAGNOSIS — F41.9 INSOMNIA SECONDARY TO ANXIETY: ICD-10-CM

## 2020-10-14 RX ORDER — TRAZODONE HYDROCHLORIDE 100 MG/1
150 TABLET ORAL NIGHTLY
Qty: 135 TABLET | Refills: 0 | Status: SHIPPED | OUTPATIENT
Start: 2020-10-14 | End: 2020-12-28

## 2020-10-14 RX ORDER — ALPRAZOLAM 0.25 MG/1
TABLET ORAL
Qty: 15 TABLET | Refills: 0 | Status: SHIPPED | OUTPATIENT
Start: 2020-10-14 | End: 2020-11-13

## 2020-10-14 NOTE — TELEPHONE ENCOUNTER
Pt requesting refill of Alprazolam 0.25mg and Trazodone 100mg    Last Time Medication was Filled:  9/14/20 and 7/27/20    Last Office Visit with Provider: 9/2/20. The patient is asked to return for CPE or  as needed. No future appointments.

## 2020-10-21 NOTE — TELEPHONE ENCOUNTER
October 22, 2020       Corby Silver MD  [address]  [facility] [address]  Via Fax: [phone]      Patient: Tania Espitia   YOB: 1946   Date of Visit: 10/21/2020       Dear Dr. Silver:    Thank you for referring Tania Espitia to me for evaluation. Below are my notes for this visit with her.    If you have questions, please do not hesitate to call me. I look forward to following your patient along with you.      Sincerely,        Romain Zuleta MD        CC: No Recipients  Romain Zuleta MD  10/22/2020 12:25 PM  Signed  Cardiac consultation.    Referring Physician(s)  Corby Silver MD    Consultation (pt denies SOB,cp and dizziness today,.) and Office Visit        HPI   The patient is a 74 year old female here for initial cardiovascular evaluation.        Patient is known to have:  Atrial fibrillation on pradaxa  Hypertension  Dyslipidemia  DM  Diastolic dysfunction        Patient denies any shortness of breath , no orthopnea, PND.She does report some lower leg edema that is worse at the end of the day. She is maintained on lasix 20mg daily .  She noted that since she returned back from [facility] her blood pressure has been more elevated requiring the need to take losartan 25 mg daily .    US Carotid done in 2019 shows less than 50% stenosis most likely in the range of 16 to 49% in the bilateral internal carotid artery.    Last echocardiogram in January 2019 with an EF of 6065% with grade 1 diastolic dysfunction.    Nuclear 2015 basal inferior , mid inferior , apical related to diaphragmatic attenuation.      Total cholesterol 160  Triglyceride 105  LDL96  HDL 44  Carvedilol 25mg BID          Past Medical History:   Diagnosis Date   • Atrial fibrillation (CMS/HCC)    • Back pain    • CHF (congestive heart failure) (CMS/HCC)    • GERD (gastroesophageal reflux disease)    • H/O laminectomy    • HLD (hyperlipidemia)    • HTN (hypertension)    • Osteopenia     See if she needs to go to ER if she is unable to get the fever down, dehydration, dizziness, shortness of breath, any breathing issues? . Is that fever with Tyenol? Video TC today to review sxs if she does not think she needs ER.   Alternating tylenol an     Past Surgical History:   Procedure Laterality Date   • ----------mammogram----------  05/23/2019   • Colonoscopy  06/26/2015   • Knee surgery Bilateral     3, 5 years ago   • Spine surgery  2016     Family History   Problem Relation Age of Onset   • Heart disease Mother    • Hypertension Mother    • Stroke Mother    • Heart disease Father    • Stroke Father    • Hypertension Father      ALLERGIES:  No Known Allergies  Current Outpatient Medications   Medication Sig Dispense Refill   • metFORMIN (GLUCOPHAGE) 500 MG tablet Take 500 mg by mouth 3 times daily.      • sucralfate (CARAFATE) 1 g tablet TAKE 1 TABLET EVERY 6 HOURS     • dabigatran (PRADAXA) 150 MG Cap Take 150 mg by mouth every 12 hours.      • carvedilol (COREG) 25 MG tablet Take 25 mg by mouth 2 times daily (with meals).     • blood glucose test strip by Other route daily.     • traMADol (ULTRAM) 50 MG tablet Take 50 mg by mouth every 6 hours as needed.     • furosemide (LASIX) 20 MG tablet Take 20 mg by mouth daily.     • atorvastatin (LIPITOR) 40 MG tablet Take 40 mg by mouth daily.     • losartan (COZAAR) 50 MG tablet Take 25 mg by mouth daily.     • meclizine (ANTIVERT) 25 MG tablet Take 25 mg by mouth 2 times daily as needed.     • DENOSumab (PROLIA) 60 MG/ML Solution Prefilled Syringe Inject 60 mg into the skin every 6 months.        No current facility-administered medications for this visit.      Social History     Tobacco Use   • Smoking status: Never Smoker   • Smokeless tobacco: Never Used   Substance Use Topics   • Alcohol use: Never     Frequency: Never   • Drug use: Never         Review of Systems   Constitution: Negative.   HENT: Negative.    Eyes: Negative.    Endocrine: Negative.    Hematologic/Lymphatic: Negative.    Skin: Negative.    Musculoskeletal: Positive for arthritis, back pain and joint pain.   Gastrointestinal: Negative.    Genitourinary: Negative.    Neurological: Negative.    Psychiatric/Behavioral: Negative.     Allergic/Immunologic: Negative.        Visit Vitals  BP (!) 150/70 (BP Location: RUE - Right upper extremity, Patient Position: Standing, Cuff Size: Regular)   Pulse 86   Temp 97.2 °F (36.2 °C) (Temporal)   Ht 5' 3\" (1.6 m)   Wt 108.4 kg (239 lb)   SpO2 91%   BMI 42.34 kg/m²       Physical Exam   Constitutional: She appears well-developed and well-nourished.   Eyes: Pupils are equal, round, and reactive to light. Conjunctivae and EOM are normal.   Neck: No JVD present. No thyromegaly present.   Cardiovascular: Normal rate, regular rhythm and intact distal pulses.  No extrasystoles are present. Exam reveals no gallop, no S3, no S4 and no friction rub.   No murmur heard.  Pulses:       Carotid pulses are 2+ on the right side and 2+ on the left side.       Radial pulses are 2+ on the right side and 2+ on the left side.        Femoral pulses are 2+ on the right side and 2+ on the left side.       Popliteal pulses are 2+ on the right side and 2+ on the left side.        Dorsalis pedis pulses are 2+ on the right side and 2+ on the left side.        Posterior tibial pulses are 2+ on the right side and 2+ on the left side.   Pulmonary/Chest: Effort normal and breath sounds normal. No respiratory distress. She has no wheezes. She has no rales. She exhibits no tenderness.   Abdominal: Soft. Bowel sounds are normal. She exhibits no distension and no mass. There is no abdominal tenderness.   Musculoskeletal: Normal range of motion.         General: No edema.   Neurological: Coordination normal.   Skin: No rash noted. No pallor.   Psychiatric: She has a normal mood and affect. Her behavior is normal.       Laboratory Data        Results for orders placed or performed in visit on 10/21/20   ELECTROCARDIOGRAM 12-LEAD    Impression    Normal sinus rhythm at 84/min, normal EKG.           ASSESSMENT/PLAN  Problem List Items Addressed This Visit        Circulatory    HTN (hypertension)    Relevant Medications    dabigatran (PRADAXA)  150 MG Cap    carvedilol (COREG) 25 MG tablet    furosemide (LASIX) 20 MG tablet    atorvastatin (LIPITOR) 40 MG tablet    losartan (COZAAR) 50 MG tablet    Unspecified atrial fibrillation (CMS/HCC)    Relevant Medications    dabigatran (PRADAXA) 150 MG Cap    carvedilol (COREG) 25 MG tablet    furosemide (LASIX) 20 MG tablet    atorvastatin (LIPITOR) 40 MG tablet    losartan (COZAAR) 50 MG tablet    Atherosclerotic heart disease of native coronary artery without angina pectoris    Relevant Medications    dabigatran (PRADAXA) 150 MG Cap    carvedilol (COREG) 25 MG tablet    furosemide (LASIX) 20 MG tablet    atorvastatin (LIPITOR) 40 MG tablet    losartan (COZAAR) 50 MG tablet    Heart failure, unspecified (CMS/HCC)    Relevant Medications    dabigatran (PRADAXA) 150 MG Cap    carvedilol (COREG) 25 MG tablet    furosemide (LASIX) 20 MG tablet    atorvastatin (LIPITOR) 40 MG tablet    losartan (COZAAR) 50 MG tablet      Other Visit Diagnoses     Coronary artery disease without angina pectoris, unspecified vessel or lesion type, unspecified whether native or transplanted heart    -  Primary    Relevant Medications    dabigatran (PRADAXA) 150 MG Cap    carvedilol (COREG) 25 MG tablet    furosemide (LASIX) 20 MG tablet    atorvastatin (LIPITOR) 40 MG tablet    losartan (COZAAR) 50 MG tablet    Other Relevant Orders    ELECTROCARDIOGRAM 12-LEAD (Completed)    ELECTROCARDIOGRAM 12-LEAD (Completed)         1-Chronic diastolic heart failure  Patient seems compensated   On lasix 20mg daily will continue the same  Advice of low salt intake    2-HTN  Blood pressure seems elevated in clinic today   She is maintained on corge 25mg BID   In the presence of diabetes patient was advised to take losartan 25mg daily   If blood pressure still elevated than will increase to 50mg daily       3-DL  On atorvastatin 40mg daily   Last LDL in 2017 was 64 Will check again this year with her PCP     4-Atrial fibrillation   Paroxysmal On  pradaxa 150mg BID  Patient currently seems to be in normal sinus rhythm   Will continue carvedilol 25mg BID.    Return in 6 months (on 4/21/2021).      Kayla Cagle  PGY6  Cardiology fellow            Cardiology Attending Addendum      The patient was seen and examined with Dr. Cagle.  We discussed the clinical findings and plan of care.  I agree with the assessment and recommendations:      1.  I reassured the patient that her exam today shows no evidence of volume overload or decompensated CHF.  2.  Continue furosemide 20 mg daily.  3.  BP is not fully controlled, we recommended to the patient to take losartan 25 mg daily rather than as needed.  4.  Continue carvedilol 25 mg twice daily.  5.  I reassured the patient that her cardiac exam and EKG today confirm NSR.  6.  Continue anticoagulation with Pradaxa 150 mg twice daily.  7.  Follow-up visit in 6 months.      RELL Zuleta MD

## 2020-11-13 DIAGNOSIS — F41.1 GENERALIZED ANXIETY DISORDER: ICD-10-CM

## 2020-11-13 RX ORDER — ALPRAZOLAM 0.25 MG/1
TABLET ORAL
Qty: 15 TABLET | Refills: 0 | Status: SHIPPED | OUTPATIENT
Start: 2020-11-13 | End: 2020-12-14

## 2020-12-11 DIAGNOSIS — F41.1 GENERALIZED ANXIETY DISORDER: ICD-10-CM

## 2020-12-14 ENCOUNTER — PATIENT MESSAGE (OUTPATIENT)
Dept: FAMILY MEDICINE CLINIC | Facility: CLINIC | Age: 55
End: 2020-12-14

## 2020-12-14 DIAGNOSIS — F51.05 INSOMNIA SECONDARY TO ANXIETY: ICD-10-CM

## 2020-12-14 DIAGNOSIS — F41.9 INSOMNIA SECONDARY TO ANXIETY: ICD-10-CM

## 2020-12-14 DIAGNOSIS — K21.9 GASTROESOPHAGEAL REFLUX DISEASE WITHOUT ESOPHAGITIS: ICD-10-CM

## 2020-12-14 RX ORDER — OMEPRAZOLE 20 MG/1
20 CAPSULE, DELAYED RELEASE ORAL
Qty: 90 CAPSULE | Refills: 0 | Status: SHIPPED | OUTPATIENT
Start: 2020-12-14 | End: 2021-03-09

## 2020-12-14 RX ORDER — HYDROXYZINE HYDROCHLORIDE 25 MG/1
25 TABLET, FILM COATED ORAL NIGHTLY PRN
Qty: 90 TABLET | Refills: 0 | Status: SHIPPED | OUTPATIENT
Start: 2020-12-14 | End: 2021-03-09

## 2020-12-14 RX ORDER — ALPRAZOLAM 0.25 MG/1
TABLET ORAL
Qty: 15 TABLET | Refills: 0 | Status: SHIPPED | OUTPATIENT
Start: 2020-12-14 | End: 2021-01-13

## 2020-12-14 RX ORDER — TRAZODONE HYDROCHLORIDE 100 MG/1
150 TABLET ORAL NIGHTLY
Qty: 135 TABLET | Refills: 0 | OUTPATIENT
Start: 2020-12-14

## 2020-12-14 RX ORDER — TRAZODONE HYDROCHLORIDE 100 MG/1
150 TABLET ORAL NIGHTLY
Qty: 135 TABLET | Refills: 0 | Status: CANCELLED | OUTPATIENT
Start: 2020-12-14

## 2020-12-14 NOTE — TELEPHONE ENCOUNTER
From: Gladis More  To: Marcy Willis PA-C  Sent: 12/14/2020 9:24 AM CST  Subject: Prescription Question    I’m waiting on refill approvals. [facility] sent them to you and I just called them and they are waiting for a response from the doctor.  They sent th

## 2020-12-14 NOTE — TELEPHONE ENCOUNTER
From: Lobo Melara  To: Gordo Barger PA-C  Sent: 12/14/2020 10:51 AM CST  Subject: Prescription Question    I would also like to know if Vero Beltre would like me to take a vitamin for liver health.  What does she recommend    [facility]

## 2020-12-14 NOTE — TELEPHONE ENCOUNTER
From: Johanna Carreon  To:  Darwin Aguilar PA-C  Sent: 12/14/2020 9:33 AM CST  Subject: Prescription Question    [facility]

## 2020-12-14 NOTE — TELEPHONE ENCOUNTER
Alprazolam 10/15/2020 10/14/2020  15 tablet  15     HYDROXYZINE HCL 25 MG TABLET 09/14/2020 09/14/2020  90 each  90     9/2/2020  Virtual Phone   No follow up appointment

## 2020-12-17 ENCOUNTER — PATIENT MESSAGE (OUTPATIENT)
Dept: FAMILY MEDICINE CLINIC | Facility: CLINIC | Age: 55
End: 2020-12-17

## 2020-12-17 NOTE — TELEPHONE ENCOUNTER
From: Abelino Matters  To:  Adina Rojas PA-C  Sent: 12/17/2020 9:03 AM CST  Subject: Other    I just wanted to let RAVINDRA know my  and I got our 2nd shingle shot yesterday     Gabby Chaidez [birth year]  Jacob Moreno [birth year]

## 2020-12-28 ENCOUNTER — PATIENT MESSAGE (OUTPATIENT)
Dept: FAMILY MEDICINE CLINIC | Facility: CLINIC | Age: 55
End: 2020-12-28

## 2020-12-28 DIAGNOSIS — F51.05 INSOMNIA SECONDARY TO ANXIETY: ICD-10-CM

## 2020-12-28 DIAGNOSIS — F41.9 INSOMNIA SECONDARY TO ANXIETY: ICD-10-CM

## 2020-12-28 RX ORDER — TRAZODONE HYDROCHLORIDE 100 MG/1
150 TABLET ORAL NIGHTLY
Qty: 135 TABLET | Refills: 0 | Status: SHIPPED | OUTPATIENT
Start: 2020-12-28 | End: 2021-03-09

## 2020-12-28 NOTE — TELEPHONE ENCOUNTER
From: Gladis Tam  To: Marcy Willis PA-C  Sent: 12/28/2020 2:20 PM CST  Subject: Prescription Question    Hi! I’m out of Trazadone. I haven’t slept in 2 days.  When can I get a refill

## 2020-12-28 NOTE — TELEPHONE ENCOUNTER
Last fill was 10/14/20 135 tabs. Patient states sometimes she takes 2. Last OV 9/2/20  No future OV       5. Insomnia secondary to anxiety  Trazodone 150 mg at night with hydroxyzine 25 mg at night.   Sleep hygiene discussed     The patient indicates under

## 2021-01-11 ENCOUNTER — PATIENT MESSAGE (OUTPATIENT)
Dept: FAMILY MEDICINE CLINIC | Facility: CLINIC | Age: 56
End: 2021-01-11

## 2021-01-11 NOTE — TELEPHONE ENCOUNTER
From: Rizwan Alvarez  To: Scott Landry PA-C  Sent: 1/11/2021 9:20 AM CST  Subject: Non-Urgent Medical Question    Hi Jaqui Child   As you know I started a fitness program and work out a lot. My knee, shoulders and ankles are hurting.  With my fatty liver

## 2021-01-13 DIAGNOSIS — F41.1 GENERALIZED ANXIETY DISORDER: ICD-10-CM

## 2021-01-13 RX ORDER — ALPRAZOLAM 0.25 MG/1
TABLET ORAL
Qty: 15 TABLET | Refills: 0 | Status: SHIPPED | OUTPATIENT
Start: 2021-01-13 | End: 2021-02-12

## 2021-01-13 NOTE — TELEPHONE ENCOUNTER
Requested Prescriptions     Pending Prescriptions Disp Refills   • ALPRAZOLAM 0.25 MG Oral Tab [Pharmacy Med Name: ALPRAZOLAM 0.25 MG TABLET] 15 tablet 0     Sig: TAKE 1/2 TO ONE TABLET BY MOUTH ONCE A DAY AND TAPER DOWN AS TOLERATED     Last fill was 12/1

## 2021-02-12 DIAGNOSIS — F41.1 GENERALIZED ANXIETY DISORDER: ICD-10-CM

## 2021-02-12 RX ORDER — ALPRAZOLAM 0.25 MG/1
TABLET ORAL
Qty: 15 TABLET | Refills: 0 | Status: SHIPPED | OUTPATIENT
Start: 2021-02-12 | End: 2021-04-14

## 2021-02-12 NOTE — TELEPHONE ENCOUNTER
Requested Prescriptions     Pending Prescriptions Disp Refills   • ALPRAZOLAM 0.25 MG Oral Tab [Pharmacy Med Name: ALPRAZOLAM 0.25 MG TABLET] 15 tablet 0     Sig: TAKE 1/2 TO ONE TABLET BY MOUTH ONCE A DAY AND TAPER DOWN AS TOLERATED     Last fill was 1/13

## 2021-03-02 ENCOUNTER — PATIENT MESSAGE (OUTPATIENT)
Dept: FAMILY MEDICINE CLINIC | Facility: CLINIC | Age: 56
End: 2021-03-02

## 2021-03-03 NOTE — TELEPHONE ENCOUNTER
From: Gladis Tam  To: Marcy Willis PA-C  Sent: 3/2/2021 5:29 PM CST  Subject: Other    Hi Lacie Scheuermann   When can we get our COVID-19 vaccine! 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 We will traveling soon.      Please advi

## 2021-03-09 ENCOUNTER — PATIENT MESSAGE (OUTPATIENT)
Dept: FAMILY MEDICINE CLINIC | Facility: CLINIC | Age: 56
End: 2021-03-09

## 2021-03-09 DIAGNOSIS — F41.9 INSOMNIA SECONDARY TO ANXIETY: ICD-10-CM

## 2021-03-09 DIAGNOSIS — F51.05 INSOMNIA SECONDARY TO ANXIETY: ICD-10-CM

## 2021-03-09 RX ORDER — TRAZODONE HYDROCHLORIDE 100 MG/1
200 TABLET ORAL NIGHTLY
Qty: 60 TABLET | Refills: 0 | Status: SHIPPED | OUTPATIENT
Start: 2021-03-09 | End: 2021-04-03

## 2021-03-09 NOTE — TELEPHONE ENCOUNTER
From: Rey San  To: Arely Apple PA-C  Sent: 3/9/2021 8:40 AM CST  Subject: Prescription Question    Thx u for refilling my 2 prescriptions. I will also need a refill for my Trazadone.  The prescription is for 1 and a half at night, Holland Ran been ranjan

## 2021-03-09 NOTE — TELEPHONE ENCOUNTER
HYDROXYZINE HCL 25 MG Oral Tab 90 tablet 0 12/14/2020     omeprazole 20 MG Oral Capsule Delayed Release 90 capsule 0 12/14/2020     LOV 9/2/20.  The patient is asked to return for CPE or  as needed

## 2021-03-09 NOTE — TELEPHONE ENCOUNTER
Patient needs complete physical 30 days only provided       Patient needs to follow-up for complete physical is overdue for mammogram.  #30 sent to pharmacy. Patient aware she needs appointment.

## 2021-03-10 ENCOUNTER — PATIENT MESSAGE (OUTPATIENT)
Dept: FAMILY MEDICINE CLINIC | Facility: CLINIC | Age: 56
End: 2021-03-10

## 2021-03-10 DIAGNOSIS — F51.05 INSOMNIA SECONDARY TO ANXIETY: ICD-10-CM

## 2021-03-10 DIAGNOSIS — F41.9 INSOMNIA SECONDARY TO ANXIETY: ICD-10-CM

## 2021-03-10 RX ORDER — TRAZODONE HYDROCHLORIDE 100 MG/1
200 TABLET ORAL NIGHTLY
Qty: 60 TABLET | Refills: 0 | OUTPATIENT
Start: 2021-03-10

## 2021-03-10 NOTE — TELEPHONE ENCOUNTER
Requested Prescriptions     Refused Prescriptions Disp Refills   • traZODone HCl 100 MG Oral Tab 60 tablet 0     Sig: Take 2 tablets (200 mg total) by mouth nightly.      Refused By: Vida Darnell     Reason for Refusal: Patient has requested refill too jai

## 2021-03-11 NOTE — TELEPHONE ENCOUNTER
From: Rudy Rg  To: Chin Cool PA-C  Sent: 3/10/2021 5:23 PM CST  Subject: Prescription Question    Caren Rodrigues already sent a message! Beverly Yoo said I could take 2 a night. What the problem.     Franca Rodriguez

## 2021-03-15 DIAGNOSIS — F41.1 GENERALIZED ANXIETY DISORDER: ICD-10-CM

## 2021-03-15 RX ORDER — ALPRAZOLAM 0.25 MG/1
TABLET ORAL
Qty: 15 TABLET | Refills: 0 | OUTPATIENT
Start: 2021-03-15

## 2021-03-15 NOTE — TELEPHONE ENCOUNTER
ALPRAZOLAM 0.25 MG Oral Tab 15 tablet 0 2/12/2021     LOV 9/2/20  No future OV     Rx denied  Mychart message sent

## 2021-03-28 ENCOUNTER — PATIENT MESSAGE (OUTPATIENT)
Dept: FAMILY MEDICINE CLINIC | Facility: CLINIC | Age: 56
End: 2021-03-28

## 2021-03-29 NOTE — TELEPHONE ENCOUNTER
From: Matt Coleman  To: Bertis Litten, PA-C  Sent: 3/28/2021 3:51 PM CDT  Subject: Non-Urgent Medical Question    When can I schedule my Covid-19 vacations? 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

## 2021-04-02 DIAGNOSIS — F41.9 INSOMNIA SECONDARY TO ANXIETY: ICD-10-CM

## 2021-04-02 DIAGNOSIS — F51.05 INSOMNIA SECONDARY TO ANXIETY: ICD-10-CM

## 2021-04-02 DIAGNOSIS — F41.1 GENERALIZED ANXIETY DISORDER: ICD-10-CM

## 2021-04-02 DIAGNOSIS — K21.9 GASTROESOPHAGEAL REFLUX DISEASE WITHOUT ESOPHAGITIS: ICD-10-CM

## 2021-04-02 RX ORDER — OMEPRAZOLE 20 MG/1
CAPSULE, DELAYED RELEASE ORAL
Qty: 30 CAPSULE | Refills: 0 | OUTPATIENT
Start: 2021-04-02

## 2021-04-02 RX ORDER — TRAZODONE HYDROCHLORIDE 100 MG/1
TABLET ORAL
Qty: 60 TABLET | Refills: 0 | OUTPATIENT
Start: 2021-04-02

## 2021-04-02 RX ORDER — HYDROXYZINE HYDROCHLORIDE 25 MG/1
25 TABLET, FILM COATED ORAL NIGHTLY PRN
Qty: 30 TABLET | Refills: 0 | OUTPATIENT
Start: 2021-04-02

## 2021-04-02 NOTE — TELEPHONE ENCOUNTER
Refill requests for hydroxyzine, omeprazole, and trazodone. Last filled for 1 month 3/9/21.   Does have future OV 4/14/21

## 2021-04-03 RX ORDER — TRAZODONE HYDROCHLORIDE 100 MG/1
200 TABLET ORAL NIGHTLY
Qty: 60 TABLET | Refills: 0 | Status: SHIPPED | OUTPATIENT
Start: 2021-04-03 | End: 2021-04-30

## 2021-04-03 RX ORDER — OMEPRAZOLE 20 MG/1
20 CAPSULE, DELAYED RELEASE ORAL
Qty: 30 CAPSULE | Refills: 0 | Status: SHIPPED | OUTPATIENT
Start: 2021-04-03 | End: 2021-04-30

## 2021-04-03 RX ORDER — HYDROXYZINE HYDROCHLORIDE 25 MG/1
25 TABLET, FILM COATED ORAL NIGHTLY PRN
Qty: 30 TABLET | Refills: 0 | Status: SHIPPED | OUTPATIENT
Start: 2021-04-03 | End: 2021-04-14

## 2021-04-05 ENCOUNTER — PATIENT MESSAGE (OUTPATIENT)
Dept: FAMILY MEDICINE CLINIC | Facility: CLINIC | Age: 56
End: 2021-04-05

## 2021-04-05 NOTE — TELEPHONE ENCOUNTER
From: Johanna Carreon  To: Darwin Aguilar PA-C  Sent: 4/5/2021 8:41 AM CDT  Subject: Prescription Question    Arelia Fu my prescriptions approved? I see on my chart they were and then denied. I already scheduled my appointment with Lizzeth Lan.     Shakila Lazcano

## 2021-04-14 ENCOUNTER — PATIENT MESSAGE (OUTPATIENT)
Dept: FAMILY MEDICINE CLINIC | Facility: CLINIC | Age: 56
End: 2021-04-14

## 2021-04-14 RX ORDER — BUSPIRONE HYDROCHLORIDE 5 MG/1
TABLET ORAL EVERY MORNING
Qty: 30 TABLET | Refills: 1 | Status: SHIPPED | OUTPATIENT
Start: 2021-04-14 | End: 2021-12-27

## 2021-04-14 NOTE — TELEPHONE ENCOUNTER
From: Abelino Marvin  To: Adina Rojas PA-C  Sent: 4/14/2021 3:22 PM CDT  Subject: Non-Urgent Medical Question    Hi  I had a video conversation with Gama Pedraza today and I’m confused about Hydroxzine.  I was already on it for night time and have questio

## 2021-04-14 NOTE — TELEPHONE ENCOUNTER
hydrOXYzine HCl 10 MG Oral Tab 60 tablet 2 4/14/2021    Sig:   Take 1-2 tablets (10-20 mg total) by mouth every 8 (eight) hours as needed for Anxiety.

## 2021-04-14 NOTE — PATIENT INSTRUCTIONS
Supplement suggestions for energy/anxiety/insomnia. Methyl folate 400 mcg and methylcobalamin (B12) 1,000 mcg. Vitamin D3 @ 5,000 international units, magnesium glycinate 400 mg and ashwagandha.     GERD prevention reviewed avoid caffeine, alcohol, and no

## 2021-04-15 NOTE — PROGRESS NOTES
Fatoumata Caudra is a 54year old female. HPI:   Please note that the following visit was completed using two-way, real-time interactive audio and video communication.   This has been done in good dg to provide continuity of care in the best interest of t wanting to get the testing done secondary to financial cost.  Presently is not on antidepressants states that the mood is more situational based on anxiety.   Is overdue for complete physical and mammogram.  PHQ9 FLOWSHEET 4/14/2021   Little interest or ple Diarrhea, unspecified 2017   • Diverticulosis of large intestine    • Dizziness 2019   • Dizziness and giddiness 4/22/11   • Esophageal reflux    • Esophagitis 11/14/2013    LA Grade B esophagitis   • Fatigue Occasionally   • Flatulence/gas pain/belching U anxious affect is blunted good communication skills are good     ASSESSMENT AND PLAN:   Generalized anxiety disorder  (primary encounter diagnosis)  Insomnia secondary to anxiety  Alcoholic (hcc)  Tobacco abuse  Transaminitis  Fatty liver  Gastroesophageal frequent meals and avoid eating 3-4 hours before bedtime, try to raise the head of bed. Rock Organic Raw Apple Cider Vinegar with the mother 2 or 3 teaspoons (10-15 ml) in an 8 ounce glass of water, before meals or whenever heartburn is experienced.   Unf

## 2021-04-30 DIAGNOSIS — F41.9 INSOMNIA SECONDARY TO ANXIETY: ICD-10-CM

## 2021-04-30 DIAGNOSIS — F41.1 GENERALIZED ANXIETY DISORDER: ICD-10-CM

## 2021-04-30 DIAGNOSIS — K21.9 GASTROESOPHAGEAL REFLUX DISEASE WITHOUT ESOPHAGITIS: ICD-10-CM

## 2021-04-30 DIAGNOSIS — F51.05 INSOMNIA SECONDARY TO ANXIETY: ICD-10-CM

## 2021-04-30 RX ORDER — TRAZODONE HYDROCHLORIDE 100 MG/1
TABLET ORAL
Qty: 60 TABLET | Refills: 2 | Status: SHIPPED | OUTPATIENT
Start: 2021-04-30 | End: 2021-07-21

## 2021-04-30 RX ORDER — OMEPRAZOLE 20 MG/1
CAPSULE, DELAYED RELEASE ORAL
Qty: 30 CAPSULE | Refills: 0 | Status: SHIPPED | OUTPATIENT
Start: 2021-04-30 | End: 2021-05-03

## 2021-04-30 RX ORDER — HYDROXYZINE HYDROCHLORIDE 25 MG/1
25 TABLET, FILM COATED ORAL NIGHTLY PRN
Qty: 30 TABLET | Refills: 0 | OUTPATIENT
Start: 2021-04-30

## 2021-04-30 NOTE — TELEPHONE ENCOUNTER
Requested Prescriptions     Pending Prescriptions Disp Refills   • OMEPRAZOLE 20 MG Oral Capsule Delayed Release [Pharmacy Med Name: OMEPRAZOLE DR 20 MG CAPSULE] 30 capsule 0     Sig: TAKE 1 CAPSULE BY MOUTH EVERY DAY IN THE MORNING BEFORE BREAKFAST     Re

## 2021-04-30 NOTE — TELEPHONE ENCOUNTER
Requested Prescriptions     Pending Prescriptions Disp Refills   • TRAZODONE  MG Oral Tab [Pharmacy Med Name: TRAZODONE 100 MG TABLET] 60 tablet 0     Sig: TAKE 2 TABLETS BY MOUTH NIGHTLY     Last fill was 4/3/21 60 tabs 0 refill  Last OV 4/14/21  N

## 2021-05-03 RX ORDER — OMEPRAZOLE 20 MG/1
20 CAPSULE, DELAYED RELEASE ORAL
Qty: 90 CAPSULE | Refills: 0 | Status: SHIPPED | OUTPATIENT
Start: 2021-05-03 | End: 2021-07-21

## 2021-05-03 NOTE — TELEPHONE ENCOUNTER
Per [facility] insurance will only cover 90 day supply: Omeprazole     Script re-sent as 90 days.

## 2021-06-25 ENCOUNTER — PATIENT MESSAGE (OUTPATIENT)
Dept: FAMILY MEDICINE CLINIC | Facility: CLINIC | Age: 56
End: 2021-06-25

## 2021-06-25 DIAGNOSIS — Z12.31 ENCOUNTER FOR SCREENING MAMMOGRAM FOR MALIGNANT NEOPLASM OF BREAST: ICD-10-CM

## 2021-06-25 DIAGNOSIS — R21 RASH OF UNKNOWN CAUSE: Primary | ICD-10-CM

## 2021-06-25 DIAGNOSIS — R23.4 BREAST SKIN CHANGES: ICD-10-CM

## 2021-06-28 ENCOUNTER — PATIENT MESSAGE (OUTPATIENT)
Dept: FAMILY MEDICINE CLINIC | Facility: CLINIC | Age: 56
End: 2021-06-28

## 2021-06-28 NOTE — TELEPHONE ENCOUNTER
From: Lacie Record  To: Marie Chaudhary PA-C  Sent: 6/28/2021 4:49 PM CDT  Subject: Non-Urgent Medical Question    Betzaida Fajardo   I sent a message to you regarding a red, itchy rash on my breast for over a month now.  Gabino Morales been putting on Aveeno 1% hydro

## 2021-06-29 RX ORDER — NYSTATIN 100000 U/G
CREAM TOPICAL
Qty: 30 G | Refills: 0 | Status: SHIPPED | OUTPATIENT
Start: 2021-06-29 | End: 2021-10-09 | Stop reason: ALTCHOICE

## 2021-06-29 NOTE — TELEPHONE ENCOUNTER
Change the mammogram to a 2D 3D tomography diagnostic however schedule soon as possible. .  Send a prescription over for nystatin cream 30 g thin film twice a day for 10 days. If it does persist I may need to have her see Dr. Darby Noriega.
From: Sally Salmeron  To: Linda Hernandez PA-C  Sent: 6/25/2021 6:55 PM CDT  Subject: Non-Urgent Medical Question    I also forgot to tell you Sundeep Dunlap had a itchy rash on my breasts for a month now.  Sundeep Dunlap been putting Aveeno 1% hydrocortisone on it but it st
Orders placed and fwd to provider for verification.
sharp

## 2021-07-21 DIAGNOSIS — K21.9 GASTROESOPHAGEAL REFLUX DISEASE WITHOUT ESOPHAGITIS: ICD-10-CM

## 2021-07-21 DIAGNOSIS — F41.9 INSOMNIA SECONDARY TO ANXIETY: ICD-10-CM

## 2021-07-21 DIAGNOSIS — F41.1 GENERALIZED ANXIETY DISORDER: ICD-10-CM

## 2021-07-21 DIAGNOSIS — F51.05 INSOMNIA SECONDARY TO ANXIETY: ICD-10-CM

## 2021-07-21 RX ORDER — HYDROXYZINE HYDROCHLORIDE 25 MG/1
25 TABLET, FILM COATED ORAL NIGHTLY PRN
Qty: 30 TABLET | Refills: 0 | OUTPATIENT
Start: 2021-07-21

## 2021-07-21 RX ORDER — OMEPRAZOLE 20 MG/1
CAPSULE, DELAYED RELEASE ORAL
Qty: 90 CAPSULE | Refills: 0 | Status: SHIPPED | OUTPATIENT
Start: 2021-07-21 | End: 2021-10-18

## 2021-07-21 RX ORDER — TRAZODONE HYDROCHLORIDE 100 MG/1
TABLET ORAL
Qty: 180 TABLET | Refills: 0 | Status: SHIPPED | OUTPATIENT
Start: 2021-07-21 | End: 2021-10-21

## 2021-07-21 NOTE — TELEPHONE ENCOUNTER
Requested Prescriptions     Pending Prescriptions Disp Refills   • OMEPRAZOLE 20 MG Oral Capsule Delayed Release [Pharmacy Med Name: OMEPRAZOLE DR 20 MG CAPSULE] 90 capsule 0     Sig: TAKE 1 CAPSULE BY MOUTH BEFORE BREAKFAST EVERY DAY   • TRAZODONE

## 2021-09-22 ENCOUNTER — PATIENT MESSAGE (OUTPATIENT)
Dept: FAMILY MEDICINE CLINIC | Facility: CLINIC | Age: 56
End: 2021-09-22

## 2021-09-22 DIAGNOSIS — F41.1 GENERALIZED ANXIETY DISORDER: ICD-10-CM

## 2021-09-22 RX ORDER — HYDROXYZINE HYDROCHLORIDE 10 MG/1
TABLET, FILM COATED ORAL EVERY 8 HOURS PRN
Qty: 60 TABLET | Refills: 2 | Status: SHIPPED | OUTPATIENT
Start: 2021-09-22 | End: 2021-12-14

## 2021-09-22 RX ORDER — HYDROXYZINE HYDROCHLORIDE 25 MG/1
25 TABLET, FILM COATED ORAL NIGHTLY PRN
Qty: 30 TABLET | Refills: 0 | OUTPATIENT
Start: 2021-09-22

## 2021-09-22 NOTE — TELEPHONE ENCOUNTER
Keena Klein is calling to see why it is not appropriate for her refill on her Hydroxyzine, she has been taking them 1x at night and she still has 1 refill let and she cannot get it.  Please call Keena Klein at [phone]

## 2021-09-22 NOTE — TELEPHONE ENCOUNTER
From: Rey San  To:  Arely Apple PA-C  Sent: 9/22/2021 2:08 PM CDT  Subject: Prescription question     [facility] to [facility] for Hydro[facility]ine and they said you rejected the request. I called the office and they said they would

## 2021-09-22 NOTE — TELEPHONE ENCOUNTER
Requested Prescriptions     Refused Prescriptions Disp Refills   • HYDROXYZINE 25 MG Oral Tab [Pharmacy Med Name: HYDROXYZINE HCL 25 MG TABLET] 30 tablet 0     Sig: TAKE 1 TABLET (25 MG TOTAL) BY MOUTH NIGHTLY AS NEEDED.      Refused By: Lane Fisher

## 2021-10-06 ENCOUNTER — PATIENT MESSAGE (OUTPATIENT)
Dept: FAMILY MEDICINE CLINIC | Facility: CLINIC | Age: 56
End: 2021-10-06

## 2021-10-06 RX ORDER — LOSARTAN POTASSIUM 25 MG/1
25 TABLET ORAL DAILY
Qty: 30 TABLET | Refills: 0 | Status: SHIPPED | OUTPATIENT
Start: 2021-10-06 | End: 2021-10-28

## 2021-10-06 NOTE — TELEPHONE ENCOUNTER
Ask stroke questions if considered then to ER. Appointment tomorrow if she sounds ok.   Losartan 25 mg start today

## 2021-10-06 NOTE — TELEPHONE ENCOUNTER
Spoke with patient, denies chest pain, slurred speech, jaw/arm pain, N/T face, SOB. C/o occasional sinus headaches and reports confusion is not often, states she forgets where she puts things once or twice a day.      Relayed recommendations and appointme

## 2021-10-06 NOTE — TELEPHONE ENCOUNTER
From: Jalyn Sullivan  To: Vidya Callejas PA-C  Sent: 10/6/2021 10:56 AM CDT  Subject: Blood pressure     Hi Saniya Cast all is well. João Nice been dizzy the last few days so I decided to check my blood pressure. I took it last night and this morning.  I

## 2021-10-07 ENCOUNTER — TELEPHONE (OUTPATIENT)
Dept: FAMILY MEDICINE CLINIC | Facility: CLINIC | Age: 56
End: 2021-10-07

## 2021-10-09 ENCOUNTER — OFFICE VISIT (OUTPATIENT)
Dept: FAMILY MEDICINE CLINIC | Facility: CLINIC | Age: 56
End: 2021-10-09
Payer: COMMERCIAL

## 2021-10-09 VITALS
OXYGEN SATURATION: 98 % | BODY MASS INDEX: 28.34 KG/M2 | HEIGHT: 64 IN | HEART RATE: 101 BPM | SYSTOLIC BLOOD PRESSURE: 122 MMHG | DIASTOLIC BLOOD PRESSURE: 82 MMHG | WEIGHT: 166 LBS

## 2021-10-09 DIAGNOSIS — I10 PRIMARY HYPERTENSION: Primary | ICD-10-CM

## 2021-10-09 DIAGNOSIS — R73.9 HYPERGLYCEMIA: ICD-10-CM

## 2021-10-09 DIAGNOSIS — Z83.3 FAMILY HISTORY OF DIABETES MELLITUS: ICD-10-CM

## 2021-10-09 PROCEDURE — 3079F DIAST BP 80-89 MM HG: CPT | Performed by: FAMILY MEDICINE

## 2021-10-09 PROCEDURE — 99214 OFFICE O/P EST MOD 30 MIN: CPT | Performed by: FAMILY MEDICINE

## 2021-10-09 PROCEDURE — 3008F BODY MASS INDEX DOCD: CPT | Performed by: FAMILY MEDICINE

## 2021-10-09 PROCEDURE — 3074F SYST BP LT 130 MM HG: CPT | Performed by: FAMILY MEDICINE

## 2021-10-09 PROCEDURE — 83036 HEMOGLOBIN GLYCOSYLATED A1C: CPT | Performed by: FAMILY MEDICINE

## 2021-10-09 NOTE — PROGRESS NOTES
Alida Stiles is a 54year old female. HPI:   Patient presents with:  Hypertension      This 51-year-old female presents to the office for recheck on her blood pressure. The patient had been experiencing some dizziness earlier in the week.   She has a Surgical History:   Procedure Laterality Date   • CHOLECYSTECTOMY     • COLONOSCOPY     • COLONOSCOPY     • COLONOSCOPY & POLYPECTOMY  11/14/2013    Dr. Agustina Barber; Due 11/14/2018   • OTHER  24 years ago    trauma to right leg   • UPPER GI ENDOSCOPY,BIOP Allergies    ROS:     Pertinent positives and pertinent negatives are as listed in HPI. All other review of symptoms were reviewed and negative.     PHYSICAL EXAM:   /82 (BP Location: Left arm, Patient Position: Sitting, Cuff Size: adult)   Pulse 1 annual physical. She should follow up with her PCP in one month for her physical and recheck on her HTN.     Meds This Visit:  Requested Prescriptions      No prescriptions requested or ordered in this encounter       Imaging & Referrals:  None     Patient

## 2021-10-09 NOTE — PATIENT INSTRUCTIONS
Continue the Losartan 25 mg one tablet daily. Continue to monitor your blood pressure at home at least 3 days per week. Increase your exercise to 30 minutes 3-5 days per week. Limit your salt intake and avoid sugar and fatty foods.     Go to the ER salt), or less if your healthcare provider recommends it. Dining out less often and eating fewer processed foods are two great ways to decrease the amount of salt you consume. At home, flavor your foods with other spices and herbs instead of salt.   · Manag Flaxseed and walnuts are other sources of heart-healthy fats. More on heart-healthy eating  Read food labels  Healthy eating starts at the grocery store. Be sure to pay attention to food labels on packaged foods.  Look for products that are high in fiber a blood pressure regularly. Doing so can save your life. Blood pressure measurements are given as 2 numbers. Systolic blood pressure is the upper number. This is the pressure when the heart contracts. Diastolic blood pressure is the lower number.  This is t sweets. · Get regular exercise. Get up and get active   · Find activities you enjoy that can be done alone or with friends or family. Such activities might include bicycling, dancing, walking, or jogging.   · Park farther away from building entrances to easy. But millions of other people have done it—and you can, too. Quit-smoking aids, such as those listed here, can increase your chance of success:   · Quit lines.  When you call a quit line, you can talk with someone who's trained to help people quit smok also are available in 2-mg and 4-mg strengths. You decide which dose to take based on when you usually had your first cigarette of the day. You'll absorb less nicotine if you eat or drink while using a lozenge. · Bupropion.  This non-nicotine prescription ability to smell and taste start to improve as damaged nerves regrow. · After 2 to 3 weeks: Your circulation and lung function improve. · After 1 to 9 months: Your coughing, congestion, and shortness of breath decrease. Your tiredness decreases.   · After provider may offer a prescription medicine such as bupropion, varenicline, a nicotine inhaler, or nasal spray. Each has advantages and side effects. Your provider can review these with you.    Keep trying  Most smokers make many attempts at quitting before or any other place where you stash them. · Throw away all smoking materials, including matches, lighters, and ashtrays. · Review your list of triggers and your plan for coping with them. · Stay away from people or settings you link with smoking.   · Make best things you can do to keep your heart disease from getting worse. Smoking reduces oxygen flow to your heart. It does this in 2 ways. It speeds the buildup of plaque along the artery walls. And it changes the health of your blood vessels.  This raises yo ideas:  ______________________________________________________________________________  ______________________________________________________________________________  ______________________________________________________________________________  Set Jak Sailors

## 2021-10-17 DIAGNOSIS — K21.9 GASTROESOPHAGEAL REFLUX DISEASE WITHOUT ESOPHAGITIS: ICD-10-CM

## 2021-10-18 RX ORDER — OMEPRAZOLE 20 MG/1
CAPSULE, DELAYED RELEASE ORAL
Qty: 90 CAPSULE | Refills: 0 | Status: SHIPPED | OUTPATIENT
Start: 2021-10-18 | End: 2022-01-17

## 2021-10-18 NOTE — TELEPHONE ENCOUNTER
OMEPRAZOLE 20 MG Oral Capsule Delayed Release 90 capsule 0 7/21/2021    Sig:   TAKE 1 CAPSULE BY MOUTH BEFORE BREAKFAST EVERY DAY       LOV 10/9/21  FOV 11/5/21    Refill sent

## 2021-10-21 DIAGNOSIS — F51.05 INSOMNIA SECONDARY TO ANXIETY: ICD-10-CM

## 2021-10-21 DIAGNOSIS — F41.9 INSOMNIA SECONDARY TO ANXIETY: ICD-10-CM

## 2021-10-21 RX ORDER — TRAZODONE HYDROCHLORIDE 100 MG/1
TABLET ORAL
Qty: 180 TABLET | Refills: 0 | Status: SHIPPED | OUTPATIENT
Start: 2021-10-21 | End: 2022-01-20

## 2021-10-21 NOTE — TELEPHONE ENCOUNTER
Requested Prescriptions     Pending Prescriptions Disp Refills   • TRAZODONE 100 MG Oral Tab [Pharmacy Med Name: TRAZODONE 100 MG TABLET] 180 tablet 0     Sig: TAKE 2 TABLETS BY MOUTH NIGHTLY     Last fill 7/21/21 180 tabs  Last OV 4/14/21  FOV 11/5/21

## 2021-10-25 ENCOUNTER — PATIENT MESSAGE (OUTPATIENT)
Dept: FAMILY MEDICINE CLINIC | Facility: CLINIC | Age: 56
End: 2021-10-25

## 2021-10-25 NOTE — TELEPHONE ENCOUNTER
Patient's  dropped off Drivers License form to [place] out for his wife.     Please call him when ready and he will     I put in triage room

## 2021-10-26 NOTE — TELEPHONE ENCOUNTER
Patient states she went to renew her drivers license and obtain her \"Real ID\". The DMV said she is required to have this form completed d/t a DUI 15 yrs ago. I did place form in your bin for review and signature.

## 2021-10-28 ENCOUNTER — PATIENT MESSAGE (OUTPATIENT)
Dept: FAMILY MEDICINE CLINIC | Facility: CLINIC | Age: 56
End: 2021-10-28

## 2021-10-28 RX ORDER — LOSARTAN POTASSIUM 25 MG/1
TABLET ORAL
Qty: 30 TABLET | Refills: 0 | Status: SHIPPED | OUTPATIENT
Start: 2021-10-28 | End: 2021-11-23

## 2021-10-28 NOTE — TELEPHONE ENCOUNTER
Requested Prescriptions     Signed Prescriptions Disp Refills   • LOSARTAN 25 MG Oral Tab 30 tablet 0     Sig: TAKE 1 TABLET BY MOUTH EVERY DAY     Authorizing Provider: Chioma Prado     Ordering User: Bill Hargrove     30 day approved.  Past has FOV 1

## 2021-11-14 ENCOUNTER — PATIENT MESSAGE (OUTPATIENT)
Dept: FAMILY MEDICINE CLINIC | Facility: CLINIC | Age: 56
End: 2021-11-14

## 2021-11-15 ENCOUNTER — PATIENT MESSAGE (OUTPATIENT)
Dept: FAMILY MEDICINE CLINIC | Facility: CLINIC | Age: 56
End: 2021-11-15

## 2021-11-15 NOTE — TELEPHONE ENCOUNTER
From: Fatoumata Cuadra  To: Kevin Oden PA-C  Sent: 11/14/2021 4:30 PM CST  Subject: Gummy’s     Hi Darlyn   Can you recommend a cBD gummy that will help me stop drinking? I’m hearing they work.      Shila Najera

## 2021-11-23 RX ORDER — LOSARTAN POTASSIUM 25 MG/1
25 TABLET ORAL DAILY
Qty: 90 TABLET | Refills: 0 | Status: SHIPPED | OUTPATIENT
Start: 2021-11-23

## 2021-12-14 DIAGNOSIS — F41.1 GENERALIZED ANXIETY DISORDER: ICD-10-CM

## 2021-12-14 RX ORDER — HYDROXYZINE HYDROCHLORIDE 10 MG/1
TABLET, FILM COATED ORAL EVERY 8 HOURS PRN
Qty: 60 TABLET | Refills: 2 | Status: SHIPPED | OUTPATIENT
Start: 2021-12-14

## 2021-12-14 NOTE — TELEPHONE ENCOUNTER
hydrOXYzine 10 MG Oral Tab 60 tablet 2 9/22/2021    Sig:   Take 1-2 tablets (10-20 mg total) by mouth every 8 (eight) hours as needed for Anxiety.        LOV 11/5/21 Telemed   No FOV

## 2021-12-27 RX ORDER — BUSPIRONE HYDROCHLORIDE 5 MG/1
TABLET ORAL
Qty: 60 TABLET | Refills: 1 | Status: SHIPPED | OUTPATIENT
Start: 2021-12-27

## 2021-12-27 NOTE — TELEPHONE ENCOUNTER
busPIRone HCl 5 MG Oral Tab 30 tablet 1 4/14/2021    Sig:   Take 1-3 tablets (5-15 mg total) by mouth every morning. Patient taking differently:   Take 5-15 mg by mouth 3 (three) times a week.        LOV 10/9/21    No FOV

## 2022-01-16 DIAGNOSIS — K21.9 GASTROESOPHAGEAL REFLUX DISEASE WITHOUT ESOPHAGITIS: ICD-10-CM

## 2022-01-17 RX ORDER — OMEPRAZOLE 20 MG/1
CAPSULE, DELAYED RELEASE ORAL
Qty: 30 CAPSULE | Refills: 0 | Status: SHIPPED | OUTPATIENT
Start: 2022-01-17

## 2022-01-17 NOTE — TELEPHONE ENCOUNTER
Follow up with GI for further refills she has liver disease and needs to follow up with GI #30 given NR

## 2022-01-17 NOTE — TELEPHONE ENCOUNTER
OMEPRAZOLE 20 MG Oral Capsule Delayed Release 90 capsule 0 10/18/2021    Sig: Baljeet Pointer 1 CAPSULE BY MOUTH BEFORE BREAKFAST EVERY DAY       LOV 11/5/21  Needs follow-up with GI for liver disease and liver ultrasound elastography as ordered by Dr. Yanick Zhang

## 2022-01-18 ENCOUNTER — PATIENT MESSAGE (OUTPATIENT)
Dept: FAMILY MEDICINE CLINIC | Facility: CLINIC | Age: 57
End: 2022-01-18

## 2022-01-20 DIAGNOSIS — F41.9 INSOMNIA SECONDARY TO ANXIETY: ICD-10-CM

## 2022-01-20 DIAGNOSIS — F51.05 INSOMNIA SECONDARY TO ANXIETY: ICD-10-CM

## 2022-01-20 RX ORDER — TRAZODONE HYDROCHLORIDE 100 MG/1
TABLET ORAL
Qty: 180 TABLET | Refills: 0 | Status: SHIPPED | OUTPATIENT
Start: 2022-01-20

## 2022-01-20 NOTE — TELEPHONE ENCOUNTER
Requested Prescriptions     Pending Prescriptions Disp Refills   • TRAZODONE 100 MG Oral Tab [Pharmacy Med Name: TRAZODONE 100 MG TABLET] 180 tablet 0     Sig: TAKE 2 TABLETS BY MOUTH EVERY DAY AT NIGHT     Last fill was 10/21/21 180 tabs  Last OV 11/5/21

## 2022-01-31 ENCOUNTER — PATIENT MESSAGE (OUTPATIENT)
Dept: FAMILY MEDICINE CLINIC | Facility: CLINIC | Age: 57
End: 2022-01-31

## 2022-01-31 NOTE — TELEPHONE ENCOUNTER
From: Donna Counter  To: Deion Nunez PA-C  Sent: 1/31/2022 10:19 AM CST  Subject: Omeprazole refill    Hi [place] N[place] going on with this refill? You and I discussed this medication my last visit.  Dr Alan Overton wants me to schedule an appointment with th

## 2022-01-31 NOTE — TELEPHONE ENCOUNTER
From: Tri Saini  To:  Karey Steven PA-C  Sent: 6/15/2017 10:30 AM CDT  Subject: Medication Renewal Request    Original authorizing provider: DAPHNE Hernandez would like a refill of the following medications:  Pao Gordon Please find out what results does pt need to discuss with us on her itzel tomorrow  . Based on recent labs she MUST see dm educ and nephrology . I don't see any upcoming itzel with either or . Also , she needs to be on a 40 min slot

## 2022-02-11 ENCOUNTER — HOSPITAL ENCOUNTER (OUTPATIENT)
Dept: ULTRASOUND IMAGING | Age: 57
Discharge: HOME | End: 2022-02-11
Attending: INTERNAL MEDICINE
Payer: COMMERCIAL

## 2022-02-11 DIAGNOSIS — K75.9 HEPATITIS: ICD-10-CM

## 2022-02-11 PROCEDURE — 76705 ECHO EXAM OF ABDOMEN: CPT | Performed by: INTERNAL MEDICINE

## 2022-02-11 PROCEDURE — 76981 USE PARENCHYMA: CPT | Performed by: INTERNAL MEDICINE

## 2022-02-14 RX ORDER — LOSARTAN POTASSIUM 25 MG/1
TABLET ORAL
Qty: 90 TABLET | Refills: 0 | Status: SHIPPED | OUTPATIENT
Start: 2022-02-14

## 2022-03-01 RX ORDER — BUSPIRONE HYDROCHLORIDE 5 MG/1
TABLET ORAL
Qty: 180 TABLET | Refills: 0 | Status: SHIPPED | OUTPATIENT
Start: 2022-03-02

## 2022-03-01 NOTE — TELEPHONE ENCOUNTER
Pt requesting refill of BUSPIRONE 5 MG Oral Tab    Last Time Medication was Filled:  12/28/2021    Last Office Visit with Provider:  11/05/2021    Appt scheduled on: No future appointments.

## 2022-03-03 RX ORDER — BUSPIRONE HYDROCHLORIDE 5 MG/1
TABLET ORAL
Qty: 180 TABLET | Refills: 0 | OUTPATIENT
Start: 2022-03-04

## 2022-03-03 RX ORDER — HYDROXYZINE HYDROCHLORIDE 10 MG/1
TABLET, FILM COATED ORAL EVERY 8 HOURS PRN
Qty: 60 TABLET | Refills: 2 | Status: SHIPPED | OUTPATIENT
Start: 2022-03-03

## 2022-03-15 ENCOUNTER — PATIENT MESSAGE (OUTPATIENT)
Dept: FAMILY MEDICINE CLINIC | Facility: CLINIC | Age: 57
End: 2022-03-15

## 2022-03-15 NOTE — TELEPHONE ENCOUNTER
Last OV 11/5/21 virtual.  Other than a few virtual visits patient has not been in the office since 8/15/19.     No FOV

## 2022-03-17 RX ORDER — CYCLOBENZAPRINE HCL 5 MG
5 TABLET ORAL 3 TIMES DAILY PRN
Qty: 20 TABLET | Refills: 0 | Status: SHIPPED | OUTPATIENT
Start: 2022-03-17

## 2022-04-22 RX ORDER — TRAZODONE HYDROCHLORIDE 100 MG/1
TABLET ORAL
Qty: 60 TABLET | Refills: 0 | Status: SHIPPED | OUTPATIENT
Start: 2022-04-22

## 2022-05-06 ENCOUNTER — PATIENT MESSAGE (OUTPATIENT)
Dept: FAMILY MEDICINE CLINIC | Facility: CLINIC | Age: 57
End: 2022-05-06

## 2022-05-06 NOTE — TELEPHONE ENCOUNTER
From: Gatito Elizabeth  To: Elena Adams PA-C  Sent: 5/6/2022 9:32 AM CDT  Subject: Bruised ribs    Allen Santizo   I bruised my ribs. I can breath and cough ok but it is painful. What can I take for the pain?     Lauren Ghotra

## 2022-05-18 DIAGNOSIS — F51.05 INSOMNIA SECONDARY TO ANXIETY: ICD-10-CM

## 2022-05-18 DIAGNOSIS — F41.9 INSOMNIA SECONDARY TO ANXIETY: ICD-10-CM

## 2022-05-19 RX ORDER — TRAZODONE HYDROCHLORIDE 100 MG/1
TABLET ORAL
Qty: 60 TABLET | Refills: 0 | OUTPATIENT
Start: 2022-05-19

## 2022-05-26 RX ORDER — BUSPIRONE HYDROCHLORIDE 5 MG/1
TABLET ORAL
Qty: 114 TABLET | Refills: 1 | OUTPATIENT
Start: 2022-05-27

## 2022-06-07 ENCOUNTER — PATIENT MESSAGE (OUTPATIENT)
Dept: FAMILY MEDICINE CLINIC | Facility: CLINIC | Age: 57
End: 2022-06-07

## 2022-06-07 DIAGNOSIS — M79.672 LEFT FOOT PAIN: Primary | ICD-10-CM

## 2022-06-28 ENCOUNTER — PATIENT MESSAGE (OUTPATIENT)
Dept: FAMILY MEDICINE CLINIC | Facility: CLINIC | Age: 57
End: 2022-06-28

## 2022-06-28 NOTE — TELEPHONE ENCOUNTER
From: James Gamez  To: Fawad Vang PA-C  Sent: 6/28/2022 8:37 AM CDT  Subject: Coughing/ sinus     Hi Governor Stands   Well of course 3 days after returning from vacation I know have a bad cough and sinus pressure and stuffed up. I took a Covid-19 test yesterday and it was negative. Do I need antibiotics?  If not what can I take     [facility]

## 2022-06-29 DIAGNOSIS — F41.1 GENERALIZED ANXIETY DISORDER: ICD-10-CM

## 2022-06-29 RX ORDER — HYDROXYZINE HYDROCHLORIDE 10 MG/1
TABLET, FILM COATED ORAL EVERY 8 HOURS PRN
Qty: 60 TABLET | Refills: 2 | Status: SHIPPED | OUTPATIENT
Start: 2022-06-29

## 2022-07-07 ENCOUNTER — PATIENT MESSAGE (OUTPATIENT)
Dept: FAMILY MEDICINE CLINIC | Facility: CLINIC | Age: 57
End: 2022-07-07

## 2022-07-08 ENCOUNTER — PATIENT MESSAGE (OUTPATIENT)
Dept: FAMILY MEDICINE CLINIC | Facility: CLINIC | Age: 57
End: 2022-07-08

## 2022-07-08 DIAGNOSIS — M79.672 LEFT FOOT PAIN: Primary | ICD-10-CM

## 2022-07-08 RX ORDER — CELECOXIB 200 MG/1
200 CAPSULE ORAL 2 TIMES DAILY PRN
Qty: 20 CAPSULE | Refills: 0 | Status: SHIPPED | OUTPATIENT
Start: 2022-07-08

## 2022-07-13 DIAGNOSIS — F41.1 GENERALIZED ANXIETY DISORDER: ICD-10-CM

## 2022-07-13 RX ORDER — HYDROXYZINE HYDROCHLORIDE 10 MG/1
TABLET, FILM COATED ORAL EVERY 8 HOURS PRN
Qty: 60 TABLET | Refills: 2 | OUTPATIENT
Start: 2022-07-13

## 2022-07-20 ENCOUNTER — PATIENT MESSAGE (OUTPATIENT)
Dept: FAMILY MEDICINE CLINIC | Facility: CLINIC | Age: 57
End: 2022-07-20

## 2022-07-20 NOTE — TELEPHONE ENCOUNTER
From: Lg Link  To: Ray Carranza PA-C  Sent: 7/20/2022 8:15 AM CDT  Subject: I canceled my appointment     Good Morning   You left me a message this morning.  I canceled my appointment for Friday Thmartha Parra

## 2022-08-10 DIAGNOSIS — F41.1 GENERALIZED ANXIETY DISORDER: ICD-10-CM

## 2022-08-15 RX ORDER — HYDROXYZINE HYDROCHLORIDE 10 MG/1
TABLET, FILM COATED ORAL EVERY 8 HOURS PRN
Qty: 180 TABLET | Refills: 1 | Status: SHIPPED | OUTPATIENT
Start: 2022-08-15

## 2022-09-07 DIAGNOSIS — I10 PRIMARY HYPERTENSION: ICD-10-CM

## 2022-09-08 DIAGNOSIS — I10 PRIMARY HYPERTENSION: ICD-10-CM

## 2022-09-08 RX ORDER — LOSARTAN POTASSIUM 50 MG/1
TABLET ORAL
Qty: 90 TABLET | Refills: 0 | OUTPATIENT
Start: 2022-09-08

## 2022-09-08 RX ORDER — LOSARTAN POTASSIUM 50 MG/1
50 TABLET ORAL DAILY
Qty: 30 TABLET | Refills: 0 | Status: SHIPPED | OUTPATIENT
Start: 2022-09-08

## 2022-09-13 ENCOUNTER — PATIENT MESSAGE (OUTPATIENT)
Dept: FAMILY MEDICINE CLINIC | Facility: CLINIC | Age: 57
End: 2022-09-13

## 2022-09-17 DIAGNOSIS — F41.1 GENERALIZED ANXIETY DISORDER: ICD-10-CM

## 2022-09-19 RX ORDER — HYDROXYZINE HYDROCHLORIDE 10 MG/1
TABLET, FILM COATED ORAL EVERY 8 HOURS PRN
Qty: 180 TABLET | Refills: 1 | OUTPATIENT
Start: 2022-09-19

## 2022-09-30 LAB
ABSOLUTE EOSINOPHILS: 61 (ref 15–500)
ABSOLUTE LYMPHOCYTES: 1837 (ref 850–3900)
ABSOLUTE MONOCYTES: 209 (ref 200–950)
ABSOLUTE NEUTROPHILS: 3333 (ref 1500–7800)
ALPHA FETOPROTEIN,$TUMOR MARKER: 10 (ref ?–6.1)
AMB EXT CALCIUM: 9.9 (ref 8.6–10.4)
AMB EXT CARBON DIOXIDE: 27 (ref 20–32)
AMB EXT CHLORIDE: 105 (ref 98–110)
AMB EXT CMP ALT: 21 U/L (ref 6–29)
AMB EXT CMP AST: 40 U/L (ref 10–35)
AMB EXT GLUCOSE: 99 MG/DL (ref 65–99)
AMB EXT HEMATOCRIT: 43.8 (ref 35–45)
AMB EXT HEMOGLOBIN: 15.3 (ref 11.7–15.5)
AMB EXT MCV: 104.3 (ref 80–100)
AMB EXT POSTASSIUM: 4.3 MMOL/L (ref 3.5–5.3)
AMB EXT SODIUM: 141 MMOL/L (ref 135–146)
AMB EXT WBC: 5.5 X10(3)UL (ref 3.8–10.8)
BASOPHILS, %: 1.1
EOSINOPHILS, %: 1.1
INR: 1.1 (ref 0.8–1.2)
LYMPHOCYTES: 33.4
MCH: 36.4 PG (ref 27–33)
MCHC: 34.9 G/DL (ref 32–36)
MONOCYTES: 3.8 %
MPV: 10.9 (ref 7.5–12.5)
NEUTROPHILS: 60.6 %
PLATELET COUNT: 164 (ref 140–400)
PT: 10.7 SEC (ref 9–11.5)
RDW: 11.8 % (ref 11–15)

## 2022-10-01 ENCOUNTER — PATIENT MESSAGE (OUTPATIENT)
Dept: FAMILY MEDICINE CLINIC | Facility: CLINIC | Age: 57
End: 2022-10-01

## 2022-10-04 NOTE — TELEPHONE ENCOUNTER
From: Jovita Hodge  To: Obey Khalil PA-C  Sent: 10/1/2022 8:53 AM CDT  Subject: Test questions     So how were my liver enzymes?

## 2022-10-06 DIAGNOSIS — I10 PRIMARY HYPERTENSION: ICD-10-CM

## 2022-10-06 RX ORDER — LOSARTAN POTASSIUM 50 MG/1
TABLET ORAL
Qty: 90 TABLET | Refills: 0 | Status: SHIPPED | OUTPATIENT
Start: 2022-10-06

## 2022-10-14 ENCOUNTER — OFFICE VISIT (OUTPATIENT)
Dept: FAMILY MEDICINE CLINIC | Facility: CLINIC | Age: 57
End: 2022-10-14
Payer: COMMERCIAL

## 2022-10-14 VITALS
HEART RATE: 86 BPM | BODY MASS INDEX: 26.46 KG/M2 | HEIGHT: 64 IN | OXYGEN SATURATION: 98 % | SYSTOLIC BLOOD PRESSURE: 130 MMHG | TEMPERATURE: 98 F | DIASTOLIC BLOOD PRESSURE: 70 MMHG | RESPIRATION RATE: 18 BRPM | WEIGHT: 155 LBS

## 2022-10-14 DIAGNOSIS — E78.2 MIXED HYPERLIPIDEMIA: ICD-10-CM

## 2022-10-14 DIAGNOSIS — M70.21 OLECRANON BURSITIS OF RIGHT ELBOW: ICD-10-CM

## 2022-10-14 DIAGNOSIS — H00.014 HORDEOLUM EXTERNUM OF LEFT UPPER EYELID: ICD-10-CM

## 2022-10-14 DIAGNOSIS — Z01.419 WELL FEMALE EXAM WITH ROUTINE GYNECOLOGICAL EXAM: Primary | ICD-10-CM

## 2022-10-14 DIAGNOSIS — Z11.51 SCREENING FOR HPV (HUMAN PAPILLOMAVIRUS): ICD-10-CM

## 2022-10-14 DIAGNOSIS — I10 PRIMARY HYPERTENSION: ICD-10-CM

## 2022-10-14 DIAGNOSIS — F17.200 CURRENT SMOKER: ICD-10-CM

## 2022-10-14 DIAGNOSIS — Z23 NEED FOR VACCINATION: ICD-10-CM

## 2022-10-14 DIAGNOSIS — K70.2 FIBROSIS OF LIVER DUE TO ALCOHOL: ICD-10-CM

## 2022-10-14 DIAGNOSIS — Z71.6 ENCOUNTER FOR SMOKING CESSATION COUNSELING: ICD-10-CM

## 2022-10-14 DIAGNOSIS — F10.20 ALCOHOLIC (HCC): ICD-10-CM

## 2022-10-14 DIAGNOSIS — R71.8 ELEVATED MCV: ICD-10-CM

## 2022-10-14 DIAGNOSIS — G57.62 NEUROMA, MORTON'S, LEFT: ICD-10-CM

## 2022-10-14 DIAGNOSIS — Z12.4 ENCOUNTER FOR SCREENING FOR CERVICAL CANCER: ICD-10-CM

## 2022-10-14 PROCEDURE — 87624 HPV HI-RISK TYP POOLED RSLT: CPT | Performed by: FAMILY MEDICINE

## 2022-10-17 PROBLEM — E78.2 MIXED HYPERLIPIDEMIA: Status: ACTIVE | Noted: 2022-10-17

## 2022-10-17 PROBLEM — G57.62: Status: ACTIVE | Noted: 2022-10-17

## 2022-10-17 PROBLEM — M70.21 OLECRANON BURSITIS OF RIGHT ELBOW: Status: ACTIVE | Noted: 2022-10-17

## 2022-10-17 PROBLEM — R71.8 ELEVATED MCV: Status: ACTIVE | Noted: 2022-10-17

## 2022-10-17 PROBLEM — K70.2 FIBROSIS OF LIVER DUE TO ALCOHOL: Status: ACTIVE | Noted: 2022-10-17

## 2022-10-17 LAB — HPV I/H RISK 1 DNA SPEC QL NAA+PROBE: NEGATIVE

## 2022-10-19 ENCOUNTER — TELEPHONE (OUTPATIENT)
Dept: FAMILY MEDICINE CLINIC | Facility: CLINIC | Age: 57
End: 2022-10-19

## 2022-10-19 NOTE — TELEPHONE ENCOUNTER
Received Labs via mail from [id number] labs were abstracted into patient's chart 10/19/2022 message routed to DAPHNE Mosley to review patient results

## 2022-10-19 NOTE — TELEPHONE ENCOUNTER
Received a fax from pharmacy stating gentamicin 0.3% ointment is on back order and they are requesting an alternative. Please advise.

## 2022-10-20 RX ORDER — GENTAMICIN SULFATE 3 MG/ML
2 SOLUTION/ DROPS OPHTHALMIC EVERY 4 HOURS
Qty: 5 ML | Refills: 0 | Status: SHIPPED | OUTPATIENT
Start: 2022-10-20

## 2022-11-03 ENCOUNTER — PATIENT MESSAGE (OUTPATIENT)
Dept: FAMILY MEDICINE CLINIC | Facility: CLINIC | Age: 57
End: 2022-11-03

## 2022-11-04 RX ORDER — MELOXICAM 7.5 MG/1
7.5 TABLET ORAL DAILY
Qty: 20 TABLET | Refills: 0 | Status: SHIPPED | OUTPATIENT
Start: 2022-11-04

## 2022-11-07 ENCOUNTER — TELEPHONE (OUTPATIENT)
Dept: FAMILY MEDICINE CLINIC | Facility: CLINIC | Age: 57
End: 2022-11-07

## 2022-11-07 NOTE — TELEPHONE ENCOUNTER
Received a faxed from [facility]  that patient is due for blood work and  Mammogram both were ordered. Will send patient friendly reminder via [employer] message.

## 2022-11-25 RX ORDER — MELOXICAM 7.5 MG/1
7.5 TABLET ORAL DAILY
Qty: 20 TABLET | Refills: 0 | Status: SHIPPED | OUTPATIENT
Start: 2022-11-25

## 2022-12-01 DIAGNOSIS — F41.9 INSOMNIA SECONDARY TO ANXIETY: ICD-10-CM

## 2022-12-01 DIAGNOSIS — F51.05 INSOMNIA SECONDARY TO ANXIETY: ICD-10-CM

## 2022-12-01 RX ORDER — TRAZODONE HYDROCHLORIDE 100 MG/1
150 TABLET ORAL NIGHTLY
Qty: 135 TABLET | Refills: 1 | Status: SHIPPED | OUTPATIENT
Start: 2022-12-01 | End: 2023-03-01

## 2022-12-14 NOTE — TELEPHONE ENCOUNTER
Pt requesting refill of hydrOXYzine 10 MG Oral Tab  Last Time Medication was Prescribed : 12/14/2021 Qty# 60 with 2 refills  -------------------------  Pt requesting refill of busPIRone 5 MG Oral Tab  Last Time Medication was Prescribed :  03/02/2022 qty #180 with 0 refills. Denied    Last Office Visit with Provider:  11/05/2021 Telemedicine with Sam Waller 10/09/2021- HTN    Recommended to return by Provider: Return in about 1 month (around 11/9/2021) for with  for annual physical and recheck on blood pressure. .    No future appointments. No

## 2022-12-15 ENCOUNTER — PATIENT MESSAGE (OUTPATIENT)
Dept: FAMILY MEDICINE CLINIC | Facility: CLINIC | Age: 57
End: 2022-12-15

## 2022-12-15 DIAGNOSIS — Z20.822 SUSPECTED COVID-19 VIRUS INFECTION: Primary | ICD-10-CM

## 2022-12-15 LAB — AMB EXT COVID-19 RESULT: DETECTED

## 2022-12-16 RX ORDER — MELOXICAM 7.5 MG/1
7.5 TABLET ORAL DAILY
Qty: 20 TABLET | Refills: 0 | Status: SHIPPED | OUTPATIENT
Start: 2022-12-16

## 2022-12-16 NOTE — TELEPHONE ENCOUNTER
From: Herbert Davis  To: Lorie Torrez PA-C  Sent: 12/15/2022 11:26 AM CST  Subject: Flu    Hi Coykristen Mallory   I woke up this morning with a 101.7 fever and I ache from head to toes. With my blood pressure and liver concerns what can I take?  I feel horrible     [place] Arena

## 2022-12-16 NOTE — TELEPHONE ENCOUNTER
Spoke to patient. She declines Paxlovid at this time. States symptoms are improving. Tested positive with home test. Unsure if she needs PCR test for work. Did place in case she needs to have one. Also advised local pharmacies can provide. She will call if she needs anything.

## 2023-01-09 DIAGNOSIS — I10 PRIMARY HYPERTENSION: ICD-10-CM

## 2023-01-10 RX ORDER — LOSARTAN POTASSIUM 50 MG/1
TABLET ORAL
Qty: 90 TABLET | Refills: 0 | Status: SHIPPED | OUTPATIENT
Start: 2023-01-10

## 2023-02-04 ENCOUNTER — PATIENT MESSAGE (OUTPATIENT)
Dept: FAMILY MEDICINE CLINIC | Facility: CLINIC | Age: 58
End: 2023-02-04

## 2023-02-06 ENCOUNTER — PATIENT MESSAGE (OUTPATIENT)
Dept: FAMILY MEDICINE CLINIC | Facility: CLINIC | Age: 58
End: 2023-02-06

## 2023-02-07 NOTE — TELEPHONE ENCOUNTER
Please advise on extended work release note. I did provide one for patient on 2/3/23  Me  to Arva Confer 9:58 AM  What exact days did you miss? When do you plan to return? Please schedule an appointment with Deepak Honeycutt to discuss your anxiety. In addition, if your employer requires [place] paperwork d/t number of days missed, an appointment will have to be made.   Samuel Singh RN

## 2023-02-10 ENCOUNTER — PATIENT MESSAGE (OUTPATIENT)
Dept: FAMILY MEDICINE CLINIC | Facility: CLINIC | Age: 58
End: 2023-02-10

## 2023-02-13 ENCOUNTER — PATIENT MESSAGE (OUTPATIENT)
Dept: FAMILY MEDICINE CLINIC | Facility: CLINIC | Age: 58
End: 2023-02-13

## 2023-02-15 NOTE — TELEPHONE ENCOUNTER
From: James Gamez  To: Fawad Vang PA-C  Sent: 2/4/2023 9:19 AM CST  Subject: UTI    Good morning   What can I take over the counter for a UTI infection. It just started last night.     Deb Moran

## 2023-04-10 ENCOUNTER — PATIENT MESSAGE (OUTPATIENT)
Dept: FAMILY MEDICINE CLINIC | Facility: CLINIC | Age: 58
End: 2023-04-10

## 2023-04-10 DIAGNOSIS — I10 PRIMARY HYPERTENSION: ICD-10-CM

## 2023-04-10 RX ORDER — LOSARTAN POTASSIUM 50 MG/1
TABLET ORAL
Qty: 90 TABLET | Refills: 0 | Status: SHIPPED | OUTPATIENT
Start: 2023-04-10

## 2023-05-11 DIAGNOSIS — F41.1 GENERALIZED ANXIETY DISORDER: ICD-10-CM

## 2023-05-12 RX ORDER — HYDROXYZINE HYDROCHLORIDE 10 MG/1
TABLET, FILM COATED ORAL EVERY 8 HOURS PRN
Qty: 180 TABLET | Refills: 1 | Status: SHIPPED | OUTPATIENT
Start: 2023-05-12

## 2023-05-31 DIAGNOSIS — F51.05 INSOMNIA SECONDARY TO ANXIETY: ICD-10-CM

## 2023-05-31 DIAGNOSIS — F41.9 INSOMNIA SECONDARY TO ANXIETY: ICD-10-CM

## 2023-06-05 RX ORDER — TRAZODONE HYDROCHLORIDE 100 MG/1
TABLET ORAL
Qty: 45 TABLET | Refills: 0 | Status: SHIPPED | OUTPATIENT
Start: 2023-06-05

## 2023-06-11 DIAGNOSIS — F41.1 GENERALIZED ANXIETY DISORDER: ICD-10-CM

## 2023-06-14 RX ORDER — HYDROXYZINE HYDROCHLORIDE 10 MG/1
TABLET, FILM COATED ORAL EVERY 8 HOURS PRN
Qty: 180 TABLET | Refills: 1 | OUTPATIENT
Start: 2023-06-14

## 2023-07-04 DIAGNOSIS — F41.9 INSOMNIA SECONDARY TO ANXIETY: ICD-10-CM

## 2023-07-04 DIAGNOSIS — F51.05 INSOMNIA SECONDARY TO ANXIETY: ICD-10-CM

## 2023-07-05 RX ORDER — TRAZODONE HYDROCHLORIDE 100 MG/1
150 TABLET ORAL NIGHTLY
Qty: 45 TABLET | Refills: 0 | OUTPATIENT
Start: 2023-07-05

## 2023-07-16 DIAGNOSIS — I10 PRIMARY HYPERTENSION: ICD-10-CM

## 2023-07-19 RX ORDER — LOSARTAN POTASSIUM 50 MG/1
50 TABLET ORAL DAILY
Qty: 90 TABLET | Refills: 0 | OUTPATIENT
Start: 2023-07-19

## 2023-07-19 RX ORDER — LOSARTAN POTASSIUM 50 MG/1
50 TABLET ORAL DAILY
Qty: 30 TABLET | Refills: 0 | Status: SHIPPED | OUTPATIENT
Start: 2023-07-19

## 2023-07-24 ENCOUNTER — PATIENT MESSAGE (OUTPATIENT)
Dept: FAMILY MEDICINE CLINIC | Facility: CLINIC | Age: 58
End: 2023-07-24

## 2023-07-24 NOTE — TELEPHONE ENCOUNTER
Requesting refill on losartan and trazodone. LF losartan on 7/19/2023 for 30 days. LF trazodone on 6/5/2023 for 30 days. LOV 10/14/2022.

## 2023-08-16 DIAGNOSIS — I10 PRIMARY HYPERTENSION: ICD-10-CM

## 2023-08-16 RX ORDER — LOSARTAN POTASSIUM 50 MG/1
50 TABLET ORAL DAILY
Qty: 30 TABLET | Refills: 0 | Status: SHIPPED | OUTPATIENT
Start: 2023-08-16 | End: 2023-08-16

## 2023-09-08 ENCOUNTER — PATIENT MESSAGE (OUTPATIENT)
Dept: FAMILY MEDICINE CLINIC | Facility: CLINIC | Age: 58
End: 2023-09-08

## 2023-09-08 RX ORDER — LINACLOTIDE 145 UG/1
145 CAPSULE, GELATIN COATED ORAL DAILY
Qty: 90 CAPSULE | Refills: 1 | Status: SHIPPED | OUTPATIENT
Start: 2023-09-08

## 2023-10-30 ENCOUNTER — PATIENT MESSAGE (OUTPATIENT)
Dept: FAMILY MEDICINE CLINIC | Facility: CLINIC | Age: 58
End: 2023-10-30

## 2023-10-30 DIAGNOSIS — F51.05 INSOMNIA SECONDARY TO ANXIETY: ICD-10-CM

## 2023-10-30 DIAGNOSIS — F41.9 INSOMNIA SECONDARY TO ANXIETY: ICD-10-CM

## 2023-10-30 RX ORDER — TRAZODONE HYDROCHLORIDE 100 MG/1
150 TABLET ORAL NIGHTLY
Qty: 135 TABLET | Refills: 1 | Status: SHIPPED | OUTPATIENT
Start: 2023-10-30 | End: 2024-04-27

## 2023-11-19 DIAGNOSIS — I10 PRIMARY HYPERTENSION: ICD-10-CM

## 2023-11-20 RX ORDER — LOSARTAN POTASSIUM 50 MG/1
50 TABLET ORAL DAILY
Qty: 30 TABLET | Refills: 0 | Status: SHIPPED | OUTPATIENT
Start: 2023-11-20

## 2023-11-20 NOTE — TELEPHONE ENCOUNTER
Requested Prescriptions     Pending Prescriptions Disp Refills    LOSARTAN 50 MG Oral Tab [Pharmacy Med Name: LOSARTAN POTASSIUM 50 MG TAB] 90 tablet 0     Sig: TAKE 1 TABLET BY MOUTH EVERY DAY     Last fill was 8/16/23 #90    Last OV virtual 8/20/23. In person 10/14/22    No FOV    #30 provided and [employer] message sent she is due for in person wellness appointment and also to complete labs.   Labs incomplete from August.

## 2023-12-19 DIAGNOSIS — I10 PRIMARY HYPERTENSION: ICD-10-CM

## 2023-12-19 RX ORDER — LOSARTAN POTASSIUM 50 MG/1
50 TABLET ORAL DAILY
Qty: 30 TABLET | Refills: 0 | Status: SHIPPED | OUTPATIENT
Start: 2023-12-19 | End: 2023-12-22

## 2023-12-21 ENCOUNTER — PATIENT MESSAGE (OUTPATIENT)
Dept: FAMILY MEDICINE CLINIC | Facility: CLINIC | Age: 58
End: 2023-12-21

## 2023-12-21 DIAGNOSIS — I10 PRIMARY HYPERTENSION: ICD-10-CM

## 2023-12-22 RX ORDER — LOSARTAN POTASSIUM 50 MG/1
50 TABLET ORAL DAILY
Qty: 90 TABLET | Refills: 0 | Status: SHIPPED | OUTPATIENT
Start: 2023-12-22

## 2023-12-23 DIAGNOSIS — F41.1 GENERALIZED ANXIETY DISORDER: ICD-10-CM

## 2023-12-23 RX ORDER — HYDROXYZINE HYDROCHLORIDE 10 MG/1
TABLET, FILM COATED ORAL EVERY 8 HOURS PRN
Qty: 180 TABLET | Refills: 0 | Status: SHIPPED | OUTPATIENT
Start: 2023-12-23 | End: 2024-01-18

## 2024-01-18 DIAGNOSIS — F41.1 GENERALIZED ANXIETY DISORDER: ICD-10-CM

## 2024-01-18 RX ORDER — HYDROXYZINE HYDROCHLORIDE 10 MG/1
TABLET, FILM COATED ORAL EVERY 8 HOURS PRN
Qty: 180 TABLET | Refills: 0 | Status: SHIPPED | OUTPATIENT
Start: 2024-01-18

## 2024-01-18 NOTE — TELEPHONE ENCOUNTER
Requested Prescriptions     Signed Prescriptions Disp Refills    HYDROXYZINE 10 MG Oral Tab 180 tablet 0     Sig: TAKE 1-2 TABLETS (10-20 MG TOTAL) BY MOUTH EVERY 8 (EIGHT) HOURS AS NEEDED FOR ANXIETY.     Authorizing Provider: NAT RODRIGUEZ     Ordering User: DEL LAZARO     Refilled per protocol/OV notes

## 2024-01-25 ENCOUNTER — PATIENT MESSAGE (OUTPATIENT)
Dept: FAMILY MEDICINE CLINIC | Facility: CLINIC | Age: 59
End: 2024-01-25

## 2024-01-25 NOTE — TELEPHONE ENCOUNTER
From: June Goncalves  To: Darlyn Carrillo  Sent: 1/25/2024 12:17 PM CST  Subject: Connie Santizo,  I’ve had Virtago for 2 weeks now. I spin when I lie down backwards and sideways. What do u recommend I take? I’ve been taking Dramamine and it doesn’t help. Ears are sore. I want to get my bloodwork done but can’t drive until I’m not dizzy anymore. If a video visit is needed for an antibiotic or something let me know and I will schedule it. I’ve also been getting headaches every day. I just need the dizziness to stop    Aida

## 2024-02-03 ENCOUNTER — PATIENT MESSAGE (OUTPATIENT)
Dept: FAMILY MEDICINE CLINIC | Facility: CLINIC | Age: 59
End: 2024-02-03

## 2024-02-03 DIAGNOSIS — K76.0 FATTY LIVER: ICD-10-CM

## 2024-02-03 DIAGNOSIS — K70.2 FIBROSIS OF LIVER DUE TO ALCOHOL: ICD-10-CM

## 2024-02-03 DIAGNOSIS — R74.01 TRANSAMINITIS: Primary | ICD-10-CM

## 2024-02-03 DIAGNOSIS — F10.21 HISTORY OF ALCOHOL DEPENDENCE (HCC): ICD-10-CM

## 2024-02-14 ENCOUNTER — OFFICE VISIT (OUTPATIENT)
Dept: FAMILY MEDICINE CLINIC | Facility: CLINIC | Age: 59
End: 2024-02-14
Payer: COMMERCIAL

## 2024-02-14 VITALS
WEIGHT: 154 LBS | HEART RATE: 88 BPM | OXYGEN SATURATION: 98 % | TEMPERATURE: 97 F | BODY MASS INDEX: 26.29 KG/M2 | DIASTOLIC BLOOD PRESSURE: 86 MMHG | HEIGHT: 64 IN | RESPIRATION RATE: 18 BRPM | SYSTOLIC BLOOD PRESSURE: 138 MMHG

## 2024-02-14 DIAGNOSIS — F17.210 SMOKING GREATER THAN 20 PACK YEARS: ICD-10-CM

## 2024-02-14 DIAGNOSIS — M70.21 OLECRANON BURSITIS OF RIGHT ELBOW: ICD-10-CM

## 2024-02-14 DIAGNOSIS — Z13.6 SCREENING FOR HEART DISEASE: ICD-10-CM

## 2024-02-14 DIAGNOSIS — R79.89 ELEVATED FERRITIN: ICD-10-CM

## 2024-02-14 DIAGNOSIS — Z78.0 POST-MENOPAUSAL: ICD-10-CM

## 2024-02-14 DIAGNOSIS — K70.2 FIBROSIS OF LIVER DUE TO ALCOHOL: ICD-10-CM

## 2024-02-14 DIAGNOSIS — Z01.419 WELL FEMALE EXAM WITH ROUTINE GYNECOLOGICAL EXAM: Primary | ICD-10-CM

## 2024-02-14 DIAGNOSIS — Z71.6 ENCOUNTER FOR SMOKING CESSATION COUNSELING: ICD-10-CM

## 2024-02-14 DIAGNOSIS — Z12.4 ENCOUNTER FOR SCREENING FOR CERVICAL CANCER: ICD-10-CM

## 2024-02-14 DIAGNOSIS — F10.20 ALCOHOLIC (HCC): ICD-10-CM

## 2024-02-14 DIAGNOSIS — I10 PRIMARY HYPERTENSION: ICD-10-CM

## 2024-02-14 DIAGNOSIS — Z11.51 SCREENING FOR HUMAN PAPILLOMAVIRUS (HPV): ICD-10-CM

## 2024-02-14 DIAGNOSIS — E78.2 MIXED HYPERLIPIDEMIA: ICD-10-CM

## 2024-02-14 DIAGNOSIS — R74.01 TRANSAMINITIS: ICD-10-CM

## 2024-02-14 DIAGNOSIS — Z12.2 ENCOUNTER FOR SCREENING FOR LUNG CANCER: ICD-10-CM

## 2024-02-14 PROCEDURE — 88175 CYTOPATH C/V AUTO FLUID REDO: CPT | Performed by: FAMILY MEDICINE

## 2024-02-14 PROCEDURE — 87624 HPV HI-RISK TYP POOLED RSLT: CPT | Performed by: FAMILY MEDICINE

## 2024-02-14 RX ORDER — MECLIZINE HYDROCHLORIDE 25 MG/1
25 TABLET ORAL 3 TIMES DAILY
COMMUNITY
Start: 2024-01-26

## 2024-02-14 RX ORDER — LOSARTAN POTASSIUM 50 MG/1
75 TABLET ORAL DAILY
Qty: 135 TABLET | Refills: 0 | Status: SHIPPED | OUTPATIENT
Start: 2024-02-14 | End: 2024-05-14

## 2024-02-14 RX ORDER — CIPROFLOXACIN 500 MG/1
500 TABLET, FILM COATED ORAL EVERY 12 HOURS
COMMUNITY
Start: 2024-01-26 | End: 2024-02-14 | Stop reason: ALTCHOICE

## 2024-02-14 RX ORDER — PHENAZOPYRIDINE HYDROCHLORIDE 100 MG/1
100 TABLET, FILM COATED ORAL 3 TIMES DAILY
COMMUNITY
Start: 2024-01-26

## 2024-02-14 NOTE — PROGRESS NOTES
HPI:   June Goncalves is a 58 year old female who presents for a complete physical exam.     Patient is due for mammogram,  CT lung cancer screening, bone density and Ultrafast CT of the heart orders provided today at office visit    ---HTN/hyperlipidemia  Patient has noticed some spikes in her blood pressure recently was under going treatment for vertigo which has finally improved.  Takes losartan 50 mg daily  Home blood pressure monitoring has been elevated in the 120-130/90 range  Her exercise and diet have been poor is starting to improve them after seeing her cholesterol results and liver function tests  Blood pressure in the office today 138/86  Losartan 50 mg tolerated no side effects denies any chest pain, SOB, or fainting.  No swelling in the hands or feet.  No symptoms of PAD.  Vertigo is better quick care treated for sinusitis by end of January  No prior heart scan order provided today  2/2/2024 , cholesterol 272 secondary to past few months of poor eating and lifestyle has changed to Mediterranean diet and is starting to exercise  Patient is an active smoker half a pack per day for the last 40 years  ---Olecranon bursitis    Right elbow has been present for over the past year did not resolve, no warmth or tenderness sometimes will get an ache in her neck but no pain in the elbow and no signs of infection    --- Fatty liver/advanced liver disease  GI Dr. Ruiz for liver disease fibrosis not quite cirrhosis has follow up 4/ 9/2024  Hep A x 2 Hep B did one shot       Impression liver scanning 2/11/22   CONCLUSION:  Metavir score F 3 consistent with moderate-severe liver fibrosis.          Dictated by (CST): Mane Ortega MD on 2/11/2022       CPE  Social history history of alcoholism has not had any heavy drinking since 2022 was drinking intermittent wine and back which she states she has not had any since [place] was only having an occasional wine.,  lives and .  No longer  working left her job a year ago at the [employer] has anxiety which makes work difficult  Smoker 1/2/day  for 40 years   Alcohol none since 12/25/2023  Exercise restarting was not doing any exercise  COVID vaccine  Is aware of booster for the fall  Tdap 12/4/2014  She defers COVID and flu vaccinations   Shingrix completed  Dental exams  overdue   Eye exams UTD  PHQ2 is at a 0    Sleep Apnea   Never tested no snoring no apnea her   Exercise is restarting exercise again slowly with walking   DIet recently changed to Mediterranean diet chicken and vegetables avoiding red meat and processed foods    FH CAD or cancer  Dad stroke, GM ovarian cancer, sister  Cervical cancer   Symptoms: is menopausal. No bleeding.  Abnormal pap normal in past is due  Pap last year no endocervical cells 10/14/2022 negative HPV  Sexually active no   Last colonoscopy UTD done 9/8/2020 polyps due 9/8/1930; no FH colon cancer  Last mammogram done 2/26/2020 order printed today placed 8/31/2023 overdue history of breasts cyst     Labs done 2/2/2024 reviewed with patient  MCV is now normal hemoglobin slightly elevated due to smoking, liver function tests elevated, lipids elevated and ferritin elevated we reviewed relationship with inflammation with liver disease.    Component      Latest Ref Rng 2/2/2024   WBC      3.8 - 10.8 Thousand/uL 5.5    RBC      3.80 - 5.10 Million/uL 4.48    Hemoglobin      11.7 - 15.5 g/dL 15.6 (H)    Hematocrit      35.0 - 45.0 % 44.3    MCV      80.0 - 100.0 fL 98.9    MCH      27.0 - 33.0 pg 34.8 (H)    MCHC      32.0 - 36.0 g/dL 35.2    RDW      11.0 - 15.0 % 11.4    Platelet Count      140 - 400 Thousand/uL 157    MPV      7.5 - 12.5 fL 11.2    Neutrophils Absolute      1,500 - 7,800 cells/uL 2,959    Lymphocytes Absolute      850 - 3,900 cells/uL 2,195    Monocytes Absolute      200 - 950 cells/uL 237    Eosinophils Absolute      15 - 500 cells/uL 61    Basophils Absolute      0 - 200 cells/uL 50     Neutrophils %      % 53.8    Lymphocytes %      % 39.9    Monocytes %      % 4.3    Eosinophils %      % 1.1    Basophils %      % 0.9    Glucose      65 - 99 mg/dL 86    BUN      7 - 25 mg/dL 8    CREATININE      0.50 - 1.03 mg/dL 0.74    EGFR      > OR = 60 mL/min/1.73m2 94    BUN/CREATININE RATIO      6 - 22 (calc) SEE NOTE:    Sodium      135 - 146 mmol/L 138    Potassium      3.5 - 5.3 mmol/L 3.6    Chloride      98 - 110 mmol/L 102    Carbon Dioxide, Total      20 - 32 mmol/L 26    CALCIUM      8.6 - 10.4 mg/dL 9.5    PROTEIN, TOTAL      6.1 - 8.1 g/dL 6.6    Albumin      3.6 - 5.1 g/dL 4.2    Globulin      1.9 - 3.7 g/dL (calc) 2.4    A/G Ratio      1.0 - 2.5 (calc) 1.8    Total Bilirubin      0.2 - 1.2 mg/dL 1.5 (H)    Alkaline Phosphatase      37 - 153 U/L 92    AST (SGOT)      10 - 35 U/L 53 (H)    ALT (SGPT)      6 - 29 U/L 32 (H)    Cholesterol, Total      <200 mg/dL 272 (H)    HDL Cholesterol      > OR = 50 mg/dL 57    Triglycerides      <150 mg/dL 160 (H)    LDL Cholesterol Calc      mg/dL (calc) 184 (H)    Chol/HDL Ratio      <5.0 (calc) 4.8    NON-HDL CHOLESTEROL      <130 mg/dL (calc) 215 (H)    TSH      0.40 - 4.50 mIU/L 2.98    T4,Free (Direct)      0.8 - 1.8 ng/dL 1.1    HEMOGLOBIN A1c      <5.7 % of total Hgb 5.3    Vitamin B12      200 - 1,100 pg/mL 564    FOLATE, SERUM      ng/mL 7.9    FERRITIN      16 - 232 ng/mL 345 (H)       Legend:  (H) High      Wt Readings from Last 6 Encounters:   02/14/24 154 lb (69.9 kg)   10/14/22 155 lb (70.3 kg)   02/21/22 165 lb (74.8 kg)   11/05/21 166 lb (75.3 kg)   10/09/21 166 lb (75.3 kg)   04/14/21 163 lb (73.9 kg)     Body mass index is 26.43 kg/m².       Results for orders placed or performed in visit on 08/30/23   CBC With Differential With Platelet   Result Value Ref Range    WHITE BLOOD CELL COUNT 5.5 3.8 - 10.8 Thousand/uL    RED BLOOD CELL COUNT 4.48 3.80 - 5.10 Million/uL    HEMOGLOBIN 15.6 (H) 11.7 - 15.5 g/dL    HEMATOCRIT 44.3 35.0 - 45.0 %     MCV 98.9 80.0 - 100.0 fL    MCH 34.8 (H) 27.0 - 33.0 pg    MCHC 35.2 32.0 - 36.0 g/dL    RDW 11.4 11.0 - 15.0 %    PLATELET COUNT 157 140 - 400 Thousand/uL    MPV 11.2 7.5 - 12.5 fL    ABSOLUTE NEUTROPHILS 2,959 1,500 - 7,800 cells/uL    ABSOLUTE LYMPHOCYTES 2,195 850 - 3,900 cells/uL    ABSOLUTE MONOCYTES 237 200 - 950 cells/uL    ABSOLUTE EOSINOPHILS 61 15 - 500 cells/uL    ABSOLUTE BASOPHILS 50 0 - 200 cells/uL    NEUTROPHILS 53.8 %    LYMPHOCYTES 39.9 %    MONOCYTES 4.3 %    EOSINOPHILS 1.1 %    BASOPHILS 0.9 %   Comp Metabolic Panel (14)   Result Value Ref Range    GLUCOSE 86 65 - 99 mg/dL    UREA NITROGEN (BUN) 8 7 - 25 mg/dL    CREATININE 0.74 0.50 - 1.03 mg/dL    EGFR 94 > OR = 60 mL/min/1.73m2    BUN/CREATININE RATIO SEE NOTE: 6 - 22 (calc)    SODIUM 138 135 - 146 mmol/L    POTASSIUM 3.6 3.5 - 5.3 mmol/L    CHLORIDE 102 98 - 110 mmol/L    CARBON DIOXIDE 26 20 - 32 mmol/L    CALCIUM 9.5 8.6 - 10.4 mg/dL    PROTEIN, TOTAL 6.6 6.1 - 8.1 g/dL    ALBUMIN 4.2 3.6 - 5.1 g/dL    GLOBULIN 2.4 1.9 - 3.7 g/dL (calc)    ALBUMIN/GLOBULIN RATIO 1.8 1.0 - 2.5 (calc)    BILIRUBIN, TOTAL 1.5 (H) 0.2 - 1.2 mg/dL    ALKALINE PHOSPHATASE 92 37 - 153 U/L    AST 53 (H) 10 - 35 U/L    ALT 32 (H) 6 - 29 U/L   TSH and Free T4   Result Value Ref Range    TSH 2.98 0.40 - 4.50 mIU/L    T4, FREE 1.1 0.8 - 1.8 ng/dL   Lipid Panel   Result Value Ref Range    CHOLESTEROL, TOTAL 272 (H) <200 mg/dL    HDL CHOLESTEROL 57 > OR = 50 mg/dL    TRIGLYCERIDES 160 (H) <150 mg/dL    LDL-CHOLESTEROL 184 (H) mg/dL (calc)    CHOL/HDLC RATIO 4.8 <5.0 (calc)    NON-HDL CHOLESTEROL 215 (H) <130 mg/dL (calc)   Hemoglobin A1C   Result Value Ref Range    HEMOGLOBIN A1c 5.3 <5.7 % of total Hgb   Vitamin B12 [E]   Result Value Ref Range    VITAMIN B12 564 200 - 1,100 pg/mL   Folic Acid Serum [E]   Result Value Ref Range    FOLATE, SERUM 7.9 ng/mL   Ferritin [E]   Result Value Ref Range    FERRITIN 345 (H) 16 - 232 ng/mL        Current Outpatient Medications    Medication Sig Dispense Refill    phenazopyridine 100 MG Oral Tab Take 1 tablet (100 mg total) by mouth 3 (three) times daily.      losartan 50 MG Oral Tab Take 1.5 tablets (75 mg total) by mouth daily. 135 tablet 0    HYDROXYZINE 10 MG Oral Tab TAKE 1-2 TABLETS (10-20 MG TOTAL) BY MOUTH EVERY 8 (EIGHT) HOURS AS NEEDED FOR ANXIETY. 180 tablet 0    traZODone 100 MG Oral Tab Take 1.5 tablets (150 mg total) by mouth nightly. 135 tablet 1    OMEPRAZOLE 20 MG Oral Capsule Delayed Release TAKE ONE CAPSULE (20 MG TOTAL) BY MOUTH ONCE DAILY, 30 MINUTES PRIOR TO BREAKFAST. 90 capsule 0    MELOXICAM 7.5 MG Oral Tab TAKE 1 TABLET (7.5 MG TOTAL) BY MOUTH IN THE MORNING (Patient not taking: Reported on 8/30/2023) 20 tablet 0    Probiotic Product (PROBIOTIC PEARLS) Oral Cap Take 1 capsule by mouth daily.      meclizine 25 MG Oral Tab Take 1 tablet (25 mg total) by mouth 3 (three) times daily. (Patient not taking: Reported on 2/14/2024)      BUSPIRONE 5 MG Oral Tab TAKE 1-3 TABLETS (5-15 MG TOTAL) BY MOUTH 3 (THREE) TIMES A WEEK. (Patient not taking: Reported on 2/14/2024) 180 tablet 0      Past Medical History:   Diagnosis Date    Acute sinusitis, unspecified     Anxiety     Anxiety state, unspecified     Bloating     Depression     Diverticulosis of large intestine     Esophageal reflux     Esophagitis 11/14/2013    LA Grade B esophagitis    Essential hypertension     Flatulence/gas pain/belching     Gastritis 11/14/2013    Heartburn Years ago    Hemorrhoids     Hiatal hernia 11/14/2013    History of mental disorder     Depression and anxiety    Infective otitis externa, unspecified 11/30/10    Internal hemorrhoids 11/14/2013    Nonspecific colitis 11/14/2013    mild, non-specific colitis, possibly prep induced    Other disorder of menstruation and other abnormal bleeding from female genital tract     Sleep disturbance     Wears glasses       Past Surgical History:   Procedure Laterality Date    CHOLECYSTECTOMY       COLONOSCOPY      COLONOSCOPY      COLONOSCOPY & POLYPECTOMY  11/14/2013    Dr. Gil; Due 11/14/2018    OTHER  24 years ago    trauma to right leg    UPPER GI ENDOSCOPY,BIOPSY  11/14/2013    Dr. Gil; Due 5/14/2014      Family History   Problem Relation Age of Onset    Diabetes Father     Hypertension Father     Stroke Father         Mild stroke in Dec 2021    Hypertension Mother     Gastro-Intestinal Disorder Mother         Crohns    Crohn's Disease Mother     Cancer Maternal Grandmother         ovarian cancer    Gastro-Intestinal Disorder Sister         diverticulitis    Cancer Sister         cervical cancer    Colon Cancer Maternal Grandfather       Social History:   Social History     Socioeconomic History    Marital status:    Tobacco Use    Smoking status: Every Day     Packs/day: 0.50     Years: 29.00     Additional pack years: 0.00     Total pack years: 14.50     Types: Cigarettes    Smokeless tobacco: Never   Vaping Use    Vaping Use: Never used   Substance and Sexual Activity    Alcohol use: Not Currently     Comment: None currently     Drug use: Never   Other Topics Concern    Caffeine Concern No    Stress Concern No    Weight Concern No    Special Diet No    Exercise No    Seat Belt Yes     Occ: not working. : engaged. Children: 1.   Exercise:  twice per week.  Diet: watches minimally     REVIEW OF SYSTEMS:   GENERAL: denies fevers, weakness, trouble sleeping or weight changes  SKIN: denies any unusual skin lesions or rashes  EYES:denies vision changes   HEENT: denies upper respiratory symptoms  LUNGS: denies cough or shortness of breath with exertion  CHEST:  denies breast changes or pain  CARDIOVASCULAR: denies chest pain or tightness on exertion: no edema  VASCULAR: denies leg cramps  GI: denies abdominal pain, bowel movement changes, blood in stool  : denies urinary problems, vaginal discharge or discomfort,    MUSCULOSKELETAL: denies joint pain or stiffness see above  elbow bursitis n  NEURO: denies headaches, tingling or dizziness prior vertigo is resolved  PSYCHE: denies depression or anxiety  HEMATOLOGIC: denies bleeding abnormalities  ENDOCRINE: denies temperature intolerance, polyuria, or excessive sweating.  LYMPHATICS: denies swollen glands      EXAM:   /86   Pulse 88   Temp 97 °F (36.1 °C) (Temporal)   Resp 18   Ht 5' 4\" (1.626 m)   Wt 154 lb (69.9 kg)   SpO2 98%   BMI 26.43 kg/m²   Body mass index is 26.43 kg/m².   GENERAL: well developed, well nourished and in no apparent distress  SKIN: no rashes,no suspicious lesions  HEENT: atraumatic, normocephalic,ears, nose and throat are normal   EYES: PERRLA, EOMI, sclera, conjunctiva are clear.  NECK: supple,no adenopathy,no carotid bruits  CHEST: no chest tenderness  BREAST: symmetrical, no suspicious mass, no nipple dimpling or discharge.  LUNGS: clear to auscultation bilateral, no rales, rhonchi or wheezing  CARDIO: RRR without murmur normal S1S2  ABD:  normal bowel sounds,soft, non tender, no masses, HSM or tenderness  :external labia, introitus and vagina are normal, normal discharge and normal cervix.  Bimanual exam normal no adnexal masses or cervical motion tenderness, PAP was done    MUSCULOSKELETAL: gait moises,l no gross M/S defect.  Tenderness with palpation interdigital between the second and third distal metatarsal no deformity present.  Right elbow 2.5 cm cyst no erythema no tenderness no warmth no signs of infection.  EXTREMITIES: no clubbing, cyanosis, or edema  NEURO: oriented times three, cranial nerves are grossly intact, no gross motor or sensory deficit.    ASSESSMENT AND PLAN:   June Goncalves is a 58 year old female who presents for a complete physical exam.    Encounter Diagnoses   Name Primary?    Well female exam with routine gynecological exam Yes    Primary hypertension     Mixed hyperlipidemia     Olecranon bursitis of right elbow     Fibrosis of liver due to alcohol     Transaminitis      Elevated ferritin     Alcoholic (HCC)     Post-menopausal     Encounter for smoking cessation counseling     Smoking greater than 20 pack years     Encounter for screening for lung cancer     Screening for heart disease     Encounter for screening for cervical cancer     Screening for human papillomavirus (HPV)        Orders Placed This Encounter   Procedures    Lipid Panel    Comp Metabolic Panel (14) [E]    Ferritin [E]    Hpv Dna  High Risk , Thin Prep Collect    ThinPrep PAP Smear       Meds & Refills for this Visit:  Requested Prescriptions     Signed Prescriptions Disp Refills    losartan 50 MG Oral Tab 135 tablet 0     Sig: Take 1.5 tablets (75 mg total) by mouth daily.       Imaging & Consults:  ORTHOPEDIC - INTERNAL  CT CALCIUM SCORING  XR DEXA BONE DENSITOMETRY (CPT=77080)  CT LUNG LD SCREENING(CPT=71271)  1. Well female exam with routine gynecological exam  Pap and pelvic   .    Self breast exam explained. Health maintenance guidance given including vision and dental exams, vitamin D 1,000 iu daily with calcium 500 mg daily.  Lifestyle guidance provided recommended low fat diet and aerobic exercise 30 minutes 3-4 times weekly.  Dermatologist, eye exams annually  Tdap due this year  Flu vaccination encouraged with COVID boosters    Patient is due for mammogram order printed for patient,  CT lung cancer screening, bone density and Ultrafast CT of the heart orders provided today at office visit       2. Primary hypertension  Increase losartan from 50 mg to 75 mg daily  Start exercising healthy diet and low-sodium  - losartan 50 MG Oral Tab; Take 1.5 tablets (75 mg total) by mouth daily.  Dispense: 135 tablet; Refill: 0    3. Mixed hyperlipidemia  With Mediterranean diet and healthy lifestyle no goal of LDL below 100 repeat lipids 4/14/2024  Decrease saturated fats and avoid processed foods.  Tries to avoid fatty foods if eating meat try lean cuts of meat such as chicken and fish (salmon and tuna).  If  able, add sarai seeds, almonds, walnuts, pumpkin seeds, sunflower seeds, beans, whole grain cereals to diet.   Cook with avocado oil, grape seed oil or olive oil. Review the Mediterranean diet on line.    Get Ultrafast CT of the heart if plaque is present LDL goal less than 70  - Lipid Panel; Future  - Lipid Panel    4. Olecranon bursitis of right elbow  No signs of infection patient wants to discuss treatment  - Ortho Referral - In Network    5. Fibrosis of liver due to alcohol  Elevated liver enzymes   elevated ferritin  Alcoholic (HCC)  - Comp Metabolic Panel (14) [E]; Future  - Comp Metabolic Panel (14) [E]  Labs due on around 4/14/2024  - Ferritin [E]  Keep appointment with gastroenterologist as planned patient is at high risk for liver cirrhosis states that she stopped drinking alcohol in December and at that point was only once in a while with a glass of wine  6. Post-menopausal  - XR DEXA BONE DENSITOMETRY (CPT=77080); Future    7. Encounter for smoking cessation counseling  Smoking greater than 20 pack years  Encounter for screening for lung cancer  Lung cancer screening discussed patient agrees with getting screening done.  Smoking cessation reviewed discussed the long-term effects of smoking including heart disease, stroke and cancer  Discussed the various techniques for quitting smoking can try the zip lock technique by reducing by 1 cigarette weekly and only smoking the cigarettes that are in the Ziploc for the day  - CT LUNG LD SCREENING(CPT=71271); Future    8 Screening for heart disease  - CT CALCIUM SCORING; Future    9. Encounter for screening for cervical cancer  - ThinPrep PAP Smear; Future  - ThinPrep PAP Smear    10. Screening for human papillomavirus (HPV)  - Hpv Dna  High Risk , Thin Prep Collect; Future  - Hpv Dna  High Risk , Thin Prep Collect      The patient indicates understanding of these issues and agrees to the plan.  The patient is asked to return  Every 6 months .

## 2024-02-15 LAB — HPV I/H RISK 1 DNA SPEC QL NAA+PROBE: NEGATIVE

## 2024-02-19 LAB
.: NORMAL
.: NORMAL

## 2024-02-21 ENCOUNTER — PATIENT MESSAGE (OUTPATIENT)
Dept: FAMILY MEDICINE CLINIC | Facility: CLINIC | Age: 59
End: 2024-02-21

## 2024-02-21 DIAGNOSIS — F41.1 GENERALIZED ANXIETY DISORDER: ICD-10-CM

## 2024-02-21 RX ORDER — HYDROXYZINE HYDROCHLORIDE 10 MG/1
TABLET, FILM COATED ORAL EVERY 8 HOURS PRN
Qty: 180 TABLET | Refills: 0 | Status: SHIPPED | OUTPATIENT
Start: 2024-02-21

## 2024-02-21 NOTE — TELEPHONE ENCOUNTER
From: June Goncalves  To: Darlyn Carrillo  Sent: 2/21/2024 12:05 PM CST  Subject: Headache     Hi Darlyn,  I know we kind of talked about at my visit. However, if I get a headache what can I take since I have liver disease? My neck still hurts on and off.    Aida

## 2024-02-22 RX ORDER — TIZANIDINE 2 MG/1
TABLET ORAL EVERY 8 HOURS PRN
Qty: 20 TABLET | Refills: 0 | Status: SHIPPED | OUTPATIENT
Start: 2024-02-22

## 2024-03-13 ENCOUNTER — PATIENT MESSAGE (OUTPATIENT)
Dept: FAMILY MEDICINE CLINIC | Facility: CLINIC | Age: 59
End: 2024-03-13

## 2024-03-13 NOTE — TELEPHONE ENCOUNTER
From: June Goncalves  To: Darlyn Lorena  Sent: 3/13/2024 11:39 AM CDT  Subject: Heart scan and mammogram     Hi Darlyn,  My heart scan is scheduled for 3/30 (Nickolas’s is also scheduled for that day). My mammogram is also scheduled for 3/30. Just checking to ensure after my mammogram you also scheduled the ultrasound so they could measure the cyst I’ve had for along time to ensure it’s not growing. You usually do, however I’m just checking. I don’t want to get a call to come back when I can get it all done at once.    Thank you   Aida

## 2024-03-19 DIAGNOSIS — I10 PRIMARY HYPERTENSION: ICD-10-CM

## 2024-03-19 RX ORDER — LOSARTAN POTASSIUM 50 MG/1
50 TABLET ORAL DAILY
Qty: 90 TABLET | Refills: 0 | OUTPATIENT
Start: 2024-03-19

## 2024-03-30 ENCOUNTER — HOSPITAL ENCOUNTER (OUTPATIENT)
Dept: CT IMAGING | Age: 59
Discharge: HOME | End: 2024-03-30
Attending: FAMILY MEDICINE
Payer: COMMERCIAL

## 2024-03-30 ENCOUNTER — HOSPITAL ENCOUNTER (OUTPATIENT)
Dept: MAMMOGRAPHY | Age: 59
Discharge: HOME | End: 2024-03-30
Attending: FAMILY MEDICINE
Payer: COMMERCIAL

## 2024-03-30 DIAGNOSIS — Z12.31 VISIT FOR SCREENING MAMMOGRAM: ICD-10-CM

## 2024-03-30 DIAGNOSIS — Z13.6 SCREENING FOR HEART DISEASE: ICD-10-CM

## 2024-03-30 PROCEDURE — 77067 SCR MAMMO BI INCL CAD: CPT | Performed by: FAMILY MEDICINE

## 2024-03-30 PROCEDURE — 77063 BREAST TOMOSYNTHESIS BI: CPT | Performed by: FAMILY MEDICINE

## 2024-04-01 ENCOUNTER — PATIENT MESSAGE (OUTPATIENT)
Dept: FAMILY MEDICINE CLINIC | Facility: CLINIC | Age: 59
End: 2024-04-01

## 2024-04-01 DIAGNOSIS — R92.333 HETEROGENEOUSLY DENSE TISSUE OF BOTH BREASTS ON MAMMOGRAPHY: Primary | ICD-10-CM

## 2024-04-01 NOTE — TELEPHONE ENCOUNTER
From: June Goncalves  To: Darlyn Carrillo  Sent: 4/1/2024 1:51 PM CDT  Subject: Mammogram     Hi Darlyn,  You responded in MyChart telling me my mammogram was normal and to repeat in 1yr. I received a letter from the radiologist stating I have dense breast. Should I be concerned? I googled it and it states you are at higher risk for breast cancer if your breast are dense.    Aida

## 2024-04-21 DIAGNOSIS — F51.05 INSOMNIA SECONDARY TO ANXIETY: ICD-10-CM

## 2024-04-21 DIAGNOSIS — F41.9 INSOMNIA SECONDARY TO ANXIETY: ICD-10-CM

## 2024-04-22 RX ORDER — TRAZODONE HYDROCHLORIDE 100 MG/1
150 TABLET ORAL NIGHTLY
Qty: 135 TABLET | Refills: 1 | Status: SHIPPED | OUTPATIENT
Start: 2024-04-22

## 2024-05-13 DIAGNOSIS — I10 PRIMARY HYPERTENSION: ICD-10-CM

## 2024-05-13 RX ORDER — LOSARTAN POTASSIUM 50 MG/1
75 TABLET ORAL DAILY
Qty: 135 TABLET | Refills: 0 | Status: SHIPPED | OUTPATIENT
Start: 2024-05-13

## 2024-08-08 DIAGNOSIS — I10 PRIMARY HYPERTENSION: ICD-10-CM

## 2024-08-08 RX ORDER — LOSARTAN POTASSIUM 50 MG/1
75 TABLET ORAL DAILY
Qty: 135 TABLET | Refills: 0 | Status: SHIPPED | OUTPATIENT
Start: 2024-08-08

## 2024-10-26 DIAGNOSIS — F41.9 INSOMNIA SECONDARY TO ANXIETY: ICD-10-CM

## 2024-10-26 DIAGNOSIS — F51.05 INSOMNIA SECONDARY TO ANXIETY: ICD-10-CM

## 2024-10-28 RX ORDER — TRAZODONE HYDROCHLORIDE 100 MG/1
150 TABLET ORAL NIGHTLY
Qty: 135 TABLET | Refills: 0 | Status: SHIPPED | OUTPATIENT
Start: 2024-10-28

## 2024-11-05 DIAGNOSIS — I10 PRIMARY HYPERTENSION: ICD-10-CM

## 2024-11-05 RX ORDER — LOSARTAN POTASSIUM 50 MG/1
75 TABLET ORAL DAILY
Qty: 135 TABLET | Refills: 0 | Status: SHIPPED | OUTPATIENT
Start: 2024-11-05 | End: 2024-11-08

## 2024-11-08 DIAGNOSIS — I10 PRIMARY HYPERTENSION: ICD-10-CM

## 2024-11-11 RX ORDER — LOSARTAN POTASSIUM 50 MG/1
75 TABLET ORAL DAILY
Qty: 135 TABLET | Refills: 0 | Status: SHIPPED | OUTPATIENT
Start: 2024-11-11

## 2024-12-03 DIAGNOSIS — F41.1 GENERALIZED ANXIETY DISORDER: ICD-10-CM

## 2024-12-03 RX ORDER — HYDROXYZINE HYDROCHLORIDE 10 MG/1
TABLET, FILM COATED ORAL EVERY 8 HOURS PRN
Qty: 180 TABLET | Refills: 0 | Status: SHIPPED | OUTPATIENT
Start: 2024-12-03

## 2024-12-30 ENCOUNTER — PATIENT MESSAGE (OUTPATIENT)
Dept: FAMILY MEDICINE CLINIC | Facility: CLINIC | Age: 59
End: 2024-12-30

## 2025-02-02 DIAGNOSIS — F51.05 INSOMNIA SECONDARY TO ANXIETY: ICD-10-CM

## 2025-02-02 DIAGNOSIS — F41.9 INSOMNIA SECONDARY TO ANXIETY: ICD-10-CM

## 2025-02-03 RX ORDER — TRAZODONE HYDROCHLORIDE 100 MG/1
150 TABLET ORAL NIGHTLY
Qty: 135 TABLET | Refills: 0 | Status: SHIPPED | OUTPATIENT
Start: 2025-02-03

## 2025-02-03 NOTE — TELEPHONE ENCOUNTER
Requested Prescriptions     Pending Prescriptions Disp Refills    TRAZODONE 100 MG Oral Tab [Pharmacy Med Name: TRAZODONE 100 MG TABLET] 135 tablet 0     Sig: TAKE 1.5 TABLETS BY MOUTH NIGHTLY.       Last Refill: 10/28/24    Last OV: 2/14/24

## 2025-04-29 ENCOUNTER — TELEPHONE (OUTPATIENT)
Dept: FAMILY MEDICINE CLINIC | Facility: CLINIC | Age: 60
End: 2025-04-29

## 2025-04-29 DIAGNOSIS — M70.21 OLECRANON BURSITIS OF RIGHT ELBOW: Primary | ICD-10-CM

## 2025-04-29 NOTE — TELEPHONE ENCOUNTER
June Goncalves is calling for a referral to:    Specialist Name: Marzena Forrester MD  Specialty: Orthopedic  New Referral vs. Follow-up: New referral (old one )  Reason/Diagnosis: right elbow. It is getting bigger. Also has tingling in fingers.     The patient has been informed that the referral process may take 5 - 7 business days to complete.    Once the referral is approved, the patient will be notified via [employer]. Alternatively, the patient may contact the Referral Department at [phone] to check the status.

## 2025-05-02 DIAGNOSIS — F51.05 INSOMNIA SECONDARY TO ANXIETY: ICD-10-CM

## 2025-05-02 DIAGNOSIS — F41.9 INSOMNIA SECONDARY TO ANXIETY: ICD-10-CM

## 2025-05-02 RX ORDER — TRAZODONE HYDROCHLORIDE 100 MG/1
150 TABLET ORAL NIGHTLY
Qty: 45 TABLET | Refills: 1 | Status: SHIPPED | OUTPATIENT
Start: 2025-05-02

## 2025-05-02 NOTE — TELEPHONE ENCOUNTER
Please have her schedule for complete physical or at least in medication follow-up since last seen over a year ago

## 2025-05-02 NOTE — TELEPHONE ENCOUNTER
Requested Prescriptions     Pending Prescriptions Disp Refills    TRAZODONE 100 MG Oral Tab [Pharmacy Med Name: TRAZODONE 100 MG TABLET] 135 tablet 0     Sig: TAKE 1.5 TABLETS BY MOUTH NIGHTLY.       Last Refill: 2/3/25    Last OV: 2/14/24

## 2025-05-05 DIAGNOSIS — E78.2 MIXED HYPERLIPIDEMIA: ICD-10-CM

## 2025-05-05 DIAGNOSIS — Z13.228 SCREENING FOR ENDOCRINE, NUTRITIONAL, METABOLIC AND IMMUNITY DISORDER: ICD-10-CM

## 2025-05-05 DIAGNOSIS — R79.89 ELEVATED FERRITIN: ICD-10-CM

## 2025-05-05 DIAGNOSIS — I10 PRIMARY HYPERTENSION: Primary | ICD-10-CM

## 2025-05-05 DIAGNOSIS — Z13.29 SCREENING FOR ENDOCRINE, NUTRITIONAL, METABOLIC AND IMMUNITY DISORDER: ICD-10-CM

## 2025-05-05 DIAGNOSIS — Z13.220 ENCOUNTER FOR LIPID SCREENING FOR CARDIOVASCULAR DISEASE: ICD-10-CM

## 2025-05-05 DIAGNOSIS — Z13.6 ENCOUNTER FOR LIPID SCREENING FOR CARDIOVASCULAR DISEASE: ICD-10-CM

## 2025-05-05 DIAGNOSIS — Z13.0 SCREENING FOR ENDOCRINE, NUTRITIONAL, METABOLIC AND IMMUNITY DISORDER: ICD-10-CM

## 2025-05-05 DIAGNOSIS — R71.8 ELEVATED MCV: ICD-10-CM

## 2025-05-05 DIAGNOSIS — Z13.21 SCREENING FOR ENDOCRINE, NUTRITIONAL, METABOLIC AND IMMUNITY DISORDER: ICD-10-CM

## 2025-05-06 ENCOUNTER — PATIENT MESSAGE (OUTPATIENT)
Dept: FAMILY MEDICINE CLINIC | Facility: CLINIC | Age: 60
End: 2025-05-06

## 2025-05-06 DIAGNOSIS — I10 PRIMARY HYPERTENSION: ICD-10-CM

## 2025-05-06 RX ORDER — LOSARTAN POTASSIUM 50 MG/1
75 TABLET ORAL DAILY
Qty: 45 TABLET | Refills: 0 | Status: SHIPPED | OUTPATIENT
Start: 2025-05-06 | End: 2025-06-04

## 2025-05-06 RX ORDER — LOSARTAN POTASSIUM 50 MG/1
75 TABLET ORAL DAILY
Qty: 135 TABLET | Refills: 0 | OUTPATIENT
Start: 2025-05-06

## 2025-06-03 ENCOUNTER — PATIENT MESSAGE (OUTPATIENT)
Dept: FAMILY MEDICINE CLINIC | Facility: CLINIC | Age: 60
End: 2025-06-03

## 2025-06-03 DIAGNOSIS — I10 PRIMARY HYPERTENSION: ICD-10-CM

## 2025-06-04 NOTE — TELEPHONE ENCOUNTER
Requested Prescriptions     Pending Prescriptions Disp Refills    losartan 50 MG Oral Tab 45 tablet 0     Sig: Take 1.5 tablets (75 mg total) by mouth daily.       Last Refill: 5/6/25    Last OV: 2/14/24

## 2025-06-05 RX ORDER — LOSARTAN POTASSIUM 50 MG/1
75 TABLET ORAL DAILY
Qty: 21 TABLET | Refills: 0 | Status: SHIPPED | OUTPATIENT
Start: 2025-06-05 | End: 2025-06-19

## 2025-06-05 NOTE — TELEPHONE ENCOUNTER
Ivette message sent to patient telling her she needed to get her labs done refill was given for only 14 days secondary to lack of labs for over past year.  She is also to call the office to make appointment as soon as possible since she has not been seen for over the past year did recommend that she look into another provider if appointment is not available in the next 1 to 2 months and can consider seeing new provider end of July

## 2025-06-09 ENCOUNTER — PATIENT MESSAGE (OUTPATIENT)
Dept: FAMILY MEDICINE CLINIC | Facility: CLINIC | Age: 60
End: 2025-06-09

## 2025-06-12 ENCOUNTER — PATIENT MESSAGE (OUTPATIENT)
Dept: FAMILY MEDICINE CLINIC | Facility: CLINIC | Age: 60
End: 2025-06-12

## 2025-06-14 ENCOUNTER — HOSPITAL ENCOUNTER (INPATIENT)
Facility: HOSPITAL | Age: 60
LOS: 2 days | Discharge: HOME | End: 2025-06-17
Attending: EMERGENCY MEDICINE | Admitting: INTERNAL MEDICINE
Payer: COMMERCIAL

## 2025-06-14 ENCOUNTER — APPOINTMENT (OUTPATIENT)
Dept: ULTRASOUND IMAGING | Age: 60
End: 2025-06-14
Attending: EMERGENCY MEDICINE
Payer: COMMERCIAL

## 2025-06-14 ENCOUNTER — APPOINTMENT (OUTPATIENT)
Dept: CT IMAGING | Age: 60
End: 2025-06-14
Attending: EMERGENCY MEDICINE
Payer: COMMERCIAL

## 2025-06-14 DIAGNOSIS — K57.92 ACUTE DIVERTICULITIS: Primary | ICD-10-CM

## 2025-06-14 DIAGNOSIS — R74.8 ELEVATED LIVER ENZYMES: ICD-10-CM

## 2025-06-14 DIAGNOSIS — K65.1 INTRA-ABDOMINAL ABSCESS (HCC): ICD-10-CM

## 2025-06-14 DIAGNOSIS — E80.6 HYPERBILIRUBINEMIA: ICD-10-CM

## 2025-06-14 PROBLEM — E87.1 HYPONATREMIA: Status: ACTIVE | Noted: 2025-06-14

## 2025-06-14 PROBLEM — R73.9 HYPERGLYCEMIA: Status: ACTIVE | Noted: 2025-06-14

## 2025-06-14 LAB
ALBUMIN SERPL-MCNC: 4 G/DL (ref 3.2–4.8)
ALBUMIN/GLOB SERPL: 1.5 {RATIO} (ref 1–2)
ALP LIVER SERPL-CCNC: 340 U/L (ref 46–118)
ALT SERPL-CCNC: 81 U/L (ref 10–49)
ANION GAP SERPL CALC-SCNC: 12 MMOL/L (ref 0–18)
AST SERPL-CCNC: 115 U/L (ref ?–34)
BASOPHILS # BLD AUTO: 0.06 X10(3) UL (ref 0–0.2)
BASOPHILS NFR BLD AUTO: 0.6 %
BILIRUB SERPL-MCNC: 3.1 MG/DL (ref 0.3–1.2)
BUN BLD-MCNC: 15 MG/DL (ref 9–23)
CALCIUM BLD-MCNC: 9.1 MG/DL (ref 8.7–10.6)
CHLORIDE SERPL-SCNC: 96 MMOL/L (ref 98–112)
CO2 SERPL-SCNC: 21 MMOL/L (ref 21–32)
CREAT BLD-MCNC: 1.29 MG/DL (ref 0.55–1.02)
EGFRCR SERPLBLD CKD-EPI 2021: 48 ML/MIN/1.73M2 (ref 60–?)
EOSINOPHIL # BLD AUTO: 0.04 X10(3) UL (ref 0–0.7)
EOSINOPHIL NFR BLD AUTO: 0.4 %
ERYTHROCYTE [DISTWIDTH] IN BLOOD BY AUTOMATED COUNT: 11.9 %
GLOBULIN PLAS-MCNC: 2.6 G/DL (ref 2–3.5)
GLUCOSE BLD-MCNC: 122 MG/DL (ref 70–99)
HCT VFR BLD AUTO: 37.2 % (ref 35–48)
HGB BLD-MCNC: 13.7 G/DL (ref 12–16)
IMM GRANULOCYTES # BLD AUTO: 0.09 X10(3) UL (ref 0–1)
IMM GRANULOCYTES NFR BLD: 0.8 %
INR BLD: 1.18 (ref 0.8–1.2)
LACTATE SERPL-SCNC: 1.2 MMOL/L (ref 0.5–2)
LIPASE SERPL-CCNC: 74 U/L (ref 12–53)
LYMPHOCYTES # BLD AUTO: 1.39 X10(3) UL (ref 1–4)
LYMPHOCYTES NFR BLD AUTO: 13.1 %
MCH RBC QN AUTO: 38.6 PG (ref 26–34)
MCHC RBC AUTO-ENTMCNC: 36.8 G/DL (ref 31–37)
MCV RBC AUTO: 104.8 FL (ref 80–100)
MONOCYTES # BLD AUTO: 1.15 X10(3) UL (ref 0.1–1)
MONOCYTES NFR BLD AUTO: 10.8 %
NEUTROPHILS # BLD AUTO: 7.88 X10 (3) UL (ref 1.5–7.7)
NEUTROPHILS # BLD AUTO: 7.88 X10(3) UL (ref 1.5–7.7)
NEUTROPHILS NFR BLD AUTO: 74.3 %
OSMOLALITY SERPL CALC.SUM OF ELEC: 270 MOSM/KG (ref 275–295)
PLATELET # BLD AUTO: 220 10(3)UL (ref 150–450)
POTASSIUM SERPL-SCNC: 3.6 MMOL/L (ref 3.5–5.1)
PROT SERPL-MCNC: 6.6 G/DL (ref 5.7–8.2)
PROTHROMBIN TIME: 14.8 SECONDS (ref 11.6–14.8)
RBC # BLD AUTO: 3.55 X10(6)UL (ref 3.8–5.3)
SODIUM SERPL-SCNC: 129 MMOL/L (ref 136–145)
WBC # BLD AUTO: 10.6 X10(3) UL (ref 4–11)

## 2025-06-14 PROCEDURE — 74177 CT ABD & PELVIS W/CONTRAST: CPT | Performed by: EMERGENCY MEDICINE

## 2025-06-14 PROCEDURE — 76856 US EXAM PELVIC COMPLETE: CPT | Performed by: EMERGENCY MEDICINE

## 2025-06-14 PROCEDURE — 99223 1ST HOSP IP/OBS HIGH 75: CPT | Performed by: HOSPITALIST

## 2025-06-14 PROCEDURE — 76830 TRANSVAGINAL US NON-OB: CPT | Performed by: EMERGENCY MEDICINE

## 2025-06-14 RX ORDER — ONDANSETRON 2 MG/ML
4 INJECTION INTRAMUSCULAR; INTRAVENOUS ONCE
Status: COMPLETED | OUTPATIENT
Start: 2025-06-14 | End: 2025-06-14

## 2025-06-14 RX ORDER — MORPHINE SULFATE 4 MG/ML
4 INJECTION, SOLUTION INTRAMUSCULAR; INTRAVENOUS ONCE
Status: COMPLETED | OUTPATIENT
Start: 2025-06-14 | End: 2025-06-14

## 2025-06-14 NOTE — ED INITIAL ASSESSMENT (HPI)
Pt c/o LLQ abdominal pain since Monday.  Pt has hx of diverticulitis, given abx Tuesday but pain persists

## 2025-06-15 ENCOUNTER — APPOINTMENT (OUTPATIENT)
Dept: MRI IMAGING | Facility: HOSPITAL | Age: 60
End: 2025-06-15
Attending: HOSPITALIST
Payer: COMMERCIAL

## 2025-06-15 PROBLEM — E80.6 HYPERBILIRUBINEMIA: Status: ACTIVE | Noted: 2025-06-15

## 2025-06-15 PROBLEM — R74.8 ELEVATED LIVER ENZYMES: Status: ACTIVE | Noted: 2025-06-15

## 2025-06-15 PROBLEM — K65.1 INTRA-ABDOMINAL ABSCESS (HCC): Status: ACTIVE | Noted: 2025-06-15

## 2025-06-15 LAB
ALBUMIN SERPL-MCNC: 3.3 G/DL (ref 3.2–4.8)
ALP LIVER SERPL-CCNC: 249 U/L (ref 46–118)
ALT SERPL-CCNC: 63 U/L (ref 10–49)
ANION GAP SERPL CALC-SCNC: 10 MMOL/L (ref 0–18)
AST SERPL-CCNC: 86 U/L (ref ?–34)
BASOPHILS # BLD AUTO: 0.05 X10(3) UL (ref 0–0.2)
BASOPHILS NFR BLD AUTO: 0.9 %
BILIRUB DIRECT SERPL-MCNC: 1.5 MG/DL (ref ?–0.3)
BILIRUB SERPL-MCNC: 2.3 MG/DL (ref 0.3–1.2)
BUN BLD-MCNC: 13 MG/DL (ref 9–23)
C DIFF TOX B STL QL: NEGATIVE
CALCIUM BLD-MCNC: 7.7 MG/DL (ref 8.7–10.6)
CHLORIDE SERPL-SCNC: 102 MMOL/L (ref 98–112)
CO2 SERPL-SCNC: 22 MMOL/L (ref 21–32)
CREAT BLD-MCNC: 1.21 MG/DL (ref 0.55–1.02)
EGFRCR SERPLBLD CKD-EPI 2021: 52 ML/MIN/1.73M2 (ref 60–?)
EOSINOPHIL # BLD AUTO: 0.08 X10(3) UL (ref 0–0.7)
EOSINOPHIL NFR BLD AUTO: 1.4 %
ERYTHROCYTE [DISTWIDTH] IN BLOOD BY AUTOMATED COUNT: 12 %
GLUCOSE BLD-MCNC: 101 MG/DL (ref 70–99)
HCT VFR BLD AUTO: 30 % (ref 35–48)
HGB BLD-MCNC: 10.8 G/DL (ref 12–16)
IMM GRANULOCYTES # BLD AUTO: 0.05 X10(3) UL (ref 0–1)
IMM GRANULOCYTES NFR BLD: 0.9 %
LYMPHOCYTES # BLD AUTO: 1.23 X10(3) UL (ref 1–4)
LYMPHOCYTES NFR BLD AUTO: 20.9 %
MCH RBC QN AUTO: 37.6 PG (ref 26–34)
MCHC RBC AUTO-ENTMCNC: 36 G/DL (ref 31–37)
MCV RBC AUTO: 104.5 FL (ref 80–100)
MONOCYTES # BLD AUTO: 0.66 X10(3) UL (ref 0.1–1)
MONOCYTES NFR BLD AUTO: 11.2 %
NEUTROPHILS # BLD AUTO: 3.81 X10 (3) UL (ref 1.5–7.7)
NEUTROPHILS # BLD AUTO: 3.81 X10(3) UL (ref 1.5–7.7)
NEUTROPHILS NFR BLD AUTO: 64.7 %
OSMOLALITY SERPL CALC.SUM OF ELEC: 278 MOSM/KG (ref 275–295)
PLATELET # BLD AUTO: 162 10(3)UL (ref 150–450)
POTASSIUM SERPL-SCNC: 3.1 MMOL/L (ref 3.5–5.1)
PROT SERPL-MCNC: 5.5 G/DL (ref 5.7–8.2)
RBC # BLD AUTO: 2.87 X10(6)UL (ref 3.8–5.3)
SODIUM SERPL-SCNC: 134 MMOL/L (ref 136–145)
WBC # BLD AUTO: 5.9 X10(3) UL (ref 4–11)

## 2025-06-15 PROCEDURE — 99232 SBSQ HOSP IP/OBS MODERATE 35: CPT | Performed by: HOSPITALIST

## 2025-06-15 PROCEDURE — 74183 MRI ABD W/O CNTR FLWD CNTR: CPT | Performed by: HOSPITALIST

## 2025-06-15 PROCEDURE — 72197 MRI PELVIS W/O & W/DYE: CPT | Performed by: HOSPITALIST

## 2025-06-15 PROCEDURE — 99221 1ST HOSP IP/OBS SF/LOW 40: CPT | Performed by: STUDENT IN AN ORGANIZED HEALTH CARE EDUCATION/TRAINING PROGRAM

## 2025-06-15 RX ORDER — SODIUM PHOSPHATE, DIBASIC AND SODIUM PHOSPHATE, MONOBASIC 7; 19 G/230ML; G/230ML
1 ENEMA RECTAL ONCE AS NEEDED
Status: DISCONTINUED | OUTPATIENT
Start: 2025-06-15 | End: 2025-06-17

## 2025-06-15 RX ORDER — PROCHLORPERAZINE EDISYLATE 5 MG/ML
5 INJECTION INTRAMUSCULAR; INTRAVENOUS EVERY 8 HOURS PRN
Status: DISCONTINUED | OUTPATIENT
Start: 2025-06-15 | End: 2025-06-17

## 2025-06-15 RX ORDER — ACETAMINOPHEN 500 MG
500 TABLET ORAL EVERY 4 HOURS PRN
Status: DISCONTINUED | OUTPATIENT
Start: 2025-06-15 | End: 2025-06-17

## 2025-06-15 RX ORDER — GADOTERATE MEGLUMINE 376.9 MG/ML
20 INJECTION INTRAVENOUS
Status: COMPLETED | OUTPATIENT
Start: 2025-06-15 | End: 2025-06-15

## 2025-06-15 RX ORDER — BISACODYL 10 MG
10 SUPPOSITORY, RECTAL RECTAL
Status: DISCONTINUED | OUTPATIENT
Start: 2025-06-15 | End: 2025-06-17

## 2025-06-15 RX ORDER — POTASSIUM CHLORIDE 1500 MG/1
40 TABLET, EXTENDED RELEASE ORAL ONCE
Status: COMPLETED | OUTPATIENT
Start: 2025-06-15 | End: 2025-06-15

## 2025-06-15 RX ORDER — HYDROCODONE BITARTRATE AND ACETAMINOPHEN 5; 325 MG/1; MG/1
2 TABLET ORAL EVERY 4 HOURS PRN
Status: DISCONTINUED | OUTPATIENT
Start: 2025-06-15 | End: 2025-06-17

## 2025-06-15 RX ORDER — HYDROCODONE BITARTRATE AND ACETAMINOPHEN 5; 325 MG/1; MG/1
1 TABLET ORAL EVERY 4 HOURS PRN
Status: DISCONTINUED | OUTPATIENT
Start: 2025-06-15 | End: 2025-06-17

## 2025-06-15 RX ORDER — MORPHINE SULFATE 2 MG/ML
2 INJECTION, SOLUTION INTRAMUSCULAR; INTRAVENOUS EVERY 2 HOUR PRN
Status: DISCONTINUED | OUTPATIENT
Start: 2025-06-15 | End: 2025-06-17

## 2025-06-15 RX ORDER — SODIUM CHLORIDE 9 MG/ML
INJECTION, SOLUTION INTRAVENOUS CONTINUOUS
Status: DISCONTINUED | OUTPATIENT
Start: 2025-06-15 | End: 2025-06-17

## 2025-06-15 RX ORDER — POLYETHYLENE GLYCOL 3350 17 G/17G
17 POWDER, FOR SOLUTION ORAL DAILY PRN
Status: DISCONTINUED | OUTPATIENT
Start: 2025-06-15 | End: 2025-06-17

## 2025-06-15 RX ORDER — SENNOSIDES 8.6 MG
17.2 TABLET ORAL NIGHTLY PRN
Status: DISCONTINUED | OUTPATIENT
Start: 2025-06-15 | End: 2025-06-17

## 2025-06-15 RX ORDER — MORPHINE SULFATE 4 MG/ML
4 INJECTION, SOLUTION INTRAMUSCULAR; INTRAVENOUS EVERY 2 HOUR PRN
Status: DISCONTINUED | OUTPATIENT
Start: 2025-06-15 | End: 2025-06-17

## 2025-06-15 RX ORDER — ACETAMINOPHEN 325 MG/1
650 TABLET ORAL EVERY 4 HOURS PRN
Status: DISCONTINUED | OUTPATIENT
Start: 2025-06-15 | End: 2025-06-17

## 2025-06-15 RX ORDER — ONDANSETRON 2 MG/ML
4 INJECTION INTRAMUSCULAR; INTRAVENOUS EVERY 6 HOURS PRN
Status: DISCONTINUED | OUTPATIENT
Start: 2025-06-15 | End: 2025-06-17

## 2025-06-15 RX ORDER — HYDROXYZINE HYDROCHLORIDE 10 MG/1
TABLET, FILM COATED ORAL EVERY 8 HOURS PRN
Status: DISCONTINUED | OUTPATIENT
Start: 2025-06-15 | End: 2025-06-17

## 2025-06-15 RX ORDER — NICOTINE 21 MG/24HR
1 PATCH, TRANSDERMAL 24 HOURS TRANSDERMAL DAILY
Status: DISCONTINUED | OUTPATIENT
Start: 2025-06-15 | End: 2025-06-17

## 2025-06-15 RX ORDER — MORPHINE SULFATE 2 MG/ML
1 INJECTION, SOLUTION INTRAMUSCULAR; INTRAVENOUS EVERY 2 HOUR PRN
Status: DISCONTINUED | OUTPATIENT
Start: 2025-06-15 | End: 2025-06-17

## 2025-06-15 NOTE — ED QUICK NOTES
Pt left ER via private car,  is . IV intact and wrapped in coban. Liliya ESCALANTE informed of pt departure, ok to send pt.pt rec'd half of IV sepsis bolus, lactic was 1.2. pt aox4, ambulatory with a steady gait. Vss. Nad.

## 2025-06-15 NOTE — PLAN OF CARE
Pt arrived on unit at 0115 from [place] ED accompanied by spouse admitted for acute diverticulitis. Pt is A&O x 4. Pt's lungs are clear, breathing unlabored. Vital signs are stable. Pt's diet is NPO, sips with meds. Pt is continent of bladder and bowel. Pt ambulates with standby assist. Skin admission assessment was performed with Ruth and revealed IV site on L AC and no noted wounds. Pt is good historian. Admission navigator completed, med reconciliation reviewed. POC discussed with pt who verbalized understanding. NOC safety plan in place, bed in low position, call light and personal items within reach, pt encouraged to call for assistance.    POC:  Blood cx, ua pending  NPO/IVF/Zosyn  Pain control

## 2025-06-15 NOTE — PLAN OF CARE
Patient is alert and oriented. On room air. Hypotensive. Total of 1500 fluid bolus given. Recent B/P 88/56. Patient denies symptoms. Abdomen is soft/ non-distended. Patient denies nausea. Patient states passing gas and had a soft BM this morning. Receiving IVF. Up with SB. Dr. Azar aware of OB consult-please reconsult if ovarian or fallopian tubes are involved after MRI results. MRI checklist completed.

## 2025-06-15 NOTE — ED QUICK NOTES
Pt stated its been more than 10 days since drinking any alcohol. CIWA zero. Pt denies any symptoms of withdrawal.

## 2025-06-15 NOTE — ED QUICK NOTES
Pt doesn't want to be transported to hospital via ambulance and wants her  to drive her. Will get ambulance refusal.    Writer spoke with patient.  Explained that he cannot eat pie while on bipap or drink water. That it would cause him to have worsened breathing problems.  He states that he feels that he is dying.  That he isn't going to survive this hospitalization.  When asked why, he listed his difficulty breathing, weakness in legs.  We discussed how frustrating his condition must be.  He explained that he can't help around the house anymore.  Writer asked how long his breathing has been this bad-pt stated 4 years.  When asked how long he has felt this way about his quality of life he said 5 years. Writer pointed out that was before his breathing was bad.  Pt stated he knows.  States he isn't happy with his life right now.  Writer asked if he has spoken to his wife about this, pt stated no.  She just argues with him.  Will let on coming shift know.    0300-pt states once again that he feels like he is going to die.  Sats are good on 2 LPM NC, dyspneic, but not worse than earlier.  States he is done and just wants to die.  Will continue to monitor and encourage pt to have discussion with Wife and Doctor on days.

## 2025-06-15 NOTE — H&P
[facility]  History and Physical     June Goncalves Patient Status:  Emergency    10/12/1965 MRN [id number]   Location [facility] [place] Attending Uriel Yarbrough MD   Hosp Day # 0 PCP Karrie Orta DO     Chief Complaint: Abdominal pain    Subjective:    History of Present Illness:     June Goncalves is a 59 year old female with hx of hypertension, anxiety presents to the ER with lower abdominal pain that started a week ago. Pt went to a [facility] 6 days ago and was started on Augmentin. Pt reports that the pain did not improve. No fevers, chills, nausea, vomiting, or diarrhea. No hmeatochezia, melena or hematuria. No chest pain, SOB, palpitations.    History/Other:    Past Medical History:  Past Medical History[1]  Past Surgical History:   Past Surgical History[2]   Family History:   Family History[3]  Social History:    reports that she has been smoking cigarettes. She has a 14.5 pack-year smoking history. She has never used smokeless tobacco. She reports that she does not currently use alcohol. She reports that she does not use drugs.     Allergies: Allergies[4]    Medications:  Medications Ordered Prior to Encounter[5]    Review of Systems:   A comprehensive review of systems was completed.    Pertinent positives and negatives noted in the HPI.    Objective:   Physical Exam:    BP 90/60   Pulse 91   Temp 98 °F (36.7 °C) (Oral)   Resp 15   Ht 5' 4\" (1.626 m)   Wt 159 lb (72.1 kg)   SpO2 96%   BMI 27.29 kg/m²   General: No acute distress, Alert  Respiratory: No rhonchi, no wheezes  Cardiovascular: S1, S2. Regular rate and rhythm  Abdomen: Soft, Non-tender, non-distended, positive bowel sounds  Neuro: No new focal deficits  Extremities: No edema      Results:    Labs:      Labs Last 24 Hours:    Recent Labs   Lab 25  2046   RBC 3.55*   HGB 13.7   HCT 37.2   .8*   MCH 38.6*   MCHC 36.8   RDW 11.9   NEPRELIM 7.88*   WBC 10.6   .0       Recent Labs    Lab 06/14/25 2046   *   BUN 15   CREATSERUM 1.29*   EGFRCR 48*   CA 9.1   ALB 4.0   *   K 3.6   CL 96*   CO2 21.0   ALKPHO 340*   *   ALT 81*   BILT 3.1*   TP 6.6       Estimated Glomerular Filtration Rate: 48 mL/min/1.73m2 (A) (result from lab).    Lab Results   Component Value Date    PT 10.7 09/30/2022    PT 11.7 (H) 07/29/2021    INR 1.1 09/30/2022    INR 1.1 09/30/2022    INR 1.2 (H) 07/29/2021       No results for input(s): \"TROP\", \"TROPHS\", \"CK\" in the last 168 hours.    No results for input(s): \"TROP\", \"PBNP\" in the last 168 hours.    No results for input(s): \"PCT\" in the last 168 hours.    Imaging: Imaging data reviewed in Epic.    Assessment & Plan:      # Left adnexal mass  - -possible abscess  - Will continue on zosyn  - General surgery on consult    # Hypertension  - Losartan    # Anxiety  - Will continue on hydroxyzine.    # GERD  - will continue on PPI.         Plan of care discussed with patient at bedside.    Ayo Serrato, DO    Supplementary Documentation:     The 21st Century Cures Act makes medical notes like these available to patients in the interest of transparency. Please be advised this is a medical document. Medical documents are intended to carry relevant information, facts as evident, and the clinical opinion of the practitioner. The medical note is intended as peer to peer communication and may appear blunt or direct. It is written in medical language and may contain abbreviations or verbiage that are unfamiliar.                                     [1]   Past Medical History:   Acute sinusitis, unspecified    Anxiety    Anxiety state, unspecified    Bloating    Depression    Diverticulosis of large intestine    Esophageal reflux    Esophagitis    LA Grade B esophagitis    Essential hypertension    Flatulence/gas pain/belching    Gastritis    Heartburn    Hemorrhoids    Hiatal hernia    History of mental disorder    Depression and anxiety    Infective  otitis externa, unspecified    Internal hemorrhoids    Nonspecific colitis    mild, non-specific colitis, possibly prep induced    Other disorder of menstruation and other abnormal bleeding from female genital tract    Sleep disturbance    Wears glasses   [2]   Past Surgical History:  Procedure Laterality Date    Cholecystectomy      Colonoscopy      Colonoscopy      Colonoscopy & polypectomy  11/14/2013    Dr. Gil; Due 11/14/2018    Other  24 years ago    trauma to right leg    Upper gi endoscopy,biopsy  11/14/2013    Dr. Gil; Due 5/14/2014   [3]   Family History  Problem Relation Age of Onset    Diabetes Father     Hypertension Father     Stroke Father         Mild stroke in Dec 2021    Hypertension Mother     Gastro-Intestinal Disorder Mother         Crohns    Crohn's Disease Mother     Cancer Maternal Grandmother         ovarian cancer    Gastro-Intestinal Disorder Sister         diverticulitis    Cancer Sister         cervical cancer    Colon Cancer Maternal Grandfather    [4] No Known Allergies  [5]   No current facility-administered medications on file prior to encounter.     Current Outpatient Medications on File Prior to Encounter   Medication Sig Dispense Refill    losartan 50 MG Oral Tab Take 1.5 tablets (75 mg total) by mouth daily for 14 days. 21 tablet 0    traZODone 100 MG Oral Tab TAKE 1.5 TABLETS BY MOUTH NIGHTLY. 45 tablet 1    hydrOXYzine 10 MG Oral Tab Take 1-2 tablets (10-20 mg total) by mouth every 8 (eight) hours as needed for Anxiety. 180 tablet 0    OMEPRAZOLE 20 MG Oral Capsule Delayed Release TAKE ONE CAPSULE (20 MG TOTAL) BY MOUTH ONCE DAILY, 30 MINUTES PRIOR TO BREAKFAST. 90 capsule 0    tiZANidine 2 MG Oral Tab Take 1-2 tablets (2-4 mg total) by mouth every 8 (eight) hours as needed (tension headache/ neck muscle spasm). (Patient not taking: Reported on 6/14/2025) 20 tablet 0    meclizine 25 MG Oral Tab Take 1 tablet (25 mg total) by mouth 3 (three) times daily.  (Patient not taking: Reported on 2/14/2024)      phenazopyridine 100 MG Oral Tab Take 1 tablet (100 mg total) by mouth 3 (three) times daily. (Patient not taking: Reported on 6/14/2025)      BUSPIRONE 5 MG Oral Tab TAKE 1-3 TABLETS (5-15 MG TOTAL) BY MOUTH 3 (THREE) TIMES A WEEK. (Patient not taking: Reported on 2/14/2024) 180 tablet 0    Probiotic Product (PROBIOTIC PEARLS) Oral Cap Take 1 capsule by mouth daily.

## 2025-06-15 NOTE — PROGRESS NOTES
[facility]   part of [facility]     Hospitalist Progress Note     June Barronkateryna Patient Status:  Inpatient    10/12/1965 MRN [id number]   Location [facility] 3NW-A Attending Britton Figueroa DO   Hosp Day # 0 PCP Karrie Orta DO     Chief Complaint: Abdominal pain    Subjective:     Patient seen and examined. Minimal abdominal pain when seen. Denies N/V.     Objective:    Review of Systems:   A comprehensive review of systems was completed; pertinent positive and negatives stated in subjective.    Vital signs:  Temp:  [97.7 °F (36.5 °C)-98.2 °F (36.8 °C)] 97.8 °F (36.6 °C)  Pulse:  [] 94  Resp:  [15-19] 18  BP: ()/(53-77) 90/59  SpO2:  [92 %-99 %] 96 %    Physical Exam:    General: No acute distress  Respiratory: No wheezes, no rhonchi  Cardiovascular: S1, S2, regular rate and rhythm  Abdomen: Soft, Non-tender, non-distended, positive bowel sounds  Neuro: No new focal deficits.   Extremities: No edema    Diagnostic Data:    Labs:  Recent Labs   Lab 06/14/25  2046 06/14/25  2234 06/15/25  0522   WBC 10.6  --  5.9   HGB 13.7  --  10.8*   .8*  --  104.5*   .0  --  162.0   INR  --  1.18  --        Recent Labs   Lab 06/14/25  2046 06/15/25  0522   * 101*   BUN 15 13   CREATSERUM 1.29* 1.21*   CA 9.1 7.7*   ALB 4.0  --    * 134*   K 3.6 3.1*   CL 96* 102   CO2 21.0 22.0   ALKPHO 340*  --    *  --    ALT 81*  --    BILT 3.1*  --    TP 6.6  --        Estimated Glomerular Filtration Rate: 52 mL/min/1.73m2 (A) (result from lab).    No results for input(s): \"TROP\", \"TROPHS\", \"CK\" in the last 168 hours.    Recent Labs   Lab 25   PTP 14.8   INR 1.18                  Microbiology    No results found for this visit on 25.      Imaging: Reviewed in Epic.    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 Goncalves is expected to be discharge to: home     The 21st Century Cures Act makes medical notes like these available to patients in the interest of transparency. Please be advised this is a medical document. Medical documents are intended to carry relevant information, facts as evident, and the clinical opinion of the practitioner. The medical note is intended as peer to peer communication and may appear blunt or direct. It is written in medical language and may contain abbreviations or verbiage that are unfamiliar.                         [1]    traZODone  150 mg Oral Nightly    piperacillin-tazobactam  3.375 g Intravenous Q8H    nicotine  1 patch Transdermal Daily

## 2025-06-16 LAB
AFP-TM SERPL-MCNC: 5.6 NG/ML (ref ?–8)
ALBUMIN SERPL-MCNC: 3.2 G/DL (ref 3.2–4.8)
ALBUMIN SERPL-MCNC: 3.5 G/DL (ref 3.2–4.8)
ALBUMIN/GLOB SERPL: 1.5 {RATIO} (ref 1–2)
ALBUMIN/GLOB SERPL: 1.5 {RATIO} (ref 1–2)
ALP LIVER SERPL-CCNC: 245 U/L (ref 46–118)
ALP LIVER SERPL-CCNC: 249 U/L (ref 46–118)
ALT SERPL-CCNC: 64 U/L (ref 10–49)
ALT SERPL-CCNC: 67 U/L (ref 10–49)
ANION GAP SERPL CALC-SCNC: 10 MMOL/L (ref 0–18)
ANION GAP SERPL CALC-SCNC: 10 MMOL/L (ref 0–18)
AST SERPL-CCNC: 90 U/L (ref ?–34)
AST SERPL-CCNC: 96 U/L (ref ?–34)
BASOPHILS # BLD AUTO: 0.05 X10(3) UL (ref 0–0.2)
BASOPHILS NFR BLD AUTO: 0.9 %
BILIRUB SERPL-MCNC: 1.9 MG/DL (ref 0.3–1.2)
BILIRUB SERPL-MCNC: 2.2 MG/DL (ref 0.3–1.2)
BUN BLD-MCNC: 7 MG/DL (ref 9–23)
BUN BLD-MCNC: 8 MG/DL (ref 9–23)
CALCIUM BLD-MCNC: 7.8 MG/DL (ref 8.7–10.6)
CALCIUM BLD-MCNC: 7.8 MG/DL (ref 8.7–10.6)
CANCER AG125 SERPL-ACNC: 114 U/ML (ref ?–30.2)
CEA SERPL-MCNC: 3.2 NG/ML (ref ?–5)
CHLORIDE SERPL-SCNC: 109 MMOL/L (ref 98–112)
CHLORIDE SERPL-SCNC: 109 MMOL/L (ref 98–112)
CO2 SERPL-SCNC: 19 MMOL/L (ref 21–32)
CO2 SERPL-SCNC: 21 MMOL/L (ref 21–32)
CREAT BLD-MCNC: 0.85 MG/DL (ref 0.55–1.02)
CREAT BLD-MCNC: 0.86 MG/DL (ref 0.55–1.02)
DEPRECATED HBV CORE AB SER IA-ACNC: 1238 NG/ML (ref 50–306)
EGFRCR SERPLBLD CKD-EPI 2021: 78 ML/MIN/1.73M2 (ref 60–?)
EGFRCR SERPLBLD CKD-EPI 2021: 79 ML/MIN/1.73M2 (ref 60–?)
EOSINOPHIL # BLD AUTO: 0.06 X10(3) UL (ref 0–0.7)
EOSINOPHIL NFR BLD AUTO: 1.1 %
ERYTHROCYTE [DISTWIDTH] IN BLOOD BY AUTOMATED COUNT: 12.3 %
FOLATE SERPL-MCNC: 5.6 NG/ML (ref 5.4–?)
GLOBULIN PLAS-MCNC: 2.2 G/DL (ref 2–3.5)
GLOBULIN PLAS-MCNC: 2.3 G/DL (ref 2–3.5)
GLUCOSE BLD-MCNC: 171 MG/DL (ref 70–99)
GLUCOSE BLD-MCNC: 95 MG/DL (ref 70–99)
HBV CORE AB SERPL QL IA: NONREACTIVE
HBV SURFACE AB SER QL: NONREACTIVE
HBV SURFACE AB SERPL IA-ACNC: <3.1 MIU/ML
HBV SURFACE AG SER-ACNC: <0.1 [IU]/L
HBV SURFACE AG SERPL QL IA: NONREACTIVE
HCT VFR BLD AUTO: 30.8 % (ref 35–48)
HCV AB SERPL QL IA: NONREACTIVE
HGB BLD-MCNC: 11.1 G/DL (ref 12–16)
IMM GRANULOCYTES # BLD AUTO: 0.03 X10(3) UL (ref 0–1)
IMM GRANULOCYTES NFR BLD: 0.5 %
IMMUNOGLOBULIN PNL SER-MCNC: 649 MG/DL (ref 650–1600)
INR BLD: 1.33 (ref 0.8–1.2)
IRON SATN MFR SERPL: 31 % (ref 15–50)
IRON SERPL-MCNC: 53 UG/DL (ref 50–170)
LYMPHOCYTES # BLD AUTO: 1.13 X10(3) UL (ref 1–4)
LYMPHOCYTES NFR BLD AUTO: 20.4 %
MCH RBC QN AUTO: 38 PG (ref 26–34)
MCHC RBC AUTO-ENTMCNC: 36 G/DL (ref 31–37)
MCV RBC AUTO: 105.5 FL (ref 80–100)
MONOCYTES # BLD AUTO: 0.51 X10(3) UL (ref 0.1–1)
MONOCYTES NFR BLD AUTO: 9.2 %
NEUTROPHILS # BLD AUTO: 3.77 X10 (3) UL (ref 1.5–7.7)
NEUTROPHILS # BLD AUTO: 3.77 X10(3) UL (ref 1.5–7.7)
NEUTROPHILS NFR BLD AUTO: 67.9 %
OSMOLALITY SERPL CALC.SUM OF ELEC: 288 MOSM/KG (ref 275–295)
OSMOLALITY SERPL CALC.SUM OF ELEC: 288 MOSM/KG (ref 275–295)
PLATELET # BLD AUTO: 153 10(3)UL (ref 150–450)
POTASSIUM SERPL-SCNC: 3.4 MMOL/L (ref 3.5–5.1)
POTASSIUM SERPL-SCNC: 4 MMOL/L (ref 3.5–5.1)
POTASSIUM SERPL-SCNC: 4 MMOL/L (ref 3.5–5.1)
PROT SERPL-MCNC: 5.4 G/DL (ref 5.7–8.2)
PROT SERPL-MCNC: 5.8 G/DL (ref 5.7–8.2)
PROTHROMBIN TIME: 16.6 SECONDS (ref 11.6–14.8)
RBC # BLD AUTO: 2.92 X10(6)UL (ref 3.8–5.3)
SODIUM SERPL-SCNC: 138 MMOL/L (ref 136–145)
SODIUM SERPL-SCNC: 140 MMOL/L (ref 136–145)
TOTAL IRON BINDING CAPACITY: 171 UG/DL (ref 250–425)
TRANSFERRIN SERPL-MCNC: 109 MG/DL (ref 250–380)
VIT B12 SERPL-MCNC: >2000 PG/ML (ref 211–911)
WBC # BLD AUTO: 5.6 X10(3) UL (ref 4–11)

## 2025-06-16 PROCEDURE — 99232 SBSQ HOSP IP/OBS MODERATE 35: CPT | Performed by: STUDENT IN AN ORGANIZED HEALTH CARE EDUCATION/TRAINING PROGRAM

## 2025-06-16 PROCEDURE — 99232 SBSQ HOSP IP/OBS MODERATE 35: CPT | Performed by: HOSPITALIST

## 2025-06-16 PROCEDURE — 99222 1ST HOSP IP/OBS MODERATE 55: CPT | Performed by: STUDENT IN AN ORGANIZED HEALTH CARE EDUCATION/TRAINING PROGRAM

## 2025-06-16 RX ORDER — POTASSIUM CHLORIDE 1500 MG/1
40 TABLET, EXTENDED RELEASE ORAL ONCE
Status: COMPLETED | OUTPATIENT
Start: 2025-06-16 | End: 2025-06-16

## 2025-06-16 NOTE — CONSULTS
[facility]  Report of Surgical Consultation with History and Physical Exam    June Goncalves Patient Status:  Inpatient    10/12/1965 MRN [id number]   Location [facility] 3NW-A Attending Britton Figueroa DO   Hosp Day # 0 PCP Karrie Orta DO     Reason for Surgical Consultation: Seeing this patient in consultation at the request of Dr. Troy for diverticulitis    History of Present Illness:  June Goncalves is a a(n) 59 year old female who presented to hospital with worsening abdominal pain.  Patient has history of diverticulitis and began to feel symptoms starting last weekend.  By Monday, she began to have significant left lower quadrant abdominal pain.  She immediately put herself on a clear liquid diet.  During the week, her pain continued to get worse.  She presented to the urgent care and got a course of antibiotics.  Her pain continued to get worse even with antibiotics.  Patient presents to the emergency room for further evaluation.  In the emergency room, she was hemodynamically stable.  Serology showed elevated LFTs with AST over /81, alk phos 340, and total bilirubin 3.1.  Patient did not have a leukocytosis.  CT imaging was performed which showed nodular contour of the liver concerning for cirrhosis and diverticulitis of the sigmoid colon with abscess versus mass of the abutting left adnexa.  General surgery was consulted for further evaluation and management.    On interview, patient reports significant improvement of her abdominal pain.  She has been having diarrhea but denies any nausea or vomiting.  Patient is tolerating clear liquids.  Her abdominal surgeries are significant for a cholecystectomy.  Her last colonoscopy was in  and negative for any findings.  Patient is due for repeat colonoscopy in .      History:  Past Medical History[1]    Past Surgical History[2]    Family History[3]     reports that she has been smoking cigarettes. She has a 14.5 pack-year smoking  history. She has never used smokeless tobacco. She reports that she does not currently use alcohol. She reports that she does not use drugs.      Allergies:  Allergies[4]      Medications:  Current Hospital Medications[5]      Review of Systems:  The review of systems was negative other than the above listed HPI and past medical history including the HEENT, Heart, Lungs, GI, , Neuro, Musculoskeletal, Hematologic, Endocrine and Psych.       Physical Exam:  Blood pressure 91/61, pulse 78, temperature 98.1 °F (36.7 °C), temperature source Oral, resp. rate 16, height 64\", weight 162 lb 4.8 oz (73.6 kg), SpO2 98%, not currently breastfeeding.  General: Alert, orientated x3.  Cooperative.  No apparent distress.  HEENT: Exam is unremarkable.  Without scleral icterus.  Mucous membranes are moist. EOM are WNL.  Neck: No tenderness to palpitation.  Full range of motion to flexion and extension, lateral rotation and lateral flexion of cervical spine.  No JVD. Supple.   Lungs: Bilateral chest rise. Good excursion of the diaphragms. No secondary use of accessory respiratory musculature.  Cardiac: Regular rate and rhythm. No murmur.  Abdomen: Soft, tender to palpation in the left lower quadrant, nondistended  Extremities:  No lower extremity edema noted.  Without clubbing or cyanosis.  2+ pulses x4  Skin: Normal texture and turgor.      Laboratory Data and Relevant Imaging:  Recent Labs   Lab 06/14/25  2046 06/15/25  0522   RBC 3.55* 2.87*   HGB 13.7 10.8*   HCT 37.2 30.0*   .8* 104.5*   MCH 38.6* 37.6*   MCHC 36.8 36.0   RDW 11.9 12.0   NEPRELIM 7.88* 3.81   WBC 10.6 5.9   .0 162.0       Recent Labs   Lab 06/14/25  2046 06/15/25  0522   * 101*   BUN 15 13   CREATSERUM 1.29* 1.21*   CA 9.1 7.7*   ALB 4.0 3.3   * 134*   K 3.6 3.1*   CL 96* 102   CO2 21.0 22.0   ALKPHO 340* 249*   * 86*   ALT 81* 63*   BILT 3.1* 2.3*   TP 6.6 5.5*         Recent Labs   Lab 06/14/25  2234   PTP 14.8   INR 1.18        Imaging  MRI ABDOMEN+PELVIS (ALL W+WO) (CPT=74183/86191)  Result Date: 6/15/2025  CONCLUSION:   1. Cirrhotic liver morphology with confluent hepatic fibrosis and relative atrophy of the caudate and lateral segment left lobe.  This is not the typical pattern of cirrhosis as seen with chronic hepatocellular disease and may be a manifestation of primary biliary cholangitis. Clinical and biochemical correlation advised.  2. Splenomegaly, additionally with small upper abdominal varices consistent with portal hypertension.  3. Complex process of the left adnexa (3.8 x 3.8 x 4.8 cm) demonstrates heterogeneous enhancement with central areas of cystic degeneration/necrosis.  This demonstrates broad contact with the left ovarian vein as well as the mid sigmoid colon with stranding/inflammation about the mid sigmoid.  Differential includes neoplasm or abscess of the left ovary versus diverticular abscess of the mid sigmoid colon.  LOCATION:  [facility]   Dictated by (CST): Rolly Kaiser MD on 6/15/2025 at 8:15 PM     Finalized by (CST): Rolly Kaiser MD on 6/15/2025 at 8:32 PM       US PELVIS (TRANSABDOMINAL AND TRANSVAGINAL) (CPT=76856/56034)  Result Date: 6/14/2025  CONCLUSION:   The ovaries are not visualized despite exhaustive search.  Left adnexal heterogeneous mass is similar to recent CT examination.  This may be due to infectious process related to diverticulitis.  This is continuous the diverticula on prior CT exam.  Possibility of this representing a periovarian process still cannot be excluded.   LOCATION:  [facility]   Dictated by (CST): Nelson Joseph MD on 6/14/2025 at 11:54 PM     Finalized by (CST): Nelson Joseph MD on 6/14/2025 at 11:57 PM       CT ABDOMEN+PELVIS(CONTRAST ONLY)(CPT=74177)  Result Date: 6/14/2025  CONCLUSION:   1. Interval cirrhotic configuration of the liver.  2. Left adnexal heterogeneous enhancing mass is continuous with diverticula of the sigmoid colon.  This may be sequelae of acute  diverticulitis.  There is a similar diverticulitis that extend to the to the left adnexa in 2019. However, this can also be related to left adnexal lesion as inflammatory changes extending to the sigmoid colon.  Recommend a follow-up pelvic ultrasound exam.  This critical result was discussed with Dr. Yarbrough at 2200 hours on 6/14/2025. Read back was performed.     LOCATION:  [facility]   Dictated by (CST): Nelson Joseph MD on 6/14/2025 at 9:53 PM     Finalized by (CST): Nelson Joseph MD on 6/14/2025 at 10:00 PM           Impression/Plan:  Problem List[6]    59 year old female with diverticulitis with abscess versus left adnexal mass    CT images were reviewed by me personally  CT shows some fat stranding near the segment of sigmoid colon with changes of the left adnexa that closely abuts the diverticulum, no free fluid, no pain fluid concerning for abscess anywhere else  Patient did have MRI of the abdomen pelvis and MRI showed diverticulitis with possible abscess versus a left adnexal mass  Patient had pelvic ultrasound done which was nondiagnostic and unable to find the left ovary    No acute surgical intervention at this time  Patient is improving with conservative treatment including IV antibiotics and bowel rest  Continue clear liquids for now  If patient has continued improvement of her pain, can advance diet to fulls and as tolerated tomorrow  Regarding patient's imaging with abscess versus left adnexal mass, recommend following patient clinically in light of her cirrhosis, currently MELD 22, child class B  If patient has any worsening abdominal pain, leukocytosis, or fever, can consult IR for possible drainage of the adnexal abscess  Surgery will continue to follow  Rest of care per    Thank you for letting me participate in the care of this patient    Fela Key MD  6/15/2025  9:28 PM         [1]   Past Medical History:   Acute sinusitis, unspecified    Anxiety    Anxiety state, unspecified     Bloating    Depression    Diverticulosis of large intestine    Esophageal reflux    Esophagitis    LA Grade B esophagitis    Essential hypertension    Flatulence/gas pain/belching    Gastritis    Heartburn    Hemorrhoids    Hiatal hernia    History of mental disorder    Depression and anxiety    Infective otitis externa, unspecified    Internal hemorrhoids    Nonspecific colitis    mild, non-specific colitis, possibly prep induced    Other disorder of menstruation and other abnormal bleeding from female genital tract    Sleep disturbance    Wears glasses   [2]   Past Surgical History:  Procedure Laterality Date    Cholecystectomy      Colonoscopy      Colonoscopy      Colonoscopy & polypectomy  11/14/2013    Dr. Gil; Due 11/14/2018    Other  24 years ago    trauma to right leg    Upper gi endoscopy,biopsy  11/14/2013    Dr. Gil; Due 5/14/2014   [3]   Family History  Problem Relation Age of Onset    Diabetes Father     Hypertension Father     Stroke Father         Mild stroke in Dec 2021    Hypertension Mother     Gastro-Intestinal Disorder Mother         Crohns    Crohn's Disease Mother     Cancer Maternal Grandmother         ovarian cancer    Gastro-Intestinal Disorder Sister         diverticulitis    Cancer Sister         cervical cancer    Colon Cancer Maternal Grandfather    [4] No Known Allergies  [5]   Current Facility-Administered Medications:     hydrOXYzine (Atarax) tab 10-20 mg, 10-20 mg, Oral, Q8H PRN    traZODone (Desyrel) tab 150 mg, 150 mg, Oral, Nightly    melatonin tab 3 mg, 3 mg, Oral, Nightly PRN    sodium chloride 0.9% infusion, , Intravenous, Continuous    acetaminophen (Tylenol Extra Strength) tab 500 mg, 500 mg, Oral, Q4H PRN    acetaminophen (Tylenol) tab 650 mg, 650 mg, Oral, Q4H PRN **OR** HYDROcodone-acetaminophen (Norco) 5-325 MG per tab 1 tablet, 1 tablet, Oral, Q4H PRN **OR** HYDROcodone-acetaminophen (Norco) 5-325 MG per tab 2 tablet, 2 tablet, Oral, Q4H PRN     morphINE PF 2 MG/ML injection 1 mg, 1 mg, Intravenous, Q2H PRN **OR** morphINE PF 2 MG/ML injection 2 mg, 2 mg, Intravenous, Q2H PRN **OR** morphINE PF 4 MG/ML injection 4 mg, 4 mg, Intravenous, Q2H PRN    polyethylene glycol (PEG 3350) (Miralax) 17 g oral packet 17 g, 17 g, Oral, Daily PRN    sennosides (Senokot) tab 17.2 mg, 17.2 mg, Oral, Nightly PRN    bisacodyl (Dulcolax) 10 MG rectal suppository 10 mg, 10 mg, Rectal, Daily PRN    fleet enema (Fleet) rectal enema 133 mL, 1 enema, Rectal, Once PRN    ondansetron (Zofran) 4 MG/2ML injection 4 mg, 4 mg, Intravenous, Q6H PRN    prochlorperazine (Compazine) 10 MG/2ML injection 5 mg, 5 mg, Intravenous, Q8H PRN    piperacillin-tazobactam (Zosyn) 3.375 g in dextrose 5% 100 mL IVPB-ADDV, 3.375 g, Intravenous, Q8H    nicotine (Nicoderm CQ) 14 MG/24HR patch 1 patch, 1 patch, Transdermal, Daily  [6]   Patient Active Problem List  Diagnosis    Generalized anxiety disorder    Hypertension    Current smoker    FH: colon cancer    Hiatal hernia    GERD (gastroesophageal reflux disease)    Insomnia secondary to anxiety    Alcoholic (HCC)    History of alcohol dependence (HCC)    Acute adjustment disorder with anxiety    Transaminitis    Fatty liver    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

## 2025-06-16 NOTE — CONSULTS
[facility]  Obstetrics and Gynecology Consultation     June Goncalves Patient Status:  Inpatient    10/12/1965 MRN [id number]   Location [facility] 3NW-A Attending Britton Figueroa DO   Hospital Day 1 PCP Karrie Orta DO     Reason for Consultation: adnexal mass      Subjective:     HPI: June Goncalves is a 59 year old  female who was admitted on 2025 with c/o left lower abdominal pain, found to have LLQ adnexal mass in setting of acute diverticulitis and elevated LFTs. General surgery and GI on consult. Gyne consulted for left adnexal mass. The patient today reports pain has improved since hospital admission and IV antibiotics. She went through menopause many years ago and denies any vaginal bleeding since then. She denies any abnormal vaginal discharge, dyspareunia, dysmenorrhea, incontinence of urine or stool and breast complaints. Is concerned about acute onset LLQ pelvic pain about 1 hour ago with pain traveling down back of left leg. Did receive Norco immediately after. Is tolerating CLD at the moment. Denies N/V. Has been afebrile since admission.    OB History:  OB History    Para Term  AB Living   1 1 0 0 0 0   SAB IAB Ectopic Multiple Live Births   0 0 0 0 0       Gyne History:  No LMP recorded. (Menstrual status: Menopause).      Meds:  Current Hospital Medications[1]     All:  Allergies[2]    PMH:  Past Medical History[3]    PSH:  Past Surgical History[4]    SH:  Social History     Socioeconomic History    Marital status:      Spouse name: Not on file    Number of children: Not on file    Years of education: Not on file    Highest education level: Not on file   Occupational History    Not on file   Tobacco Use    Smoking status: Every Day     Current packs/day: 0.50     Average packs/day: 0.5 packs/day for 29.0 years (14.5 ttl pk-yrs)     Types: Cigarettes    Smokeless tobacco: Never   Vaping Use    Vaping status: Never Used   Substance and Sexual Activity     Alcohol use: Not Currently     Comment: None currently     Drug use: Never    Sexual activity: Not on file   Other Topics Concern     Service Not Asked    Blood Transfusions Not Asked    Caffeine Concern No    Occupational Exposure Not Asked    Hobby Hazards Not Asked    Sleep Concern Not Asked    Stress Concern No    Weight Concern No    Special Diet No    Back Care Not Asked    Exercise No    Bike Helmet Not Asked    Seat Belt Yes    Self-Exams Not Asked   Social History Narrative    Not on file     Social Drivers of Health     Food Insecurity: No Food Insecurity (6/15/2025)    NCSS - Food Insecurity     Worried About Running Out of Food in the Last Year: No     Ran Out of Food in the Last Year: No   Transportation Needs: No Transportation Needs (6/15/2025)    NCSS - Transportation     Lack of Transportation: No   Stress: Not on file   Housing Stability: Not At Risk (6/15/2025)    NCSS - Housing/Utilities     Has Housing: Yes     Worried About Losing Housing: No     Unable to Get Utilities: No       FH:  Family History[5]    ROS:  General:  denies fevers, chills, fatigue and malaise.   Eyes:  no visual changes, denies headaches  ENT:  denies earaches, runny nose, epistaxis, throat pain or sore throat  Respiratory:  denies SOB, dyspnea, cough or wheezing  Cardiovascular:  denies chest pain, palpitations, exercise intolerance   GI: denies abdominal pain, constipation; +diarrhea, +LLQ pain  :  denies dysuria, increased urinary frequency.   Heme:  denies history of excessive bleeding or bruising, denies dizziness, lightheadedness.   Breast:  denies rashes, skin changes, pain, lumps or discharge   Psychiatric:  denies depression, changes in sleep patterns, anxiety  Endocrine: denies hot or cold intolerance, mood changes  Neurological:  denies changes in sight, smell, hearing or taste. Denies seizures or tremors  Immunological:  denies allergies, denies anaphylaxis, or swollen lymph nodes  Musculoskeletal:   denies joint pain, morning stiffness, decreased range of motion       Objective:     Vitals:    06/16/25 1222   BP: 94/64   Pulse: 70   Resp: 16   Temp: 97.9 °F (36.6 °C)       General:  AAO. NAD.   CVS exam: normal rate, regular rhythm  Chest: breathing room air, no accessory muscle use, no wheezing  Breast: deferred  Abdominal exam: soft, nontender, nondistended, no masses or organomegaly  Pelvic exam: deferred  Ext: non-tender, no edema    Labs:  Lab Results   Component Value Date    WBC 5.6 06/16/2025    HGB 11.1 06/16/2025    HCT 30.8 06/16/2025    .0 06/16/2025    CREATSERUM 0.86 06/16/2025    BUN 7 06/16/2025     06/16/2025    K 3.4 06/16/2025     06/16/2025    CO2 19.0 06/16/2025     06/16/2025    CA 7.8 06/16/2025    ALB 3.5 06/16/2025    ALKPHO 249 06/16/2025    BILT 2.2 06/16/2025    TP 5.8 06/16/2025    AST 96 06/16/2025    ALT 67 06/16/2025    INR 1.33 06/16/2025    B12 >2,000 06/16/2025       Imaging:  MRI ABDOMEN+PELVIS (ALL W+WO) (CPT=74183/16778)  Result Date: 6/15/2025  CONCLUSION:   1. Cirrhotic liver morphology with confluent hepatic fibrosis and relative atrophy of the caudate and lateral segment left lobe.  This is not the typical pattern of cirrhosis as seen with chronic hepatocellular disease and may be a manifestation of primary biliary cholangitis. Clinical and biochemical correlation advised.  2. Splenomegaly, additionally with small upper abdominal varices consistent with portal hypertension.  3. Complex process of the left adnexa (3.8 x 3.8 x 4.8 cm) demonstrates heterogeneous enhancement with central areas of cystic degeneration/necrosis.  This demonstrates broad contact with the left ovarian vein as well as the mid sigmoid colon with stranding/inflammation about the mid sigmoid.  Differential includes neoplasm or abscess of the left ovary versus diverticular abscess of the mid sigmoid colon.  LOCATION:  [facility]   Dictated by (CST): Rolly Kaiser MD on 6/15/2025  at 8:15 PM     Finalized by (CST): Rolly Kaiser MD on 6/15/2025 at 8:32 PM        US PELVIS (TRANSABDOMINAL AND TRANSVAGINAL) (CPT=76856/06428)  Result Date: 2025  CONCLUSION:   The ovaries are not visualized despite exhaustive search.  Left adnexal heterogeneous mass is similar to recent CT examination.  This may be due to infectious process related to diverticulitis.  This is continuous the diverticula on prior CT exam.  Possibility of this representing a periovarian process still cannot be excluded.   LOCATION:  [facility]   Dictated by (CST): Nelson Joseph MD on 2025 at 11:54 PM     Finalized by (CST): Nelson Joseph MD on 2025 at 11:57 PM        CT ABDOMEN+PELVIS (CONTRAST ONLY)(CPT=74177)  Result Date: 2025  CONCLUSION:   1. Interval cirrhotic configuration of the liver.  2. Left adnexal heterogeneous enhancing mass is continuous with diverticula of the sigmoid colon.  This may be sequelae of acute diverticulitis.  There is a similar diverticulitis that extend to the to the left adnexa in 2019. However, this can also be related to left adnexal lesion as inflammatory changes extending to the sigmoid colon.  Recommend a follow-up pelvic ultrasound exam.  This critical result was discussed with Dr. Yarbrough at 2200 hours on 2025. Read back was performed.     LOCATION:  [facility]   Dictated by (CST): Nelson Joseph MD on 2025 at 9:53 PM     Finalized by (CST): Nelson Joseph MD on 2025 at 10:00 PM        Assessment:      June Goncalves is a 59 year old  female who was admitted on 2025 for acute diverticulitis treatment in setting of LLQ pain and new left adnexal mass. Request for OBGYN consultation was made.     Problem List[6]      Plan:     Left Adnexal Mass  - Pt with acute onset LLQ pain in setting of prior h/o diverticulitis  - On IV Zosyn for suspected diverticulitis, defer to primary  - GI and GS on consult, appreciate recommendations  - Imaging reviewed and  notable for complex process in left adnexa measuring 3.8 x 3.8 x 4.8 cm but difficult to say if this is gynecologic vs GI in origin  - Recommend repeat Pelvic MRI 48 hrs from prior imaging to compare and contrast size of adnexal mass  - Consider IR drainage of adnexal mass if imaging more consistent with abscess  - Recommend tumor markers to rule out malignant ovarian process; orders for CA-125, HE-4, and CEA placed today  - No acute surgical interventions indicated at this time and patient is currently declining surgical intervention today      All of the findings and plan were discussed with the patient.  She notes understanding and agrees with the plan of care.  All questions were answered to the best of my ability at this time.      Total patient time was 50 minutes in evaluation, consultation, and coordination of care. This included face to face and non-face to face actions. The patient's questions and concerns were addressed.       The 21st Century Cares Act makes medical reports and notes available to patients in the interest of transparency.  However, please be advised that this is a medical document.  Please be advised the primary purpose of this note is for me to communicate medical care.  Standard sentence structure is not always used.  Medical terminology and medical abbreviations may be used.  It may be a grammatical and typographical errors missed in proofreading.    Odalis Villasenor DO  [facility] - OBGYN      Discussed with patient that there will not be further notification of normal or benign results other than receiving results on [employer]. A [employer] message or telephone call will be placed by the physician and/or office staff if results are abnormal.      This note could include assistance by Dragon voice recognition. Errors in content may be related to improper recognition by the system; efforts to review and correct have been done but errors may still exist.        [1]   pantoprazole     hydrOXYzine    traZODone    melatonin    sodium chloride    acetaminophen    acetaminophen **OR** HYDROcodone-acetaminophen **OR** HYDROcodone-acetaminophen    morphINE **OR** morphINE **OR** morphINE    polyethylene glycol (PEG 3350)    sennosides    bisacodyl    fleet enema    ondansetron    prochlorperazine    piperacillin-tazobactam    nicotine  [2] No Known Allergies  [3]   Past Medical History:   Acute sinusitis, unspecified    Anxiety    Anxiety state, unspecified    Bloating    Depression    Diverticulosis of large intestine    Esophageal reflux    Esophagitis    LA Grade B esophagitis    Essential hypertension    Flatulence/gas pain/belching    Gastritis    Heartburn    Hemorrhoids    Hiatal hernia    History of mental disorder    Depression and anxiety    Infective otitis externa, unspecified    Internal hemorrhoids    Nonspecific colitis    mild, non-specific colitis, possibly prep induced    Other disorder of menstruation and other abnormal bleeding from female genital tract    Sleep disturbance    Wears glasses   [4]   Past Surgical History:  Procedure Laterality Date    Cholecystectomy      Colonoscopy      Colonoscopy      Colonoscopy & polypectomy  11/14/2013    Dr. Gil; Due 11/14/2018    Other  24 years ago    trauma to right leg    Upper gi endoscopy,biopsy  11/14/2013    Dr. Gil; Due 5/14/2014   [5]   Family History  Problem Relation Age of Onset    Diabetes Father     Hypertension Father     Stroke Father         Mild stroke in Dec 2021    Hypertension Mother     Gastro-Intestinal Disorder Mother         Crohns    Crohn's Disease Mother     Cancer Maternal Grandmother         ovarian cancer    Gastro-Intestinal Disorder Sister         diverticulitis    Cancer Sister         cervical cancer    Colon Cancer Maternal Grandfather    [6]   Patient Active Problem List  Diagnosis    Generalized anxiety disorder    Hypertension    Current smoker    FH: colon cancer    Hiatal hernia     GERD (gastroesophageal reflux disease)    Insomnia secondary to anxiety    Alcoholic (HCC)    History of alcohol dependence (HCC)    Acute adjustment disorder with anxiety    Transaminitis    Fatty liver    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

## 2025-06-16 NOTE — DIETARY NOTE
[facility]   part of [facility]   CLINICAL NUTRITION    June Goncalves     Admitting diagnosis:  Hyperbilirubinemia [E80.6]  Intra-abdominal abscess (HCC) [K65.1]  Elevated liver enzymes [R74.8]  Acute diverticulitis [K57.92]    Ht: 162.6 cm (5' 4\")  Wt: 73.6 kg (162 lb 4.8 oz).   Body mass index is 27.86 kg/m².  IBW: 54.5kg  UBW: 165#    Wt Readings from Last 6 Encounters:   06/15/25 73.6 kg (162 lb 4.8 oz)   02/14/24 69.9 kg (154 lb)   10/14/22 70.3 kg (155 lb)   02/21/22 74.8 kg (165 lb)   11/05/21 75.3 kg (166 lb)   10/09/21 75.3 kg (166 lb)        Labs/Meds reviewed    Diet:       Procedures    Clear liquid diet Is Patient on Accuchecks? No     Percent Meals Eaten (last 3 days)       Date/Time Percent Meals Eaten (%)    06/16/25 0823 0 %     Percent Meals Eaten (%): PT wants to wait for lunchtime at 06/16/25 0823    06/16/25 1222 100 %            Pt chart reviewed d/t +MST. 59 year old female admitted for diverticulitis with abscess versus left adnexal mass.No surgical intervention, on bowel rest. Currently on clears.   Nursing notes reports Percent Meals Eaten (%): 100 % intake for last meal. Familiar with diet advancement.   No significant weight changes noted. Got her wt back up to about 165# after last diverticulitis bout. Admits she doesn't always follow the healthiest diet but currently is afraid to eat much of anything.     Patient is at low nutrition risk at this time.    Please consult if patient status changes or nutrition issues arise.    Maria Guadalupe Hahn RD, LDN  Clinical Nutrition  [id number]      '   (4) completely dependent

## 2025-06-16 NOTE — PROGRESS NOTES
[facility]   part of [facility]     Hospitalist Progress Note     June LEWIS Ivanna Patient Status:  Inpatient    10/12/1965 MRN [id number]   Location [facility] 3NW-A Attending Britton Figueroa DO   Hosp Day # 1 PCP Karrie Orta DO     Chief Complaint: Abdominal pain    Subjective:     Patient seen and examined. Pain in left lower abdomen/groin controlled.    Objective:    Review of Systems:   A comprehensive review of systems was completed; pertinent positive and negatives stated in subjective.    Vital signs:  Temp:  [97.9 °F (36.6 °C)-98.5 °F (36.9 °C)] 97.9 °F (36.6 °C)  Pulse:  [70-80] 70  Resp:  [16-18] 16  BP: (83-94)/(57-65) 94/64  SpO2:  [96 %-99 %] 98 %    Physical Exam:    General: No acute distress  Respiratory: No wheezes, no rhonchi  Cardiovascular: S1, S2, regular rate and rhythm  Abdomen: Soft, left lower abdominal tenderness, non-distended, positive bowel sounds  Neuro: No new focal deficits.   Extremities: No edema    Diagnostic Data:    Labs:  Recent Labs   Lab 25  2046 25  2234 06/15/25  0522 25  0515 25  1125   WBC 10.6  --  5.9 5.6  --    HGB 13.7  --  10.8* 11.1*  --    .8*  --  104.5* 105.5*  --    .0  --  162.0 153.0  --    INR  --  1.18  --   --  1.33*       Recent Labs   Lab 06/15/25  0522 25  0515 25  1124   * 95 171*   BUN 13 8* 7*   CREATSERUM 1.21* 0.85 0.86   CA 7.7* 7.8* 7.8*   ALB 3.3 3.2 3.5   * 140 138   K 3.1* 4.0  4.0 3.4*    109 109   CO2 22.0 21.0 19.0*   ALKPHO 249* 245* 249*   AST 86* 90* 96*   ALT 63* 64* 67*   BILT 2.3* 1.9* 2.2*   TP 5.5* 5.4* 5.8       Estimated Glomerular Filtration Rate: 78 mL/min/1.73m2 (result from lab).    No results for input(s): \"TROP\", \"TROPHS\", \"CK\" in the last 168 hours.    Recent Labs   Lab 25  2234 25  1125   PTP 14.8 16.6*   INR 1.18 1.33*                  Microbiology    Hospital Encounter on 25   1. Blood Culture     Status: None  (Preliminary result)    Collection Time: 06/14/25 10:36 PM    Specimen: Blood,peripheral   Result Value Ref Range    Blood Culture Result No Growth 1 Day N/A         Imaging: Reviewed in Epic.    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 Goncalves is expected to be discharge to: home     The 21st Century Cures Act makes medical notes like these available to patients in the interest of transparency. Please be advised this is a medical document. Medical documents are intended to carry relevant information, facts as evident, and the clinical opinion of the practitioner. The medical note is intended as peer to peer communication and may appear blunt or direct. It is written in medical language and may contain abbreviations or verbiage that are unfamiliar.                         [1]    pantoprazole  40 mg Intravenous Q24H    traZODone  150 mg Oral Nightly    piperacillin-tazobactam  3.375 g Intravenous Q8H    nicotine  1 patch Transdermal Daily

## 2025-06-16 NOTE — PAYOR COMM NOTE
--------------  ADMISSION REVIEW     Payor: [employer]   Subscriber #:  [id number]  Authorization Number: [id number]    Admit date: 6/15/25  Admit time:  1:09 AM     REVIEW DOCUMENTATION:  ED Provider Notes signed by Uriel Yarbrough MD at 6/14/2025 11:11 PM       Author: Uriel Yarbrough MD Service: Emergency Medicine Author Type: Physician    Filed: 6/14/2025 11:11 PM Date of Service: 6/14/2025 10:32 PM Status: Addendum     Patient Seen in: [facility] [place]    History  Chief Complaint   Patient presents with    Abdomen/Flank Pain     Stated Complaint: is on abx for diverticulitis but pain continues    HPI    59-year-old woman here for evaluation of lower abdominal pain.  Has had diverticulitis in the past this was similar, was seen in immediate care about 6 days ago started on antibiotics, has been on Augmentin, states symptoms not getting any better.  Denies any fevers any vomiting diarrhea any other complaints or concerns.    Physical Exam  ED Triage Vitals [06/14/25 1831]   /70   Pulse 118   Resp 18   Temp 98 °F (36.7 °C)   Temp src Oral   SpO2 96 %   O2 Device None (Room air)     Vital Signs  BP: 93/63  Pulse: 92  Resp: 16  Temp: 98 °F (36.7 °C)  Temp src: Oral    Oxygen Therapy  SpO2: 96 %  O2 Device: None (Room air)    Physical Exam  Vitals signs and nursing note reviewed.   General:  Patient laying supine in the bed in no acute distress  Head: Normocephalic and atraumatic.   HEENT:  Mucous membranes are moist.   Cardiovascular:  Normal rate and regular rhythm.  No Edema  Pulmonary:  Pulmonary effort is normal.  Normal breath sounds. No wheezing, rhonchi or rales.   Abdominal: Abdomen is soft, there is diffuse left-sided abdominal tenderness and focal tenderness in the left lower quadrant, no rebound tenderness.  Yang sign negative  Skin: Warm and dry  Neurological: Awake alert, speech is normal    ED Course  Labs Reviewed   COMP METABOLIC PANEL (14) - Abnormal; Notable  for the following components:       Result Value    Glucose 122 (*)     Sodium 129 (*)     Chloride 96 (*)     Creatinine 1.29 (*)     Calculated Osmolality 270 (*)     eGFR-Cr 48 (*)      (*)     ALT 81 (*)     Alkaline Phosphatase 340 (*)     Bilirubin, Total 3.1 (*)     All other components within normal limits   CBC WITH DIFFERENTIAL WITH PLATELET - Abnormal; Notable for the following components:    RBC 3.55 (*)     .8 (*)     MCH 38.6 (*)     Neutrophil Absolute Prelim 7.88 (*)     Neutrophil Absolute 7.88 (*)     Monocyte Absolute 1.15 (*)     All other components within normal limits   LIPASE - Abnormal; Notable for the following components:    Lipase 74 (*)     All other components within normal limits   URINALYSIS WITH CULTURE REFLEX   LACTIC ACID, PLASMA   PROTHROMBIN TIME (PT)   BLOOD CULTURE   BLOOD CULTURE     CT ABDOMEN+PELVIS(CONTRAST ONLY)(CPT=74177)  Result Date: 6/14/2025  CONCLUSION:   1. Interval cirrhotic configuration of the liver.  2. Left adnexal heterogeneous enhancing mass is continuous with diverticula of the sigmoid colon.  This may be sequelae of acute diverticulitis.  There is a similar diverticulitis that extend to the to the left adnexa in 2019. However, this can also be related to left adnexal lesion as inflammatory changes extending to the sigmoid colon.  Recommend a follow-up pelvic ultrasound exam.  This critical result was discussed with Dr. Yarbrough at 2200 hours on 6/14/2025. Read back was performed.         MDM  59-year-old woman here for evaluation of left-sided abdominal pain over the past 6 days.  Differential includes diverticulitis colitis, intra-abdominal abscess.  CT abdomen pelvis was obtained and shows what appears to be diverticulitis along with the heterogenous mass near the left adnexa, concerning for possible abscess.  Will obtain a pelvic ultrasound to better delineate this area.  Basic labs ordered and reviewed liver enzymes elevated, bilirubin  elevated at 3.1, increased from previous.  Patient states was drinking up until about a week ago.  Will be admitted for further evaluation or treatment, was covered with IV Zosyn Case discussed with Dr. Key from general surgery will evaluate the patient tomorrow, [facility].     I independently viewed and interpreted the following imaging: CT abdomen pelvis no evidence of bowel obstruction    Admission disposition: 6/14/2025 10:14 PM  MDM  Disposition and Plan     Clinical Impression:  1. Acute diverticulitis    2. Intra-abdominal abscess (HCC)    3. Hyperbilirubinemia    4. Elevated liver enzymes       Disposition:  Admit  6/14/2025 10:14 pm    Uriel Yarbrough MD on 6/14/2025 11:11 PM        History and Physical   Chief Complaint: Abdominal pain  History of Present Illness:   59 year old female with hx of hypertension presents to the ER with lower abdominal pain that started a week ago. Pt went to a [facility] 6 days ago and was started on Augmentin. Pt reports that the pain did not improve. No fevers, chills, nausea, vomiting, or diarrhea. No hmeatochezia, melena or hematuria. No chest pain, SOB, palpitations.      Lab 06/14/25  2046   RBC 3.55*   HGB 13.7   HCT 37.2   .8*   MCH 38.6*   MCHC 36.8   RDW 11.9   NEPRELIM 7.88*   WBC 10.6   .0      *   BUN 15   CREATSERUM 1.29*   EGFRCR 48*   CA 9.1   ALB 4.0   *   K 3.6   CL 96*   CO2 21.0   ALKPHO 340*   *   ALT 81*   BILT 3.1*   TP 6.6       Assessment & Plan:  # Left adnexal mass  - -possible abscess  - Will continue on zosyn  - General surgery on consult     # Hypertension  - Losartan    # GERD  - will continue on PPI.       6/15/2025  Surgical Consultation with History and Physical Exam   Reason for Surgical Consultation: Seeing this patient in consultation for diverticulitis     History of Present Illness:  59 year old female who presented to hospital with  worsening abdominal pain.  Patient has history of diverticulitis and began to feel symptoms starting last weekend.  By Monday, she began to have significant left lower quadrant abdominal pain.  She immediately put herself on a clear liquid diet.  During the week, her pain continued to get worse.  She presented to the urgent care and got a course of antibiotics.  Her pain continued to get worse even with antibiotics.  Patient presents to the emergency room for further evaluation.  In the emergency room, she was hemodynamically stable.  Serology showed elevated LFTs with AST over /81, alk phos 340, and total bilirubin 3.1.  Patient did not have a leukocytosis.    CT imaging was performed which showed nodular contour of the liver concerning for cirrhosis and diverticulitis of the sigmoid colon with abscess versus mass of the abutting left adnexa.  General surgery was consulted for further evaluation and management.     On interview, patient reports significant improvement of her abdominal pain.  She has been having diarrhea but denies any nausea or vomiting.  Patient is tolerating clear liquids.  Her abdominal surgeries are significant for a cholecystectomy.  Her last colonoscopy was in 2020 and negative for any findings.  Patient is due for repeat colonoscopy in 2030.    Lab 06/14/25  2046 06/15/25  0522   RBC 3.55* 2.87*   HGB 13.7 10.8*   HCT 37.2 30.0*   .8* 104.5*   MCH 38.6* 37.6*   MCHC 36.8 36.0   RDW 11.9 12.0   NEPRELIM 7.88* 3.81   WBC 10.6 5.9   .0 162.0      * 101*   BUN 15 13   CREATSERUM 1.29* 1.21*   CA 9.1 7.7*   ALB 4.0 3.3   * 134*   K 3.6 3.1*   CL 96* 102   CO2 21.0 22.0   ALKPHO 340* 249*   * 86*   ALT 81* 63*   BILT 3.1* 2.3*   TP 6.6 5.5*     MRI ABDOMEN+PELVIS   6/15/2025  CONCLUSION: 1. Cirrhotic liver morphology with confluent hepatic fibrosis and relative atrophy of the caudate and lateral segment left lobe. This is not the typical pattern of  cirrhosis as seen with chronic hepatocellular disease and may be a manifestation of primary biliary cholangitis. Clinical and biochemical correlation advised. 2. Splenomegaly, additionally with small upper abdominal varices consistent with portal hypertension. 3. Complex process of the left adnexa (3.8 x 3.8 x 4.8 cm) demonstrates heterogeneous enhancement with central areas of cystic degeneration/necrosis. This demonstrates broad contact with the left ovarian vein as well as the mid sigmoid colon with stranding/inflammation about the mid sigmoid. Differential includes neoplasm or abscess of the left ovary versus diverticular abscess of the mid sigmoid colon.     Impression/Plan:   59 year old female with diverticulitis with abscess versus left adnexal mass     CT images were reviewed by me personally  CT shows some fat stranding near the segment of sigmoid colon with changes of the left adnexa that closely abuts the diverticulum, no free fluid, no pain fluid concerning for abscess anywhere else  Patient did have MRI of the abdomen pelvis and MRI showed diverticulitis with possible abscess versus a left adnexal mass  Patient had pelvic ultrasound done which was nondiagnostic and unable to find the left ovary     No acute surgical intervention at this time  Patient is improving with conservative treatment including IV antibiotics and bowel rest  Continue clear liquids for now  If patient has continued improvement of her pain, can advance diet to fulls and as tolerated tomorrow    Regarding patient's imaging with abscess versus left adnexal mass, recommend following patient clinically in light of her cirrhosis, currently MELD 22, child class B    If patient has any worsening abdominal pain, leukocytosis, or fever, can consult IR for possible drainage of the adnexal abscess  Surgery will continue to follow    6/16/2025  General Surgery   states that she feels overall about the same today. She states pain is not  worsening, though has not necessarily improved. She states she feels well and comfortable after she takes Norco. She is tolerating clear liquids without nausea or vomiting. Passing flatus and having loose stool. Denies any fevers or chills.       Component Value Date     WBC 5.6 06/16/2025     HGB 11.1 06/16/2025     HCT 30.8 06/16/2025     .0 06/16/2025        K 4.0 06/16/2025       I/O last 3 completed shifts:  In: 3263 [I.V.:3163; IV PIGGYBACK:100]  Out: 700 [Urine:700]  I/O this shift:  In: 360 [P.O.:360]  Out: 55 [Urine:55]      Assessment   Patient most likely has an abscess of her left ovary secondary to diverticulitis  She remains afebrile and otherwise hemodynamically stable    Plan:  I recommend continued conservative treatment with IV antibiotics  Continue clear liquids  If patient has continued improvement of her abdominal pain, can advance to fulls  Surgery will continue to follow      Hospitalist Progress Note   Pain in left lower abdomen/groin controlled      Lab 06/14/25 2046 06/14/25  2234 06/15/25  0522 06/16/25  0515 06/16/25  1125   WBC 10.6  --  5.9 5.6  --    HGB 13.7  --  10.8* 11.1*  --    .8*  --  104.5* 105.5*  --    .0  --  162.0 153.0  --    INR  --  1.18  --   --  1.33*     Lab 06/15/25  0522 06/16/25  0515 06/16/25  1124   * 95 171*   BUN 13 8* 7*   CREATSERUM 1.21* 0.85 0.86   CA 7.7* 7.8* 7.8*   ALB 3.3 3.2 3.5   * 140 138   K 3.1* 4.0  4.0 3.4*    109 109   CO2 22.0 21.0 19.0*   ALKPHO 249* 245* 249*   AST 86* 90* 96*   ALT 63* 64* 67*   BILT 2.3* 1.9* 2.2*   TP 5.5* 5.4* 5.8     Lab 06/14/25  2234 06/16/25  1125   PTP 14.8 16.6*   INR 1.18 1.33*       Assessment & Plan:  #Left adnexal lesion  -MRI reviewed   -Pending gyn eval     #Sigmoid colitis likely due to #1  -Continue empiric antibiotics  -No acute surgical intervention per general surgery      #Cirrhosis   -MRI raising concern for PBC - GI consulted     #Elevated LFTs  -Repeat LFTs  pending  -GI following    DVT Mechanical Prophylaxis:   SCDs,       Discharge is dependent on: clinical progress        MEDICATIONS ADMINISTERED:  gadoterate meglumine (Dotarem) 10 MMOL/20ML injection 20 mL       Date Action Dose Route User    6/15/2025 2013 Given 20 mL Intravenous Charly Franklin          HYDROcodone-acetaminophen (Norco) 5-325 MG per tab 1 tablet       Date Action Dose Route User    6/16/2025 1439 Given 1 tablet Oral Steve Blount RN    6/16/2025 0723 Given 1 tablet Oral Steve Blount RN    6/15/2025 2216 Given 1 tablet Oral Liliya Pugh RN          pantoprazole (Protonix) 40 mg in sodium chloride 0.9% PF 10 mL IV push       Date Action Dose Route User    6/16/2025 1048 Given 40 mg Intravenous Steve Blount RN          piperacillin-tazobactam (Zosyn) 3.375 g in dextrose 5% 100 mL IVPB-ADDV       Date Action Dose Route User    6/16/2025 1056 New Bag 3.375 g Intravenous Steve Blount RN    6/16/2025 0315 New Bag 3.375 g Intravenous Liliya Pugh RN    6/15/2025 1920 New Bag 3.375 g Intravenous Galina Srivastava RN          potassium chloride (Klor-Con M20) tab 40 mEq       Date Action Dose Route User    6/16/2025 1318 Given 40 mEq Oral Steve Blount RN          sodium chloride 0.9% infusion       Date Action Dose Route User    6/16/2025 1047 New Bag (none) Intravenous Steve Blount RN          Vitals       Date/Time Temp Pulse Resp BP SpO2 Weight O2 Device O2 Flow Rate (L/min) Who    06/16/25 1222 97.9 °F (36.6 °C) 70 16 94/64 98 % -- None (Room air) 0 L/min JF    06/16/25 0823 97.9 °F (36.6 °C) 73 16 90/64 99 % -- None (Room air) 0 L/min JF    06/16/25 0443 98.4 °F (36.9 °C) 79 16 93/65 99 % -- None (Room air) -- GL    06/15/25 2330 98.5 °F (36.9 °C) 80 16 83/58 99 % -- None (Room air) -- GL    06/15/25 1922 98.1 °F (36.7 °C) 78 16 91/61 98 % -- None (Room air) -- GL    06/15/25 1725 98.4 °F (36.9 °C) 74 18 84/57 96 % -- None (Room air) 0 L/min  SB    06/15/25 1156 97.8 °F (36.6 °C) 94 18 90/59 96 % -- None (Room air) 0 L/min SB    06/15/25 1024 -- 79 -- 88/56 96 % -- -- -- SB    06/15/25 0824 97.7 °F (36.5 °C) 76 18 74/53 92 % -- None (Room air) -- KR    06/15/25 0545 98.2 °F (36.8 °C) 83 19 85/57 96 % 162 lb 4.8 oz (73.6 kg) None (Room air) -- GB    06/15/25 0001 -- 88 15 94/61 99 % -- None (Room air) -- CB    06/15/25 0001 98.2 °F (36.8 °C) -- -- -- -- -- -- -- GB     06/14/25 2241 -- 91 15 90/60 96 % -- None (Room air) -- CB   06/14/25 2230 -- -- -- -- -- -- None (Room air) -- CB   06/14/25 2208 -- 92 16 93/63 96 % -- None (Room air) -- CB   06/14/25 2058 -- 103 15 109/77 95 % -- None (Room air) -- CB   06/14/25 1831 98 °F (36.7 °C) 118 18 108/70 96 % 159 lb (72.1 kg) None (Room air) --        FOR REVIEW/APPROVAL OF INPT ADMISSION

## 2025-06-16 NOTE — CONSULTS
[facility]                       Gastroenterology Consultation-[facility]    June Goncalves Patient Status:  Inpatient    10/12/1965 MRN [id number]   Location [facility] 3NW-A Attending Britton Figueroa DO   Hosp Day # 1 PCP Karrie Orta DO     Reason for consultation: Imaging suggestive of cirrhosis, r/o PBC   HPI: This is a 59 yr-old female with a PMhx that includes HTN, anxiety, depression, and active EtOH abuse (chronic EtOH use since age of 18, heavy use and dependence around  which required rehab, followed by a period of sobriety and most recently decreased to \"wine only\" drinking 6 glasses of wine daily) who presented to ER  with 1 week of lower abd pain thought d/t diverticulitis which failed to improve with outpt course of Augmentin. GI consulted as imaging suggests cirrhotic liver morphology  MRI abd/pelvis W + WO (6/15/25) suggests cirrhotic liver morphology with confluent hepatic fibrosis and relative atrophy of the caudate + lateral segment left lobe (? PBC), splenomegaly with small upper abd varices, and complex left adnexa; CT a/p IV (25) suggests nodular liver contour + left adnexal heterogeneous enhancing mass continuous with a diverticula in the sigmoid colon   Patient denies upper abd pain and improved LLQ discomfort. She denies changes in thought patterns, overt GI bleeding, or abd distention. She reports a decrease in appetite x 1 month however no wt loss or early satiety. No chronic nausea/vomiting. Baseline BM pattern consists of 2 stools daily however for the last month she notes watery nonbloody diarrhea up to 5x daily including nocturnal symptoms; no melena or hematochezia. Pt denies herbal supplements, hx IV drug use, NSAID use, acetaminophen use, known sick contacts, or recent travel. Most recent endoscopic evaluation 2020 (Dr. Ruiz) revealed question of esophageal varices and portal hypertensive gastropathy with moderate  diverticulosis.  PMHx: Past Medical History[1]             PSHx: Past Surgical History[2]  Medications: Current Medications[3]  Allergies: Allergies[4]  Social HX: Short Social Hx on File[5]   FamHx: The patient has no family history of colon cancer or other gastrointestinal malignancies;  No family history of ulcer disease, or inflammatory bowel disease  ROS:  In addition to the pertinent positives described above:            Infectious Disease:  No chronic infections or recent fevers prior to the acute illness            Cardiovascular: + HTN            Respiratory: No shortness of breath, asthma, copd, recurrent pneumonia            Hematologic: The patient reports no easy bruising, frequent gum bleeding or nose bleeding;  The patient has no history of known chronic anemia            Dermatologic: The patient reports no recent rashes or chronic skin disorders            Rheumatologic: The patient reports no history of chronic arthritis, myalgias, arthralgias            Genitourinary:  The patient reports no history of recurrent urinary tract infections, hematuria, dysuria, or nephrolithiasis           Psychiatric: + depression, anxiety, + EtOH abuse           Oncologic: The patient reports no history of prior solid tumor or hematologic malignancy           ENT: The patient reports no hoarseness of voice, hearing loss, sinus congestion, tinnitus           Neurologic: The patient reports no history of seizure, stroke, or frequent headaches  PE: BP 90/64 (BP Location: Right arm)   Pulse 73   Temp 97.9 °F (36.6 °C) (Oral)   Resp 16   Ht 5' 4\" (1.626 m)   Wt 162 lb 4.8 oz (73.6 kg)   SpO2 99%   BMI 27.86 kg/m²   Gen: AAO x 3, able to speak in complete sentences  HENT: EOMI, PERRL, oropharynx is clear with moist mucosal membranes  Eyes: Sclerae are anicteric  Neck:  Supple without nuchal rigidity  CV: Regular rate and rhythm, with normal S1 and S2  Resp: Clear to auscultation bilaterally without wheezes; rubs,  rhonchi, or rales  Abdomen: Soft, mild left lower quadrant tenderness with moderate palpation, non-distended with the presence of bowel sounds; No hepatosplenomegaly; no rebound or guarding; No ascites is clinically apparent; no tympany to percussion  Ext: No peripheral edema or cyanosis  Skin: Warm and dry  Psychiatric: Appropriate mood and congruent affect without obvious depression or anxiety  Neuro: No asterixis  Labs:   Lab Results   Component Value Date    WBC 5.6 06/16/2025    HGB 11.1 06/16/2025    HCT 30.8 06/16/2025    .0 06/16/2025    K 4.0 06/16/2025     Recent Labs   Lab 06/14/25  2046 06/15/25  0522 06/16/25  0515   * 101*  --    BUN 15 13  --    CREATSERUM 1.29* 1.21*  --    CA 9.1 7.7*  --    * 134*  --    K 3.6 3.1* 4.0   CL 96* 102  --    CO2 21.0 22.0  --      Recent Labs   Lab 06/16/25 0515   RBC 2.92*   HGB 11.1*   HCT 30.8*   .5*   MCH 38.0*   MCHC 36.0   RDW 12.3   NEPRELIM 3.77   WBC 5.6   .0       Recent Labs   Lab 06/14/25  2046 06/15/25  0522   ALT 81* 63*   * 86*       Imaging:   PROCEDURE:  MRI ABDOMEN+PELVIS (ALL W+WO) (CPT=74183/55633)     COMPARISON:  [facility], CT, CT ABDOMEN+PELVIS(CONTRAST ONLY)(CPT=74177), 6/14/2025, 9:42 PM.     INDICATIONS:  Left adnexal mass     TECHNIQUE:  A comprehensive examination was performed utilizing a variety of imaging planes and imaging parameters to optimize visualization of suspected pathology.  Images were obtained both before and after intravenous gadolinium infusion.       PATIENT STATED HISTORY:(As transcribed by Technologist)  Left adnexal mass      CONTRAST USED:  20 mL of Dotarem     FINDINGS:    LIVER:  Surface nodularity of the liver with relative atrophy of the caudate and lateral segment left lobe.  Areas of confluent hepatic fibrosis noted throughout the liver with reactive parenchymal edema of the lateral segment left lobe.  BILIARY:  Mild dilation of intrahepatic ducts of the lateral segment  left lobe.  Cholecystectomy with expected prominence of the common bile duct.  PANCREAS:  No lesion, fluid collection, ductal dilatation, or atrophy.    SPLEEN:  Splenomegaly with craniocaudal length of 14 cm.  KIDNEYS:  No mass or obstruction.    ADRENALS:  No mass or enlargement.    AORTA/VASCULAR:  No aneurysm or dissection.  Upper abdominal varices noted.    RETROPERITONEUM:  No mass or adenopathy.    BOWEL/MESENTERY:  No visible mass, obstruction, or bowel wall thickening.    ABDOMINAL WALL:  No mass or hernia.    PELVIC NODES:  No pelvic lymphadenopathy.  PELVIC ORGANS:  Anteverted uterus.  Normal thickness and signal of the endometrium and junctional zone.  Normal right ovary.  Complex process of the left adnexa measures 3.8 x 3.8 x 4.8 cm.  It demonstrates mixed T2 signal with avid, heterogeneous  enhancement including central areas of cystic degeneration/necrosis.  This process abuts diverticula of the sigmoid colon, additionally has broad contact with the distal left ovarian vein.  This could represent a mass or abscess associated left ovary.    Diverticular abscess remains a possibility, as there is stranding and hyperemia about the adjacent sigmoid colon.  BONES:  No bony lesion or fracture.    LUNG BASES:  No visible pleural disease.  Lung bases not well assessed with MRI.    OTHER:  Small volume of free fluid within the abdomen and pelvis.      Impression   CONCLUSION:       1. Cirrhotic liver morphology with confluent hepatic fibrosis and relative atrophy of the caudate and lateral segment left lobe.  This is not the typical pattern of cirrhosis as seen with chronic hepatocellular disease and may be a manifestation of  primary biliary cholangitis. Clinical and biochemical correlation advised.     2. Splenomegaly, additionally with small upper abdominal varices consistent with portal hypertension.     3. Complex process of the left adnexa (3.8 x 3.8 x 4.8 cm) demonstrates heterogeneous enhancement with  central areas of cystic degeneration/necrosis.  This demonstrates broad contact with the left ovarian vein as well as the mid sigmoid colon with  stranding/inflammation about the mid sigmoid.  Differential includes neoplasm or abscess of the left ovary versus diverticular abscess of the mid sigmoid colon.     LOCATION:  [facility]     Dictated by (CST): Rolly Kaiser MD on 6/15/2025 at 8:15 PM      Finalized by (CST): Rolly Kaiser MD on 6/15/2025 at 8:32 PM   ____________________________________________________________________________  PROCEDURE:  CT ABDOMEN+PELVIS (CONTRAST ONLY) (CPT=74177)     COMPARISON:  [facility], CT, CT ABDOMEN+PELVIS(CONTRAST ONLY)(CPT=74177), 1/16/2019, 4:18 PM.     INDICATIONS:  is on abx for diverticulitis but pain continues     TECHNIQUE:  CT scanning was performed from the dome of the diaphragm to the pubic symphysis with non-ionic intravenous contrast material. Post contrast coronal MPR imaging was performed.  Dose reduction techniques were used. Dose information is  transmitted to the ACR (American College of Radiology) NRDR (National Radiology Data Registry) which includes the Dose Index Registry.     PATIENT STATED HISTORY:(As transcribed by Technologist)   LLQ abdominal pain since Monday.  Pt has hx of diverticulitis, given abx Tuesday but pain persists      CONTRAST USED:  80cc of Isovue 370     FINDINGS:    LIVER:  Nodular contour of the liver is noted.  BILIARY:  Sequelae of cholecystectomy.  PANCREAS:  No lesion, fluid collection, ductal dilatation, or atrophy.    SPLEEN:  No enlargement or focal lesion.    KIDNEYS:  No mass, obstruction, or calcification.    ADRENALS:  No mass or enlargement.    AORTA/VASCULAR:  No aneurysm or dissection.    RETROPERITONEUM:  No mass or adenopathy.    BOWEL/MESENTERY:  The appendix is normal.  ABDOMINAL WALL:  No mass or hernia.    URINARY BLADDER:  No visible focal wall thickening, lesion, or calculus.    PELVIC NODES:  No adenopathy.    PELVIC  ORGANS:  Left adnexal 5.6 x 4.5 x 4.0 cm heterogeneous enhancing mass extends to multiple diverticula in the sigmoid colon.  BONES:  No bony lesion or fracture.    LUNG BASES:  No visible pulmonary or pleural disease.    OTHER:  Negative.        Impression   CONCLUSION:       1. Interval cirrhotic configuration of the liver.     2. Left adnexal heterogeneous enhancing mass is continuous with diverticula of the sigmoid colon.  This may be sequelae of acute diverticulitis.  There is a similar diverticulitis that extend to the to the left adnexa in 2019. However, this can also be  related to left adnexal lesion as inflammatory changes extending to the sigmoid colon.  Recommend a follow-up pelvic ultrasound exam.     This critical result was discussed with Dr. Yarbrough at 2200 hours on 6/14/2025. Read back was performed.       LOCATION:  [facility]     Dictated by (CST): Nelson Joseph MD on 6/14/2025 at 9:53 PM      Finalized by (CST): Nelson Joseph MD on 6/14/2025 at 10:00 PM   _______________________________________________________________  Impression: 59 yr-old female with hx of HTN, anxiety, depression, and active EtOH abuse (chronic EtOH use since age of 18, heavy use and dependence around 2014 which required rehab, followed by a period of sobriety and most recently decreased to \"wine only\" drinking 6 glasses of wine daily) admitted 6/14 with 1 week of lower abd pain thought d/t diverticulitis which failed to improve with outpt course of Augmentin. GI consulted as imaging suggests cirrhotic liver morphology with confluent hepatic fibrosis and relative atrophy of the caudate + lateral segment left lobe (? PBC), splenomegaly with small upper abd varices, and complex left adnexa  Will obtain expanded liver serology to assess for causes of cirrhosis including PBC.  Currently patient without signs of asterixis or overt GI bleeding. MELD-Na 22  and Child-Herr Score 7/Child Class B on arrival--will repeat labs now to  reassess.  Recommendations:     Obtain repeat CMP, INR now and obtain AMA, ASMA, LOREE, alpha 1 antitrypsin, viral hepatitis serology, iron studies, PT/INR, AFP  Stool for GI PCR panel and pancreatic elastase  Continue ABX per general surgery recommendations  Continue to advance diet per general surgery recommendations  DVT prophylaxis per hospitalist recommendations  Pain management per hospitalist recommendations limiting narcotics as able; antiemetics as needed  If acute change in mentation noted contact GI  Monitor for overt GI bleeding  Consider repeat EGD once acute issues with resolved for variceal surveillance  Long discussion with complete and absolute EtOH abstinence and smoking cessation--patient currently not interested in assistance programs  CBC, CMP, INR in a.m. correcting electrolytes per hospitalist recommendations    Thank you for the consultation, we will follow the patient with you.  Attending addendum (Dr OLIVIA Navas) to follow later today and provide formal, final recommendations at that time   BUZZ Aguero  10:37 AM  6/16/2025  [facility]  [phone]    GI attending addendum:    I have personally seen and examined this patient and agree with above nurse practitioner's assessment and recommendations.     Briefly, patient is a 59-year-old female that presented to the ER on June 14, 2025 with left lower abdominal pain and discomfort.  She was found to have suspected diverticulitis with abscess versus adnexal mass.  She is currently on antibiotics and her symptoms are improving.  We were consulted as she also had a possible cirrhotic morphology noted on MRI with findings concerning for possible PBC.  Patient notes she used to be a heavy drinker in the past and has had periods of abstinence and relapse.  Lab work today showed AST 90, ALT 64, alkaline phosphatase 245, total bili to 1.9.  INR 1.33.  Platelets 153.  On physical exam, patient is resting comfortably in bed.  Her  abdomen is soft, mildly tender to palpation in the left lower quadrant, and nondistended.  Patient with elevated liver enzymes and findings of possible cirrhosis.  Possibly alcoholic cirrhosis but will also rule out PBC given the imaging findings along with ruling out additional etiologies.  Follow-up autoimmune markers.  Her left lower quadrant abdominal pain is secondary to suspected complicated diverticulitis with abscess versus adnexal mass.  Continue antibiotics.  General surgery and gynecology are following.  Consider IR abscess drainage.  If surgery is needed, would recommend considering transfer to tertiary care center given her cirrhosis.  Additional recommendations as above.  Thank you for the consultation.  Will continue to following with you.    Fabian Navas,   9:43 PM  6/16/2025  [facility]  [phone]           [1]   Past Medical History:   Acute sinusitis, unspecified    Anxiety    Anxiety state, unspecified    Bloating    Depression    Diverticulosis of large intestine    Esophageal reflux    Esophagitis    LA Grade B esophagitis    Essential hypertension    Flatulence/gas pain/belching    Gastritis    Heartburn    Hemorrhoids    Hiatal hernia    History of mental disorder    Depression and anxiety    Infective otitis externa, unspecified    Internal hemorrhoids    Nonspecific colitis    mild, non-specific colitis, possibly prep induced    Other disorder of menstruation and other abnormal bleeding from female genital tract    Sleep disturbance    Wears glasses   [2]   Past Surgical History:  Procedure Laterality Date    Cholecystectomy      Colonoscopy      Colonoscopy      Colonoscopy & polypectomy  11/14/2013    Dr. Gil; Due 11/14/2018    Other  24 years ago    trauma to right leg    Upper gi endoscopy,biopsy  11/14/2013    Dr. Gil; Due 5/14/2014   [3]    pantoprazole (Protonix) 40 mg in sodium chloride 0.9% PF 10 mL IV push  40 mg Intravenous Q24H     hydrOXYzine (Atarax) tab 10-20 mg  10-20 mg Oral Q8H PRN    traZODone (Desyrel) tab 150 mg  150 mg Oral Nightly    melatonin tab 3 mg  3 mg Oral Nightly PRN    sodium chloride 0.9% infusion   Intravenous Continuous    acetaminophen (Tylenol Extra Strength) tab 500 mg  500 mg Oral Q4H PRN    acetaminophen (Tylenol) tab 650 mg  650 mg Oral Q4H PRN    Or    HYDROcodone-acetaminophen (Norco) 5-325 MG per tab 1 tablet  1 tablet Oral Q4H PRN    Or    HYDROcodone-acetaminophen (Norco) 5-325 MG per tab 2 tablet  2 tablet Oral Q4H PRN    morphINE PF 2 MG/ML injection 1 mg  1 mg Intravenous Q2H PRN    Or    morphINE PF 2 MG/ML injection 2 mg  2 mg Intravenous Q2H PRN    Or    morphINE PF 4 MG/ML injection 4 mg  4 mg Intravenous Q2H PRN    polyethylene glycol (PEG 3350) (Miralax) 17 g oral packet 17 g  17 g Oral Daily PRN    sennosides (Senokot) tab 17.2 mg  17.2 mg Oral Nightly PRN    bisacodyl (Dulcolax) 10 MG rectal suppository 10 mg  10 mg Rectal Daily PRN    fleet enema (Fleet) rectal enema 133 mL  1 enema Rectal Once PRN    ondansetron (Zofran) 4 MG/2ML injection 4 mg  4 mg Intravenous Q6H PRN    prochlorperazine (Compazine) 10 MG/2ML injection 5 mg  5 mg Intravenous Q8H PRN    piperacillin-tazobactam (Zosyn) 3.375 g in dextrose 5% 100 mL IVPB-ADDV  3.375 g Intravenous Q8H    nicotine (Nicoderm CQ) 14 MG/24HR patch 1 patch  1 patch Transdermal Daily    [COMPLETED] potassium chloride (Klor-Con M20) tab 40 mEq  40 mEq Oral Once    [COMPLETED] sodium chloride 0.9 % IV bolus 1,000 mL  1,000 mL Intravenous Once    [COMPLETED] gadoterate meglumine (Dotarem) 10 MMOL/20ML injection 20 mL  20 mL Intravenous ONCE PRN    [COMPLETED] morphINE PF 4 MG/ML injection 4 mg  4 mg Intravenous Once    [COMPLETED] ondansetron (Zofran) 4 MG/2ML injection 4 mg  4 mg Intravenous Once    [COMPLETED] sodium chloride 0.9 % IV bolus 1,000 mL  1,000 mL Intravenous Once    [COMPLETED] iopamidol 76% (ISOVUE-370) injection for power injector  70 mL  Intravenous ONCE PRN    [COMPLETED] piperacillin-tazobactam (Zosyn) 4.5 g in dextrose 5% 100 mL IVPB-ADDV  4.5 g Intravenous Once    [COMPLETED] sodium chloride 0.9 % IV bolus 2,163 mL  30 mL/kg Intravenous Once    [COMPLETED] fentaNYL (Sublimaze) 50 mcg/mL injection 50 mcg  50 mcg Intravenous Once   [4] No Known Allergies  [5]   Social History  Socioeconomic History    Marital status:    Tobacco Use    Smoking status: Every Day     Current packs/day: 0.50     Average packs/day: 0.5 packs/day for 29.0 years (14.5 ttl pk-yrs)     Types: Cigarettes    Smokeless tobacco: Never   Vaping Use    Vaping status: Never Used   Substance and Sexual Activity    Alcohol use: Not Currently     Comment: None currently     Drug use: Never   Other Topics Concern    Caffeine Concern No    Stress Concern No    Weight Concern No    Special Diet No    Exercise No    Seat Belt Yes     Social Drivers of Health     Food Insecurity: No Food Insecurity (6/15/2025)    NCSS - Food Insecurity     Worried About Running Out of Food in the Last Year: No     Ran Out of Food in the Last Year: No   Transportation Needs: No Transportation Needs (6/15/2025)    NCSS - Transportation     Lack of Transportation: No   Housing Stability: Not At Risk (6/15/2025)    NCSS - Housing/Utilities     Has Housing: Yes     Worried About Losing Housing: No     Unable to Get Utilities: No

## 2025-06-16 NOTE — PROGRESS NOTES
[facility]  Progress Note    June Goncalves Patient Status:  Inpatient    10/12/1965 MRN [id number]   Location [facility] 3NW-A Attending Britton Figueroa DO   Hosp Day # 1 PCP Karrie Orta DO     Subjective:  Patient is resting comfortably in bed.  She states that she feels overall about the same today.  She states pain is not worsening, though has not necessarily improved.  She states she feels well and comfortable after she takes Norco.  She is tolerating clear liquids without nausea or vomiting.  Passing flatus and having loose stool.  Denies any fevers or chills.    Objective/Physical Exam:  General: Alert, orientated x3.  Cooperative.  No apparent distress.  Vital Signs:  Blood pressure 90/64, pulse 73, temperature 97.9 °F (36.6 °C), temperature source Oral, resp. rate 16, height 64\", weight 162 lb 4.8 oz (73.6 kg), SpO2 99%, not currently breastfeeding.  Lungs: No respiratory distress.  Cardiac: Regular rate and rhythm. , with no rebound or guarding.  No peritoneal signs.   Extremities:  No lower extremity edema noted.        Labs:  Lab Results   Component Value Date    WBC 5.6 2025    HGB 11.1 2025    HCT 30.8 2025    .0 2025     Lab Results   Component Value Date    K 4.0 2025     Lab Results   Component Value Date    PT 10.7 2022    PT 11.7 (H) 2021    INR 1.18 2025    INR 1.1 2022    INR 1.1 2022       I/O last 3 completed shifts:  In: 3263 [I.V.:3163; IV PIGGYBACK:100]  Out: 700 [Urine:700]  I/O this shift:  In: 360 [P.O.:360]  Out: 55 [Urine:55]    Assessment  Problem List[1]      Diverticulitis with abscess versus left adnexal mass    Plan:  Continue trial of nonoperative management, including IV antibiotics  Afebrile, no leukocytosis.  Patient with mild hypotension.  Hold BP meds and continue to monitor.  IV fluids.  Continue clear liquid diet.  We discussed possible advancing to full liquids this afternoon if her pain  improves.  Ambulate and up to chair  DVT prophylaxis with SCDs.  Okay for prophylactic anticoagulation from a surgery standpoint, defer to primary  GI prophylaxis -add Protonix    Noemy Jimenez PA-C  6/16/2025  9:30 AM     This note was initiated by Noemy Jimenez.  The PA saw the patient in conjunction with me. The PA performed a history, exam, and developed the assessment and plan in conjunction with me. I agree with the above note and have made changes which reflect my own history and physical, if necessary.    Patient reports feeling about the same  Pain is still improved but still within the left lower quadrant  MRI was reviewed by me and shows persistently indeterminate left adnexal mass  Patient most likely has an abscess of her left ovary secondary to diverticulitis  She remains afebrile and otherwise hemodynamically stable  I recommend continued conservative treatment with IV antibiotics  Continue clear liquids  If patient has continued improvement of her abdominal pain, can advance to fulls  Surgery will continue to follow  Rest of care per primary    Fela Key MD  [facility]  6/16/2025  1:37 PM         [1]   Patient Active Problem List  Diagnosis    Generalized anxiety disorder    Hypertension    Current smoker    FH: colon cancer    Hiatal hernia    GERD (gastroesophageal reflux disease)    Insomnia secondary to anxiety    Alcoholic (HCC)    History of alcohol dependence (HCC)    Acute adjustment disorder with anxiety    Transaminitis    Fatty liver    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

## 2025-06-16 NOTE — PLAN OF CARE
Pt appears more comfortable since admission. Despite 1.5 L Bolus throughout day, Bps still continue to be low. Hospitalist notified as well as CBC ordered for am for Hgb level. Nicotine patch on R arm, IV Zosyn and fluids continue to infuse. Pt sleeping comfortably during most recent rounding.

## 2025-06-16 NOTE — SPIRITUAL CARE NOTE
Spiritual Care Visit Note    Patient Name: June Goncalves Date of Spiritual Care Visit: 25   : 10/12/1965 Primary Dx: Acute diverticulitis       Referred By: Referral From: Nurse, Patient    Spiritual Care Taxonomy:    Intended Effects: Convey a calming presence    Methods: Offer emotional support, Offer spiritual/Baptist support    Interventions: Acknowledge current situation, Active listening, Explain  role, Silent prayer    Visit Type/Summary:    - Spiritual Care: Consulted with RN prior to visit. Offered empathic listening and emotional support.June shared that she is fine; however when inquired her feelings related to this hospitalization, June shared that she felt \"overwhelmed.\" Processed her feelings. Invited patient to reflect on effective resources from the past for coping with the verbalized feelings. Patient shared that I \"breath.\" Introduced and shared some of the spiritual breathing and relaxation techniques. Promoted a sense of relief and a sacred space to release. June was very appreciative of compassionate listening presence and support. Established a supportive/caring relationship.      remains available for follow up. Provided information regarding how to contact Spiritual Care and left a Spiritual Care information card.    Spiritual Care support can be requested via an Epic consult.   For urgent/immediate needs, please contact the On Call  at: [facility]: ext [phone]    Rev. ALLISON Monroec., M.Div., M.T.S.,   Certified Grief Counseling Specialist  Advanced Practice Board Certified

## 2025-06-17 VITALS
OXYGEN SATURATION: 98 % | SYSTOLIC BLOOD PRESSURE: 107 MMHG | BODY MASS INDEX: 27.71 KG/M2 | DIASTOLIC BLOOD PRESSURE: 77 MMHG | HEIGHT: 64 IN | HEART RATE: 80 BPM | TEMPERATURE: 98 F | RESPIRATION RATE: 16 BRPM | WEIGHT: 162.31 LBS

## 2025-06-17 LAB
ACTIN SMOOTH MUSCLE AB: 7 UNITS
ADENOVIRUS F 40/41 PCR: NEGATIVE
ALBUMIN SERPL-MCNC: 3.2 G/DL (ref 3.2–4.8)
ALBUMIN/GLOB SERPL: 1.5 {RATIO} (ref 1–2)
ALP LIVER SERPL-CCNC: 213 U/L (ref 46–118)
ALT SERPL-CCNC: 56 U/L (ref 10–49)
ANION GAP SERPL CALC-SCNC: 9 MMOL/L (ref 0–18)
AST SERPL-CCNC: 74 U/L (ref ?–34)
ASTROVIRUS PCR: NEGATIVE
BASOPHILS # BLD AUTO: 0.03 X10(3) UL (ref 0–0.2)
BASOPHILS NFR BLD AUTO: 0.5 %
BILIRUB SERPL-MCNC: 2 MG/DL (ref 0.3–1.2)
BUN BLD-MCNC: 5 MG/DL (ref 9–23)
C CAYETANENSIS DNA SPEC QL NAA+PROBE: NEGATIVE
CALCIUM BLD-MCNC: 7.8 MG/DL (ref 8.7–10.6)
CAMPY SP DNA.DIARRHEA STL QL NAA+PROBE: NEGATIVE
CHLORIDE SERPL-SCNC: 112 MMOL/L (ref 98–112)
CO2 SERPL-SCNC: 20 MMOL/L (ref 21–32)
CREAT BLD-MCNC: 0.69 MG/DL (ref 0.55–1.02)
CRYPTOSP DNA SPEC QL NAA+PROBE: NEGATIVE
EAEC PAA PLAS AGGR+AATA ST NAA+NON-PRB: NEGATIVE
EC STX1+STX2 + H7 FLIC SPEC NAA+PROBE: NEGATIVE
EGFRCR SERPLBLD CKD-EPI 2021: 100 ML/MIN/1.73M2 (ref 60–?)
ENTAMOEBA HISTOLYTICA PCR: NEGATIVE
EOSINOPHIL # BLD AUTO: 0.03 X10(3) UL (ref 0–0.7)
EOSINOPHIL NFR BLD AUTO: 0.5 %
EPEC EAE GENE STL QL NAA+NON-PROBE: NEGATIVE
ERYTHROCYTE [DISTWIDTH] IN BLOOD BY AUTOMATED COUNT: 12.5 %
ETEC LTA+ST1A+ST1B TOX ST NAA+NON-PROBE: NEGATIVE
GIARDIA LAMBLIA PCR: NEGATIVE
GLOBULIN PLAS-MCNC: 2.2 G/DL (ref 2–3.5)
GLUCOSE BLD-MCNC: 104 MG/DL (ref 70–99)
HCT VFR BLD AUTO: 30.8 % (ref 35–48)
HGB BLD-MCNC: 10.7 G/DL (ref 12–16)
IMM GRANULOCYTES # BLD AUTO: 0.03 X10(3) UL (ref 0–1)
IMM GRANULOCYTES NFR BLD: 0.5 %
INR BLD: 1.3 (ref 0.8–1.2)
LYMPHOCYTES # BLD AUTO: 1.14 X10(3) UL (ref 1–4)
LYMPHOCYTES NFR BLD AUTO: 18.6 %
M2 MITOCHONDRIAL AB: <20 UNITS
MCH RBC QN AUTO: 38.1 PG (ref 26–34)
MCHC RBC AUTO-ENTMCNC: 34.7 G/DL (ref 31–37)
MCV RBC AUTO: 109.6 FL (ref 80–100)
MONOCYTES # BLD AUTO: 0.44 X10(3) UL (ref 0.1–1)
MONOCYTES NFR BLD AUTO: 7.2 %
NEUTROPHILS # BLD AUTO: 4.45 X10 (3) UL (ref 1.5–7.7)
NEUTROPHILS # BLD AUTO: 4.45 X10(3) UL (ref 1.5–7.7)
NEUTROPHILS NFR BLD AUTO: 72.7 %
NOROVIRUS GI/GII PCR: NEGATIVE
OSMOLALITY SERPL CALC.SUM OF ELEC: 290 MOSM/KG (ref 275–295)
P SHIGELLOIDES DNA STL QL NAA+PROBE: NEGATIVE
PLATELET # BLD AUTO: 148 10(3)UL (ref 150–450)
POTASSIUM SERPL-SCNC: 3.8 MMOL/L (ref 3.5–5.1)
POTASSIUM SERPL-SCNC: 3.8 MMOL/L (ref 3.5–5.1)
PROT SERPL-MCNC: 5.4 G/DL (ref 5.7–8.2)
PROTHROMBIN TIME: 16.3 SECONDS (ref 11.6–14.8)
RBC # BLD AUTO: 2.81 X10(6)UL (ref 3.8–5.3)
ROTAVIRUS A PCR: NEGATIVE
SALMONELLA DNA SPEC QL NAA+PROBE: NEGATIVE
SAPOVIRUS PCR: NEGATIVE
SHIGELLA SP+EIEC IPAH ST NAA+NON-PROBE: NEGATIVE
SODIUM SERPL-SCNC: 141 MMOL/L (ref 136–145)
V CHOLERAE DNA SPEC QL NAA+PROBE: NEGATIVE
VIBRIO DNA SPEC NAA+PROBE: NEGATIVE
WBC # BLD AUTO: 6.1 X10(3) UL (ref 4–11)
YERSINIA DNA SPEC NAA+PROBE: NEGATIVE

## 2025-06-17 PROCEDURE — 99239 HOSP IP/OBS DSCHRG MGMT >30: CPT | Performed by: HOSPITALIST

## 2025-06-17 PROCEDURE — 99232 SBSQ HOSP IP/OBS MODERATE 35: CPT | Performed by: STUDENT IN AN ORGANIZED HEALTH CARE EDUCATION/TRAINING PROGRAM

## 2025-06-17 RX ORDER — METRONIDAZOLE 500 MG/1
500 TABLET ORAL 3 TIMES DAILY
Qty: 24 TABLET | Refills: 0 | Status: SHIPPED | OUTPATIENT
Start: 2025-06-17 | End: 2025-06-17

## 2025-06-17 RX ORDER — LEVOFLOXACIN 750 MG/1
750 TABLET, FILM COATED ORAL DAILY
Qty: 8 TABLET | Refills: 0 | Status: SHIPPED | OUTPATIENT
Start: 2025-06-17 | End: 2025-06-17

## 2025-06-17 RX ORDER — METRONIDAZOLE 500 MG/1
500 TABLET ORAL 3 TIMES DAILY
Qty: 42 TABLET | Refills: 0 | Status: SHIPPED | OUTPATIENT
Start: 2025-06-17 | End: 2025-07-01

## 2025-06-17 RX ORDER — HYDROCODONE BITARTRATE AND ACETAMINOPHEN 5; 325 MG/1; MG/1
1 TABLET ORAL EVERY 4 HOURS PRN
Qty: 20 TABLET | Refills: 0 | Status: SHIPPED | OUTPATIENT
Start: 2025-06-17 | End: 2025-06-23

## 2025-06-17 RX ORDER — LEVOFLOXACIN 750 MG/1
750 TABLET, FILM COATED ORAL DAILY
Qty: 14 TABLET | Refills: 0 | Status: SHIPPED | OUTPATIENT
Start: 2025-06-17 | End: 2025-07-01

## 2025-06-17 NOTE — PROGRESS NOTES
A&Ox4. RA. VSS. Pain managed per MAR. Denies nausea and SOB. Tolerating clears. Safety measures in place. All questions and concerns addressed. Call light within reach. MRI screening complete.

## 2025-06-17 NOTE — PROGRESS NOTES
Alert & oriented x4. VSS on room air. Voids. GI panel (-). Tolerating diet. Ambulates independently. Denies nausea/chest pain/SOB. Pain controlled per MAR. Patient updated on plan of care. Questions and concerns addressed. Safety precautions in place. Frequent rounds performed.

## 2025-06-17 NOTE — PROGRESS NOTES
NURSING DISCHARGE NOTE    Discharged Home via Ambulatory.  Accompanied by Family member  Belongings Taken by patient/family.    IV removed. Discharge education, medication, and follow-ups reviewed with pt. All questions/concerns addressed and answered. Wheelchair offered, pt declined. Pt discharged in stable condition.

## 2025-06-17 NOTE — PROGRESS NOTES
[facility]  Obstetrics and Gynecology Consultation   Progress Note    June Goncalves Patient Status:  Inpatient    10/12/1965 MRN [id number]   Location [facility] 3NW-A Attending Britton Figueroa DO   Hospital Day 2 PCP Karrie Orta DO     Reason for Consultation: left adnexal mass      Subjective:     June Goncalves is a 59 year old  female who was admitted on 2025 with c/o left lower abdominal pain, found to have LLQ adnexal mass in setting of acute diverticulitis and elevated LFTs. The patient reports doing better. She does require PO pain medication but has decreased from 2 tablets to 1 tablet of Norco every 4 hours. Denies fever, chills, chest pain and SOB. Denies vaginal bleeding or abnormal vaginal discharge. Reports LLQ abdominal pain noted with activity but no pain at rest.       Objective:     Vitals:    25 0429   BP: 109/78   Pulse: 76   Resp: 18   Temp: 97.7 °F (36.5 °C)       General:  AAO. NAD.   CVS exam: not examined  Chest: not examined  Abdominal exam: soft, nontender, nondistended. No rebound or guarding.   Pelvic exam: deferred       Labs:  Lab Results   Component Value Date    WBC 6.1 2025    HGB 10.7 2025    HCT 30.8 2025    .0 2025    CREATSERUM 0.69 2025    BUN 5 2025     2025    K 3.8 2025    K 3.8 2025     2025    CO2 20.0 2025     2025    CA 7.8 2025    ALB 3.2 2025    ALKPHO 213 2025    BILT 2.0 2025    TP 5.4 2025    AST 74 2025    ALT 56 2025    INR 1.30 2025       Imaging:  FINDINGS:    LIVER:  Surface nodularity of the liver with relative atrophy of the caudate and lateral segment left lobe.  Areas of confluent hepatic fibrosis noted throughout the liver with reactive parenchymal edema of the lateral segment left lobe.  BILIARY:  Mild dilation of intrahepatic ducts of the lateral segment left lobe.   Cholecystectomy with expected prominence of the common bile duct.  PANCREAS:  No lesion, fluid collection, ductal dilatation, or atrophy.    SPLEEN:  Splenomegaly with craniocaudal length of 14 cm.  KIDNEYS:  No mass or obstruction.    ADRENALS:  No mass or enlargement.    AORTA/VASCULAR:  No aneurysm or dissection.  Upper abdominal varices noted.    RETROPERITONEUM:  No mass or adenopathy.    BOWEL/MESENTERY:  No visible mass, obstruction, or bowel wall thickening.    ABDOMINAL WALL:  No mass or hernia.    PELVIC NODES:  No pelvic lymphadenopathy.  PELVIC ORGANS:  Anteverted uterus.  Normal thickness and signal of the endometrium and junctional zone.  Normal right ovary.  Complex process of the left adnexa measures 3.8 x 3.8 x 4.8 cm.  It demonstrates mixed T2 signal with avid, heterogeneous  enhancement including central areas of cystic degeneration/necrosis.  This process abuts diverticula of the sigmoid colon, additionally has broad contact with the distal left ovarian vein.  This could represent a mass or abscess associated left ovary.    Diverticular abscess remains a possibility, as there is stranding and hyperemia about the adjacent sigmoid colon.  BONES:  No bony lesion or fracture.    LUNG BASES:  No visible pleural disease.  Lung bases not well assessed with MRI.    OTHER:  Small volume of free fluid within the abdomen and pelvis.                   Impression   CONCLUSION:       1. Cirrhotic liver morphology with confluent hepatic fibrosis and relative atrophy of the caudate and lateral segment left lobe.  This is not the typical pattern of cirrhosis as seen with chronic hepatocellular disease and may be a manifestation of  primary biliary cholangitis. Clinical and biochemical correlation advised.     2. Splenomegaly, additionally with small upper abdominal varices consistent with portal hypertension.     3. Complex process of the left adnexa (3.8 x 3.8 x 4.8 cm) demonstrates heterogeneous enhancement  with central areas of cystic degeneration/necrosis.  This demonstrates broad contact with the left ovarian vein as well as the mid sigmoid colon with  stranding/inflammation about the mid sigmoid.  Differential includes neoplasm or abscess of the left ovary versus diverticular abscess of the mid sigmoid colon.     LOCATION:  [facility]        Dictated by (CST): Rolly Kaiser MD on 6/15/2025 at 8:15 PM      Finalized by (CST): Rolly Kaiser MD on 6/15/2025 at 8:32 PM         Assessment:     June Goncalves is a 59 year old  female who was admitted on 2025 with c/o left lower abdominal pain, found to have LLQ adnexal mass in setting of acute diverticulitis and elevated LFTs.     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 Patient will need PO antibiotics on discharge.   - GI and GS on consult, appreciate recommendations  - Imaging reviewed and notable for complex process in left adnexa measuring 3.8 x 3.8 x 4.8 cm but difficult to say if this is gynecologic vs GI in origin  - Recommended repeat Pelvic MRI 48 hrs from prior but patient is clinically improving. Therefore, patient may defer further imaging as outpatient.   - Recommend tumor markers to rule out  malignant ovarian process: labs reviewed.  elevated.  - No acute surgical interventions indicated at this time  - recommend outpatient consult with gynecology oncology 2/2 complete left adnexal mass with elevated ca 125. D/w patient possible surgical management after resolution of infection/inflammation. D/w patient that resolution likely will require 2-3 weeks. Recommend to complete outpatient course of oral antibiotics. Recommend to follow up with outpatient gynecological care.   - pt has demonstrated clinical signs of improvement. Continue IV antibiotics and transition to PO prior to discharge. Patient may be discharged to home per gynecology. Will continue to follow peripherally.     All of the findings and plan were discussed with the patient.  She notes understanding and agrees with the plan of care.  All questions were answered to the best of my ability at this time.      Total patient time was 60 minutes in evaluation, consultation, and coordination of care. This included face to face and non-face to face actions. The patient's questions and concerns were addressed.      Norma Mas MD   [facility] - OBGYN        Discussed with patient that there will not be further notification of normal or benign results other than receiving results on GoTunes. A GoTunes message or telephone call will be placed by the physician and/or office staff if results are abnormal.     Note to patient and family   The 21st Century Cures Act makes medical notes available to patients in the interest of transparency.  However, please be advised that this is a medical document.  It is intended as [id number]-xuff communication.  It is written and medical language may contain abbreviations or verbiage that are technical and unfamiliar.  It may appear blunt or direct.  Medical documents are intended to carry relevant information, facts as evident, and the clinical opinion of the practitioner.        This note could include assistance by  Dragon voice recognition. Errors in content may be related to improper recognition by the system; efforts to review and correct have been done but errors may still exist.

## 2025-06-17 NOTE — DISCHARGE SUMMARY
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 Pt went to a [facility] 6 days ago and was started on Augmentin. Pt reports that the pain did not improve. No fevers, chills, nausea, vomiting, or diarrhea. No hmeatochezia, melena or hematuria. No chest pain, SOB, palpitations.     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      Procedures during hospitalization:   None    Incidental or significant findings and recommendations (brief descriptions):  None    Lab/Test results pending at Discharge:   None    Consultants:  General surgery  GI  Gynecology     Discharge Medication List:     Discharge Medications        START taking these medications        Instructions Prescription details   HYDROcodone-acetaminophen 5-325 MG Tabs  Commonly known as: Norco      Take 1  tablet by mouth every 4 (four) hours as needed for Pain.   Quantity: 20 tablet  Refills: 0     levoFLOXacin 750 MG Tabs  Commonly known as: Levaquin      Take 1 tablet (750 mg total) by mouth daily for 8 days.   Stop taking on: June 25, 2025  Quantity: 8 tablet  Refills: 0     metroNIDAZOLE 500 MG Tabs  Commonly known as: Flagyl      Take 1 tablet (500 mg total) by mouth 3 (three) times daily for 8 days.   Stop taking on: June 25, 2025  Quantity: 24 tablet  Refills: 0            CONTINUE taking these medications        Instructions Prescription details   hydrOXYzine 10 MG Tabs  Commonly known as: Atarax      Take 1-2 tablets (10-20 mg total) by mouth every 8 (eight) hours as needed for Anxiety.   Quantity: 180 tablet  Refills: 0     omeprazole 20 MG Cpdr  Commonly known as: PriLOSEC      TAKE ONE CAPSULE (20 MG TOTAL) BY MOUTH ONCE DAILY, 30 MINUTES PRIOR TO BREAKFAST.   Quantity: 90 capsule  Refills: 0     Probiotic Pearls Caps      Take 1 capsule by mouth in the morning.   Refills: 0     traZODone 100 MG Tabs  Commonly known as: Desyrel      TAKE 1.5 TABLETS BY MOUTH NIGHTLY.   Quantity: 45 tablet  Refills: 1            STOP taking these medications      busPIRone 5 MG Tabs  Commonly known as: Buspar        losartan 50 MG Tabs  Commonly known as: Cozaar        meclizine 25 MG Tabs  Commonly known as: Antivert        phenazopyridine 100 MG Tabs  Commonly known as: Pyridium        tiZANidine 2 MG Tabs  Commonly known as: Zanaflex                  Where to Get Your Medications        [id number] [address] AT [facility] [address], [phone], [phone]  [address] [address]      Hours: 24-hours Phone: [phone]   HYDROcodone-acetaminophen 5-325 MG Tabs  levoFLOXacin 750 MG Tabs  metroNIDAZOLE 500 MG Tabs         [place]    Follow-up appointment:   Norma Mas MD  [address]  [facility] [address]  [phone]    Follow up in 1 week(s)      Alex UsmanDO  [address] [facility] [address], [address] [address]  [phone]    Follow up in 1 week(s)      Appointments for Next 30 Days 2025 - 2025      None            Vital signs:  Temp:  [97.7 °F (36.5 °C)-98 °F (36.7 °C)] 97.9 °F (36.6 °C)  Pulse:  [65-84] 80  Resp:  [16-18] 16  BP: ()/(69-78) 107/77  SpO2:  [94 %-98 %] 98 %    Physical Exam:    General: No acute distress   Lungs: clear to auscultation  Cardiovascular: S1, S2  Abdomen: Soft    -----------------------------------------------------------------------------------------------  PATIENT DISCHARGE INSTRUCTIONS: See electronic chart    Britton Figueroa DO    Total time spent on discharge plannin minutes     The  Century Cures Act makes medical notes like these available to patients in the interest of transparency. Please be advised this is a medical document. Medical documents are intended to carry relevant information, facts as evident, and the clinical opinion of the practitioner. The medical note is intended as peer to peer communication and may appear blunt or direct. It is written in medical language and may contain abbreviations or verbiage that are unfamiliar.

## 2025-06-17 NOTE — PROGRESS NOTES
[facility]  Progress Note    June Goncalves Patient Status:  Inpatient    10/12/1965 MRN [id number]   Location [facility] 3NW-A Attending Britton Figueroa DO   Hosp Day # 2 PCP Karrie Orta DO     Subjective:  Patient seen resting at bedside.  She endorses improvement of abdominal pain.  She is tolerating clear liquid diet without nausea and vomiting.  She continues to pass flatus and had a bowel movement this morning.    Objective/Physical Exam:  General: Alert, orientated x3.  Cooperative.  No apparent distress.  Vital Signs:  Blood pressure 107/77, pulse 80, temperature 97.9 °F (36.6 °C), temperature source Oral, resp. rate 16, height 64\", weight 162 lb 4.8 oz (73.6 kg), SpO2 98%, not currently breastfeeding.  Wt Readings from Last 3 Encounters:   06/15/25 162 lb 4.8 oz (73.6 kg)   24 154 lb (69.9 kg)   10/14/22 155 lb (70.3 kg)     Lungs: No respiratory distress.  Cardiac: Regular rate and rhythm.   Abdomen:  Soft, non distended, non tender, with no rebound or guarding.  No peritoneal signs.   Extremities:  No lower extremity edema noted.      Intake/Output:    Intake/Output Summary (Last 24 hours) at 2025 1244  Last data filed at 2025 1209  Gross per 24 hour   Intake 3610 ml   Output 750 ml   Net 2860 ml     I/O last 3 completed shifts:  In: 4330 [P.O.:1040; I.V.:3290]  Out: 855 [Urine:855]  I/O this shift:  In: 120 [P.O.:120]  Out: 250 [Urine:250]    Medications: Scheduled Medications[1]    Labs:  Lab Results   Component Value Date    WBC 6.1 2025    HGB 10.7 2025    HCT 30.8 2025    .0 2025     Lab Results   Component Value Date     2025    K 3.8 2025    K 3.8 2025     2025    CO2 20.0 2025    BUN 5 2025    CREATSERUM 0.69 2025     2025    CA 7.8 2025    ALKPHO 213 2025    ALT 56 2025    AST 74 2025    BILT 2.0 2025    ALB 3.2 2025    TP 5.4  06/17/2025     Lab Results   Component Value Date    PT 10.7 09/30/2022    PT 11.7 (H) 07/29/2021    INR 1.30 (H) 06/17/2025    INR 1.33 (H) 06/16/2025    INR 1.18 06/14/2025         Assessment  Problem List[2]    Diverticulitis with abscess versus left adnexal mass    Plan:  Advance to full liquid diet.  Multimodal pain control with Tylenol, and as needed morphine.   Ambulate and up to chair.  DVT prophylaxis with SCDs.  Okay for prophylactic anticoagulation from surgical standpoint, defer to primary.  GI prophylaxis with Protonix.  Medical management per hospitalist and OB/GYN.  Plan for MRI abdomen/pelvis per OB/GYN.    Quality:  DVT Mechanical Prophylaxis:   SCDs,    DVT Pharmacologic Prophylaxis   Medication   None                Code Status: Full Code  Steele: No urinary catheter in place  Steele Duration (in days):   Central line:    CAROL:         FLETCHER Mcgee  6/17/2025  12:44 PM        This note was initiated by Marcela Carroll.  The PA saw the patient in conjunction with me. The PA performed a history, exam, and developed the assessment and plan in conjunction with me. I agree with the above note and have made changes which reflect my own history and physical, if necessary.    Pain is improved  Patient is tolerating full liquids without any nausea or vomiting  She is having flatus and bowel movements  Can advance to soft  If tolerating soft diet, can discharge home  Patient will need 14 days of antibiotics  Patient will follow-up with me in clinic in the next month    Fela Key MD  [facility]  6/17/2025  5:22 PM             [1]    pantoprazole  40 mg Intravenous Q24H    traZODone  150 mg Oral Nightly    piperacillin-tazobactam  3.375 g Intravenous Q8H    nicotine  1 patch Transdermal Daily   [2]   Patient Active Problem List  Diagnosis    Generalized anxiety disorder    Hypertension    Current smoker    FH: colon cancer    Hiatal hernia    GERD (gastroesophageal reflux disease)     Insomnia secondary to anxiety    Alcoholic (HCC)    History of alcohol dependence (HCC)    Acute adjustment disorder with anxiety    Transaminitis    Fatty liver    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

## 2025-06-17 NOTE — PROGRESS NOTES
[facility]                       Gastroenterology Follow up Note - [facility]    June Goncalves Patient Status:  Inpatient    10/12/1965 MRN [id number]   Location [facility] 3NW-A Attending Britton Figueroa DO   Hosp Day # 2 PCP Karrie Orta DO     Reason for consultation: Imaging suggesting cirrhosis and rule out PBC.  Diverticulitis with abscess  Subjective: Patient seen and examined.  She notes her abdominal pain discomfort has been improving.  Review of Systems:   10 point ROS completed and was negative, except for pertinent positive and negatives stated in subjective.    For PMH, PSH, FHx and SHx- please see initial consult note.     Objective: /78 (BP Location: Left arm)   Pulse 76   Temp 97.7 °F (36.5 °C) (Oral)   Resp 18   Ht 5' 4\" (1.626 m)   Wt 162 lb 4.8 oz (73.6 kg)   SpO2 94%   BMI 27.86 kg/m²   Gen: No acute distress  Resp: no respiratory distress  Abd: Soft, non-tender, non-distended. No rebound tenderness, no guarding.   Neuro: Aox3.     Labs:   Lab Results   Component Value Date    WBC 6.1 2025    HGB 10.7 2025    HCT 30.8 2025    .0 2025    CREATSERUM 0.69 2025    BUN 5 2025     2025    K 3.8 2025    K 3.8 2025     2025    CO2 20.0 2025     2025    CA 7.8 2025    ALB 3.2 2025    ALKPHO 213 2025    BILT 2.0 2025    AST 74 2025    ALT 56 2025    INR 1.30 2025    PTP 16.3 2025     Recent Labs   Lab 25  0515 25  1124 25  0510   GLU 95 171* 104*   BUN 8* 7* 5*   CREATSERUM 0.85 0.86 0.69   CA 7.8* 7.8* 7.8*    138 141   K 4.0  4.0 3.4* 3.8  3.8    109 112   CO2 21.0 19.0* 20.0*     Recent Labs   Lab 25  0510   RBC 2.81*   HGB 10.7*   HCT 30.8*   .6*   MCH 38.1*   MCHC 34.7   RDW 12.5   NEPRELIM 4.45   WBC 6.1   .0*       Recent Labs   Lab 25  0515  06/16/25  1124 06/17/25  0510   ALT 64* 67* 56*   AST 90* 96* 74*       Assessment:  Impression: 59 yr-old female with hx of HTN, anxiety, depression, and active EtOH abuse (chronic EtOH use since age of 18, heavy use and dependence around 2014 which required rehab, followed by a period of sobriety and most recently decreased to \"wine only\" drinking 6 glasses of wine daily) admitted 6/14 with 1 week of lower abd pain thought d/t diverticulitis which failed to improve with outpt course of Augmentin and was then found to have diverticulitis with abscess versus adnexal mass.  Her abdominal pain at this time is improving.  GI consulted as imaging suggests cirrhotic liver morphology with confluent hepatic fibrosis and relative atrophy of the caudate + lateral segment left lobe (? PBC), splenomegaly with small upper abd varices, and complex left adnexa  Will follow up expanded liver serology to assess for causes of cirrhosis including PBC.  Currently patient without signs of HE or overt GI bleeding. MELD-Na 22  and Child-Herr Score 7/Child Class B on arrival.  Recommendations:      Follow-up autoimmune liver serologies to assess for PBC  Follow-up pancreatic elastase.  GI PCR panel negative  Continue ABX per general surgery recommendations.  Consider IR abscess drain if needed by general surgery.  If her symptoms do not improve and surgery is needed, then would consider transfer to tertiary care center given her cirrhosis.  Continue to advance diet per general surgery recommendations.  DVT prophylaxis per hospitalist recommendations  Pain management per hospitalist recommendations limiting narcotics as able; antiemetics as needed  If acute change in mentation noted contact GI  Monitor for overt GI bleeding  Consider repeat EGD once acute issues with resolved for variceal surveillance and will likely also need repeat colonoscopy as outpatient once diverticulitis episode improves  Continued alcohol cessation  CBC, CMP, INR  in a.m. correcting electrolytes per hospitalist recommendations       Thank you for allowing us to participate in patient's care. Please call us with any questions or concerns.  The GI consult service will continue to follow.     Fabian Navas DO  [facility] Gastroenterology  [phone]

## 2025-06-18 ENCOUNTER — PATIENT OUTREACH (OUTPATIENT)
Dept: CASE MANAGEMENT | Age: 60
End: 2025-06-18

## 2025-06-18 LAB
HE4: 122 PMOL/L
PANCREATIC ELAST FECAL: 194 UG ELAST./G

## 2025-06-19 ENCOUNTER — PATIENT MESSAGE (OUTPATIENT)
Dept: FAMILY MEDICINE CLINIC | Facility: CLINIC | Age: 60
End: 2025-06-19

## 2025-06-19 LAB
DSDNA IGG SERPL IA-ACNC: 1.2 IU/ML (ref ?–10)
ENA AB SER QL IA: 0.2 UG/L (ref ?–0.7)
ENA AB SER QL IA: NEGATIVE

## 2025-06-19 NOTE — PAYOR COMM NOTE
--------------  DISCHARGE REVIEW    Payor: [employer] OPEN ACCESS   Subscriber #:  [id number]  Authorization Number: [id number]    Admit date: 6/15/25  Admit time:   1:09 AM  Discharge Date: 2025  4:27 PM     Admitting Physician: Wenceslao Matute DO  Attending Physician:  Dank Jacome MD  Primary Care Physician: Karrie Orta DO          Discharge Summary Notes        Discharge Summary signed by Britton Figueroa DO at 2025  1:55 PM       Author: Britton Figueroa DO Specialty: HOSPITALIST Author Type: Physician    Filed: 2025  1:55 PM Date of Service: 2025  1:52 PM Status: Signed    : Britton Figueroa DO (Physician)           [facility]     June Goncalves Patient Status:  Inpatient    10/12/1965 MRN [id number]   Location [facility] 3N-A Attending Britton Figueroa DO   Hosp Day # 2 PCP Karrie Orta DO     Date of Admission: 2025  Date of Discharge:   2025    Discharge Disposition: Home or Self Care    Discharge Diagnosis:  Left adnexal lesion  Sigmoid colitis  Cirrhosis due to ETOH  Elevated LFTs    History of Present Illness: June Goncalves is a 59 year old female with hx of hypertension, anxiety presents to the ER with lower abdominal pain that started a week ago. Pt went to a [facility] care 6 days ago and was started on Augmentin. Pt reports that the pain did not improve. No fevers, chills, nausea, vomiting, or diarrhea. No hmeatochezia, melena or hematuria. No chest pain, SOB, palpitations.     Brief Synopsis:     #Left adnexal lesion  -MRI reviewed   -Tumor markers elevated  -Gyn eval noted - OP f/u with gyn/onc recommended - information provided in DC paperwork      #Sigmoid colitis likely due to #1  -Continue empiric antibiotics  -No acute surgical intervention per general surgery   -Pain control      #Cirrhosis due to ETOH  #Elevated LFTs  -MRI raising concern for PBC - GI eval noted   -F/u autoimmune serologies   -LFTs  stable    #Disposition  -Stable for DC home     Lace+ Score: 55  59-90 High Risk  29-58 Medium Risk  0-28   Low Risk       TCM Follow-Up Recommendation:  LACE 29-58: Moderate Risk of readmission after discharge from the hospital.      Procedures during hospitalization:   None    Incidental or significant findings and recommendations (brief descriptions):  None    Lab/Test results pending at Discharge:   None    Consultants:  General surgery  GI  Gynecology     Discharge Medication List:     Discharge Medications        START taking these medications        Instructions Prescription details   HYDROcodone-acetaminophen 5-325 MG Tabs  Commonly known as: Norco      Take 1 tablet by mouth every 4 (four) hours as needed for Pain.   Quantity: 20 tablet  Refills: 0     levoFLOXacin 750 MG Tabs  Commonly known as: Levaquin      Take 1 tablet (750 mg total) by mouth daily for 8 days.   Stop taking on: June 25, 2025  Quantity: 8 tablet  Refills: 0     metroNIDAZOLE 500 MG Tabs  Commonly known as: Flagyl      Take 1 tablet (500 mg total) by mouth 3 (three) times daily for 8 days.   Stop taking on: June 25, 2025  Quantity: 24 tablet  Refills: 0            CONTINUE taking these medications        Instructions Prescription details   hydrOXYzine 10 MG Tabs  Commonly known as: Atarax      Take 1-2 tablets (10-20 mg total) by mouth every 8 (eight) hours as needed for Anxiety.   Quantity: 180 tablet  Refills: 0     omeprazole 20 MG Cpdr  Commonly known as: PriLOSEC      TAKE ONE CAPSULE (20 MG TOTAL) BY MOUTH ONCE DAILY, 30 MINUTES PRIOR TO BREAKFAST.   Quantity: 90 capsule  Refills: 0     Probiotic Pearls Caps      Take 1 capsule by mouth in the morning.   Refills: 0     traZODone 100 MG Tabs  Commonly known as: Desyrel      TAKE 1.5 TABLETS BY MOUTH NIGHTLY.   Quantity: 45 tablet  Refills: 1            STOP taking these medications      busPIRone 5 MG Tabs  Commonly known as: Buspar        losartan 50 MG Tabs  Commonly known as:  Cozaar        meclizine 25 MG Tabs  Commonly known as: Antivert        phenazopyridine 100 MG Tabs  Commonly known as: Pyridium        tiZANidine 2 MG Tabs  Commonly known as: Zanaflex                  Where to Get Your Medications        These medications were sent to [id number] - [place], [place] - [address] AT [facility] OF [address], [phone], [phone] [address], [facility] [address]      Hours: 24-hours Phone: [phone]   HYDROcodone-acetaminophen 5-325 MG Tabs  levoFLOXacin 750 MG Tabs  metroNIDAZOLE 500 MG Tabs         [place]    Follow-up appointment:   Norma Mas MD  [address] [address] [phone]    Follow up in 1 week(s)      Usman Johnson DO  [facility] [address], [facility] [address]  [facility] [address] [phone]    Follow up in 1 week(s)      Appointments for Next 30 Days 2025 - 2025      None            Vital signs:  Temp:  [97.7 °F (36.5 °C)-98 °F (36.7 °C)] 97.9 °F (36.6 °C)  Pulse:  [65-84] 80  Resp:  [16-18] 16  BP: ()/(69-78) 107/77  SpO2:  [94 %-98 %] 98 %    Physical Exam:    General: No acute distress   Lungs: clear to auscultation  Cardiovascular: S1, S2  Abdomen: Soft    -----------------------------------------------------------------------------------------------  PATIENT DISCHARGE INSTRUCTIONS: See electronic chart    Britton Figueroa DO    Total time spent on discharge plannin minutes     The  Cures Act makes medical notes like these available to patients in the interest of transparency. Please be advised this is a medical document. Medical documents are intended to carry relevant information, facts as evident, and the clinical opinion of the practitioner. The medical note is intended as peer to peer communication and may appear blunt or direct. It is written in medical language and may contain abbreviations or verbiage that are unfamiliar.        Electronically signed by Britton Figueroa DO on 6/17/2025  1:55 PM         REVIEWER COMMENTS

## 2025-06-19 NOTE — PROGRESS NOTES
Aida,  Mild-moderate pancreatic insufficiency please discuss with gastroenterology  Elevated HE4 protein discuss with gynecology/oncology see information given to you by gynecologist you are to make an appointment with Dr. Usman Johnson [phone]  Connective tissue disease testing negative so far blood cultures have been negative.    Sincerely,   Darlyn Carrillo PA-C

## 2025-06-20 ENCOUNTER — TELEPHONE (OUTPATIENT)
Dept: OBGYN CLINIC | Facility: CLINIC | Age: 60
End: 2025-06-20

## 2025-06-20 LAB — A-1-ANTITRYPSIN: 244 MG/DL

## 2025-06-20 NOTE — TELEPHONE ENCOUNTER
Pt was seen in the ER on 06/14/2025 and was advised to call our office to schedule f/u appointment. Please advise, thank you.

## 2025-06-20 NOTE — TELEPHONE ENCOUNTER
I spoke with patient, hospital follow up scheduled for 6/25 with Dr. Garrett in [place]. Patient expresses understanding.

## 2025-06-21 ENCOUNTER — TELEPHONE (OUTPATIENT)
Dept: OBGYN CLINIC | Facility: CLINIC | Age: 60
End: 2025-06-21

## 2025-06-21 DIAGNOSIS — N94.89 ADNEXAL MASS: Primary | ICD-10-CM

## 2025-06-21 NOTE — TELEPHONE ENCOUNTER
Called and left VM for pt regarding lab work completed during inpatient stay.  Left office callback number.    Odalis Villasenor DO  [facility] - OBGYN  6/21/2025 9:31 AM

## 2025-06-23 DIAGNOSIS — K57.92 ACUTE DIVERTICULITIS: ICD-10-CM

## 2025-06-23 NOTE — TELEPHONE ENCOUNTER
Pt called to make providers office aware of the following appt made with Dr. LUAN Heath for Wednesday 7/2/25 at 11:00 am

## 2025-06-23 NOTE — TELEPHONE ENCOUNTER
Asking if patient should keep appt on 6/25/25 since she was referred to oncology.  Patient has not scheduled consult with Dr. Johnson.  Instructed to schedule appt with Dr. Johnson, then to call office to let us know when she will be seen.    Routed to -  Please obtain scheduling details when patient returns call.  Send TE to provider to see if patient should keep 6/25/25 appt.

## 2025-06-24 ENCOUNTER — TELEPHONE (OUTPATIENT)
Dept: FAMILY MEDICINE CLINIC | Facility: CLINIC | Age: 60
End: 2025-06-24

## 2025-06-24 RX ORDER — HYDROCODONE BITARTRATE AND ACETAMINOPHEN 5; 325 MG/1; MG/1
1 TABLET ORAL EVERY 8 HOURS PRN
Qty: 20 TABLET | Refills: 0 | Status: SHIPPED | OUTPATIENT
Start: 2025-06-24

## 2025-06-24 NOTE — TELEPHONE ENCOUNTER
Requested Prescriptions     Pending Prescriptions Disp Refills    HYDROcodone-acetaminophen 5-325 MG Oral Tab 20 tablet 0     Sig: Take 1 tablet by mouth every 4 (four) hours as needed for Pain.       Last Refill: 6/17 last filled by Dr Figueroa.    Last OV: 2/14/24    Patient has ob gyn appointment 6/25. Gyne onc 7/2. Please also see MCM from patient.

## 2025-06-24 NOTE — TELEPHONE ENCOUNTER
June needs refill. She got out of [facility] on Tuesday. She needs   HYDROcodone-acetaminophen 5-325 MG Oral Tab . It was not ordered by Darlyn Carrillo PA-C. Usman Johnson said that her primary care provider would help with medication before seeing him on July 2nd. Please advise.     [id number] - [place], [place] - [address] AT [facility] OF [address] [facility], [phone], [phone]   [facility] [address] [facility] [address]   Phone: [phone] Fax: [phone]   Hours: Open 24 hours

## 2025-07-02 ENCOUNTER — PATIENT MESSAGE (OUTPATIENT)
Dept: FAMILY MEDICINE CLINIC | Facility: CLINIC | Age: 60
End: 2025-07-02

## 2025-07-03 DIAGNOSIS — I10 PRIMARY HYPERTENSION: ICD-10-CM

## 2025-07-03 RX ORDER — LOSARTAN POTASSIUM 50 MG/1
75 TABLET ORAL DAILY
Qty: 45 TABLET | Refills: 0 | OUTPATIENT
Start: 2025-07-03

## 2025-07-08 ENCOUNTER — OFFICE VISIT (OUTPATIENT)
Facility: CLINIC | Age: 60
End: 2025-07-08
Payer: COMMERCIAL

## 2025-07-08 VITALS
BODY MASS INDEX: 27 KG/M2 | DIASTOLIC BLOOD PRESSURE: 90 MMHG | SYSTOLIC BLOOD PRESSURE: 128 MMHG | OXYGEN SATURATION: 98 % | RESPIRATION RATE: 16 BRPM | WEIGHT: 156 LBS | HEART RATE: 109 BPM

## 2025-07-08 DIAGNOSIS — K57.92 ACUTE DIVERTICULITIS: Primary | ICD-10-CM

## 2025-07-08 PROCEDURE — 99205 OFFICE O/P NEW HI 60 MIN: CPT | Performed by: SURGERY

## 2025-07-08 RX ORDER — HYDROCODONE BITARTRATE AND ACETAMINOPHEN 5; 325 MG/1; MG/1
1-2 TABLET ORAL EVERY 4 HOURS PRN
Qty: 20 TABLET | Refills: 0 | Status: SHIPPED | OUTPATIENT
Start: 2025-07-08 | End: 2025-07-14

## 2025-07-08 NOTE — PATIENT INSTRUCTIONS
Surgery: Xi Robot-assisted, laparoscopic, possible open low anterior resection, joint Dr. Johnson    Date of Surgery: TBD    Surgery Length: 3 hours    Anesthesia: [facility]    Hospital:    [facility]- [address] [facility], [place] [address]   Phone: [phone]. Pre-Admission [phone].    This is an inpatient procedure.  Use the provided Chlorhexadine surgical soap(instructions attached) to shower the night before and morning of your procedure.  Do not apply powders, creams, lotions or deodorant after showering.  Do not apply any kind of makeup and make sure to remove nail polish prior to your surgery.  For faster recovery from anesthesia and surgery please follow the instructions below regarding your pre-op diet:    Bowel Prep diet and instructions for colon surgery:  The day before surgery you may have a small breakfast and then start a clear liquid diet. This diet includes: clear beverages, clear soup or broth, and Jell-O. (No red, blue, or purple liquid)  A liquid bowel preparation will be called into your pharmacy. You must drink this preparation the day before surgery to clean out your colon. Follow the directions for the prep on the prescription and begin to take the prep at 12 pm the day prior to surgery.  You will also need to take 2 oral antibiotics the day before your surgery to reduce the risk of infection. You will get a prescription for these or they will be sent electronically to your pharmacy. The antibiotics include: Neomycin 1gram and Flagyl 500mg. You will take these as directed at 3pm, 4pm, and 10pm the day before surgery.   10 pm the night prior to your surgery time you are to drink one 10oz bottle of Ensure Pre-Surgery Drink. You are to have NO solid food or water after 11pm the night before your surgery EXCEPT one additional 10oz bottle of Ensure Pre-Surgery Drink. You need to finish drinking this 4 hours prior to surgery time.  Take Tylenol 1000mg by mouth at the  time of your second Ensure Pre-Surgery drink(4hrs prior to surgery time), unless instructed otherwise by your surgeon.     Bring your picture ID and insurance card with you.  Wear comfortable clothing that can easily be removed. Preferably, something that zips, snaps, or buttons up the front.   You will be contacted by the hospital the day prior to your surgery to confirm details and give you specific instructions about when and where to arrive the day of your procedure.   If you are taking blood thinners including: Plavix, Eliquis, Coumadin you will need to contact the prescribing provider for specific instructions on holding these medications for your procedure  Motrin, Advil, Ibuprofen, Aspirin, Baby Aspirin and Fish Oil are also blood thinners and need to be held at least one week prior to your procedure. It is okay to take Tylenol.  Inform your primary care physician of your surgery and ask if him/her will need to see you prior to surgery.    Pre-Operative Testing  x CBC x CMP  BMP    PT, PTT, INR  UA x EKG    Chest X-Ray  Cardiac Clearance x H & P Medical Clearance       Does patient have pacemaker: [] YES [x] No Does patient have NERISSA:  [] YES [x] No  Is patient diabetic?  [] YES    [x] NO    Please call PCP/specialty physician to schedule pre-op exam for medical clearance. PCP/Specialty physician to fax clearance 2 weeks prior to surgery.     Bernardino Cano MD  [facility] General Surgical Oncology  System Medical Director of Surgical Services  [facility]    For Dr. Cano's office: [phone]/ Fax: [phone]  After hours you will reach the answering service    Dr. Cano's nurse; Keli RN:  [phone] (voicemail)  Monday through Friday 8:00am to 4:30pm.      Central [phone]  Medical Records:   [phone]

## 2025-07-11 ENCOUNTER — PATIENT MESSAGE (OUTPATIENT)
Dept: FAMILY MEDICINE CLINIC | Facility: CLINIC | Age: 60
End: 2025-07-11

## 2025-07-11 ENCOUNTER — TELEPHONE (OUTPATIENT)
Dept: OBGYN CLINIC | Facility: CLINIC | Age: 60
End: 2025-07-11

## 2025-07-11 ENCOUNTER — DOCUMENTATION ONLY (OUTPATIENT)
Dept: SURGERY | Facility: CLINIC | Age: 60
End: 2025-07-11

## 2025-07-11 NOTE — CONSULTS
[facility] Surgical Oncology and Breast [facility]      Patient Name:  June Goncalves   YOB: 1965   Gender:  Female   Appt Date:  7/8/2025   Provider:  Bernardino Cano MD   Insurance:  [employer] OPEN ACCESS      PATIENT PROVIDERS  Referring Provider: No ref. provider found   Address: No referring provider defined for this encounter.   Phone #: N/A    Primary Care Provider:Karrie Orta DO   Address: [address] [place] [address]   Phone #: [phone]       CHIEF COMPLAINT  Chief Complaint   Patient presents with    New Patient        PROBLEMS  Reviewed Problem List[1]     History of Present Illness:  June Goncalves is a 59 year old female with PMHx hypertension, anxiety, diverticulitis, cirrhotic liver, portal hypertension, alcohol use disorder, laparoscopic cholecystectomy, who is referred by Dr. Johnson to render an opinion regarding the surgical management of acute diverticulitis.     Patient presented to [facility] emergency department on 6/14 with lower abdominal pain for one week. Denied fever, chills, nausea, vomiting, diarrhea, melena at that time. Patient was seen in immediate care 6 days prior and started on antibiotics for diverticulitis without improvement. CT  abdomen pelvis was obtained and shows what appears to be diverticulitis along with the heterogenous mass near the left adnexa, concerning for possible abscess. Liver enzymes elevated and bilirubin elevated at 3.1 at that time. Pelvic ultrasound on 6/14 showed left adnexal structure that is heterogeneous/hypoechoic measures 4.6 x 3.6 x 2.1 cm. MRI abdomen pelvis on 6/15 shows complex process of the left adnexa (3.8 x 3.8 x 4.8 cm) demonstrating heterogeneous enhancement with central areas of cystic degeneration/necrosis.  Has broad contact with the left ovarian vein as well as the mid sigmoid colon with stranding/inflammation about the mid sigmoid. CEA 1/16 was 3.2,  elevated at 114, AFP 5.6. Patient was discharged  in stable condition on PO antibiotics levofloxacin and metronidazole completed on June 25th.          Vital Signs:  /90 (BP Location: Left arm, Patient Position: Sitting)   Pulse 109   Resp 16   Wt 70.8 kg (156 lb)   SpO2 98%   BMI 26.78 kg/m²      Medications Reviewed:  Medications - Current[2]     Allergies Reviewed:  Allergies[3]     History:  Reviewed:  Past Medical History[4]   Reviewed:  Past Surgical History[5]   Reviewed Social History:  Social Hx on file[6]   Reviewed:  Family History[7]     Review of Systems:  Review of Systems   Constitutional:  Negative for activity change, appetite change, chills, fatigue, fever and unexpected weight change.   HENT:  Negative for congestion and trouble swallowing.    Eyes:  Negative for discharge and redness.   Respiratory:  Negative for cough, chest tightness and shortness of breath.    Cardiovascular:  Negative for chest pain and leg swelling.   Gastrointestinal:  Negative for abdominal distention, abdominal pain, nausea and vomiting.   Endocrine: Negative for polydipsia and polyuria.   Genitourinary:  Negative for difficulty urinating and dysuria.   Musculoskeletal:  Negative for myalgias.   Skin:  Negative for color change and pallor.   Allergic/Immunologic: Negative for immunocompromised state.   Neurological:  Negative for syncope and weakness.   Hematological:  Does not bruise/bleed easily.   Psychiatric/Behavioral:  Negative for agitation and confusion.         Physical Examination:  Physical Exam  Constitutional:       Appearance: She is well-developed.   HENT:      Head: Normocephalic and atraumatic.   Eyes:      General: No scleral icterus.     Pupils: Pupils are equal, round, and reactive to light.   Cardiovascular:      Rate and Rhythm: Normal rate and regular rhythm.      Heart sounds: No murmur heard.  Pulmonary:      Effort: Pulmonary effort is normal. No respiratory distress.   Abdominal:      General: There is no distension.      Palpations:  Abdomen is soft.      Tenderness: There is no abdominal tenderness.   Musculoskeletal:         General: Normal range of motion.      Cervical back: Normal range of motion.   Skin:     General: Skin is warm and dry.   Neurological:      Mental Status: She is alert and oriented to person, place, and time.   Psychiatric:         Mood and Affect: Mood normal.         Behavior: Behavior normal. Behavior is cooperative.        Study Result    Narrative   PROCEDURE:  CT ABDOMEN+PELVIS (CONTRAST ONLY) (CPT=74177)     COMPARISON:  [facility], CT, CT ABDOMEN+PELVIS(CONTRAST ONLY)(CPT=74177), 1/16/2019, 4:18 PM.     INDICATIONS:  is on abx for diverticulitis but pain continues     TECHNIQUE:  CT scanning was performed from the dome of the diaphragm to the pubic symphysis with non-ionic intravenous contrast material. Post contrast coronal MPR imaging was performed.  Dose reduction techniques were used. Dose information is  transmitted to the ACR (American College of Radiology) NRDR (National Radiology Data Registry) which includes the Dose Index Registry.     PATIENT STATED HISTORY:(As transcribed by Technologist)   LLQ abdominal pain since Monday.  Pt has hx of diverticulitis, given abx Tuesday but pain persists      CONTRAST USED:  80cc of Isovue 370     FINDINGS:    LIVER:  Nodular contour of the liver is noted.  BILIARY:  Sequelae of cholecystectomy.  PANCREAS:  No lesion, fluid collection, ductal dilatation, or atrophy.    SPLEEN:  No enlargement or focal lesion.    KIDNEYS:  No mass, obstruction, or calcification.    ADRENALS:  No mass or enlargement.    AORTA/VASCULAR:  No aneurysm or dissection.    RETROPERITONEUM:  No mass or adenopathy.    BOWEL/MESENTERY:  The appendix is normal.  ABDOMINAL WALL:  No mass or hernia.    URINARY BLADDER:  No visible focal wall thickening, lesion, or calculus.    PELVIC NODES:  No adenopathy.    PELVIC ORGANS:  Left adnexal 5.6 x 4.5 x 4.0 cm heterogeneous enhancing mass extends to  multiple diverticula in the sigmoid colon.  BONES:  No bony lesion or fracture.    LUNG BASES:  No visible pulmonary or pleural disease.    OTHER:  Negative.                     Impression   CONCLUSION:       1. Interval cirrhotic configuration of the liver.     2. Left adnexal heterogeneous enhancing mass is continuous with diverticula of the sigmoid colon.  This may be sequelae of acute diverticulitis.  There is a similar diverticulitis that extend to the to the left adnexa in 2019. However, this can also be  related to left adnexal lesion as inflammatory changes extending to the sigmoid colon.  Recommend a follow-up pelvic ultrasound exam.        Study Result    Narrative   PROCEDURE:  US PELVIS (TRANSABDOMINAL AND TRANSVAGINAL) (CPT=76856/87530)     COMPARISON:  [facility], CT, CT ABDOMEN+PELVIS(CONTRAST ONLY)(CPT=74177), 6/14/2025, 9:42 PM.  [facility], US, US PELVIS EV W DOPPLER (TMH=95173/79133), 7/30/2013, 8:31 AM.     INDICATIONS:  is on abx for diverticulitis but pain continues     TECHNIQUE:  Pelvic ultrasound using transabdominal and endovaginal technique.  Transvaginal ultrasound was used to better evaluate adnexal and endometrial detail.     PATIENT STATED HISTORY: (As transcribed by Technologist)  Patient stated left lower abdominal pain for six days.         FINDINGS:                UTERUS:  6.03 cm x 2.05 cm x 3.94 cm    Endometrium Thickness: 0.31 cm, 0.37 cm, 0.38 cm    The uterus appears normal in size, shape, and echogenicity.  RIGHT OVARY:  Not visualized due to adjacent bowel gas.  The  LEFT OVARY:  Left ovary not visualized.  Left adnexal structure that is heterogeneous/hypoechoic measures 4.6 x 3.6 x 2.1 cm.  There is some vascularity in this region.    CUL-DE-SAC:  Normal.  No fluid or mass.    OTHER:  Negative.                        Impression   CONCLUSION:       The ovaries are not visualized despite exhaustive search.  Left adnexal heterogeneous mass is similar to recent CT examination.   This may be due to infectious process related to diverticulitis.  This is continuous the diverticula on prior CT exam.    Possibility of this representing a periovarian process still cannot be excluded.        Study Result    Narrative   PROCEDURE:  MRI ABDOMEN+PELVIS (ALL W+WO) (CPT=74183/88898)     COMPARISON:  [facility], CT, CT ABDOMEN+PELVIS(CONTRAST ONLY)(CPT=74177), 6/14/2025, 9:42 PM.     INDICATIONS:  Left adnexal mass     TECHNIQUE:  A comprehensive examination was performed utilizing a variety of imaging planes and imaging parameters to optimize visualization of suspected pathology.  Images were obtained both before and after intravenous gadolinium infusion.       PATIENT STATED HISTORY:(As transcribed by Technologist)  Left adnexal mass      CONTRAST USED:  20 mL of Dotarem     FINDINGS:    LIVER:  Surface nodularity of the liver with relative atrophy of the caudate and lateral segment left lobe.  Areas of confluent hepatic fibrosis noted throughout the liver with reactive parenchymal edema of the lateral segment left lobe.  BILIARY:  Mild dilation of intrahepatic ducts of the lateral segment left lobe.  Cholecystectomy with expected prominence of the common bile duct.  PANCREAS:  No lesion, fluid collection, ductal dilatation, or atrophy.    SPLEEN:  Splenomegaly with craniocaudal length of 14 cm.  KIDNEYS:  No mass or obstruction.    ADRENALS:  No mass or enlargement.    AORTA/VASCULAR:  No aneurysm or dissection.  Upper abdominal varices noted.    RETROPERITONEUM:  No mass or adenopathy.    BOWEL/MESENTERY:  No visible mass, obstruction, or bowel wall thickening.    ABDOMINAL WALL:  No mass or hernia.    PELVIC NODES:  No pelvic lymphadenopathy.  PELVIC ORGANS:  Anteverted uterus.  Normal thickness and signal of the endometrium and junctional zone.  Normal right ovary.  Complex process of the left adnexa measures 3.8 x 3.8 x 4.8 cm.  It demonstrates mixed T2 signal with avid,  heterogeneous  enhancement including central areas of cystic degeneration/necrosis.  This process abuts diverticula of the sigmoid colon, additionally has broad contact with the distal left ovarian vein.  This could represent a mass or abscess associated left ovary.    Diverticular abscess remains a possibility, as there is stranding and hyperemia about the adjacent sigmoid colon.  BONES:  No bony lesion or fracture.    LUNG BASES:  No visible pleural disease.  Lung bases not well assessed with MRI.    OTHER:  Small volume of free fluid within the abdomen and pelvis.                   Impression   CONCLUSION:       1. Cirrhotic liver morphology with confluent hepatic fibrosis and relative atrophy of the caudate and lateral segment left lobe.  This is not the typical pattern of cirrhosis as seen with chronic hepatocellular disease and may be a manifestation of  primary biliary cholangitis. Clinical and biochemical correlation advised.     2. Splenomegaly, additionally with small upper abdominal varices consistent with portal hypertension.     3. Complex process of the left adnexa (3.8 x 3.8 x 4.8 cm) demonstrates heterogeneous enhancement with central areas of cystic degeneration/necrosis.  This demonstrates broad contact with the left ovarian vein as well as the mid sigmoid colon with  stranding/inflammation about the mid sigmoid.  Differential includes neoplasm or abscess of the left ovary versus diverticular abscess of the mid sigmoid colon.        Assessment / Plan:    ICD-10-CM    1. Acute diverticulitis  K57.92 HYDROcodone-acetaminophen 5-325 MG Oral Tab        Patient with a history of Cha a cirrhosis, well compensated who presented with heterogeneous enhancing mass of the left adnexa continuously diverticula of the sigmoid colon, likely sequelae of diverticulitis  Reviewed imaging, ideally should get updated preoperative endoscopy  Discussed with dr Ruiz, will evaluate and optimize  preoperatively  Recommend robotic assisted low anterior resection, given instructions for bowel prep  Dr. Heath to perform hysterectomy/oophorectomy  Counseled about risks of surgery including not limited to bleeding, infection, anastomotic complications  Patient agreeable and wished to proceed    Bernardino Cano MD  [facility]  System Medical Director of Surgical Services  [facility]        Follow Up:  No follow-ups on file.       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   [2]   Current Outpatient Medications:     HYDROcodone-acetaminophen 5-325 MG Oral Tab, Take 1-2 tablets by mouth every 4 (four) hours as needed for Pain., Disp: 20 tablet, Rfl: 0    HYDROcodone-acetaminophen 5-325 MG Oral Tab, Take 1 tablet by mouth every 8 (eight) hours as needed for Pain., Disp: 20 tablet, Rfl: 0    traZODone 100 MG Oral Tab, TAKE 1.5 TABLETS BY MOUTH NIGHTLY., Disp: 45 tablet, Rfl: 1    hydrOXYzine 10 MG Oral Tab, Take 1-2 tablets (10-20 mg total) by mouth every 8 (eight) hours as needed for Anxiety., Disp: 180 tablet, Rfl: 0    OMEPRAZOLE 20 MG Oral Capsule Delayed Release, TAKE ONE CAPSULE (20 MG TOTAL) BY MOUTH ONCE DAILY, 30 MINUTES PRIOR TO BREAKFAST., Disp: 90 capsule, Rfl: 0    Probiotic Product (PROBIOTIC PEARLS) Oral Cap, Take 1 capsule by  mouth in the morning., Disp: , Rfl:   [3] No Known Allergies  [4]   Past Medical History:   Acute sinusitis, unspecified    Anxiety    Anxiety state, unspecified    Bloating    Depression    Diverticulosis of large intestine    Esophageal reflux    Esophagitis    LA Grade B esophagitis    Essential hypertension    Flatulence/gas pain/belching    Gastritis    Heartburn    Hemorrhoids    Hiatal hernia    History of mental disorder    Depression and anxiety    Infective otitis externa, unspecified    Internal hemorrhoids    Nonspecific colitis    mild, non-specific colitis, possibly prep induced    Other disorder of menstruation and other abnormal bleeding from female genital tract    Sleep disturbance    Wears glasses   [5]   Past Surgical History:  Procedure Laterality Date    Cholecystectomy      Colonoscopy      Colonoscopy      Colonoscopy & polypectomy  11/14/2013    Dr. Gil; Due 11/14/2018    Other  24 years ago    trauma to right leg    Upper gi endoscopy,biopsy  11/14/2013    Dr. Gil; Due 5/14/2014   [6]   Social History  Socioeconomic History    Marital status:    Tobacco Use    Smoking status: Every Day     Current packs/day: 0.50     Average packs/day: 0.5 packs/day for 29.0 years (14.5 ttl pk-yrs)     Types: Cigarettes    Smokeless tobacco: Never   Vaping Use    Vaping status: Never Used   Substance and Sexual Activity    Alcohol use: Not Currently     Comment: None currently     Drug use: Never   Other Topics Concern    Caffeine Concern No    Stress Concern No    Weight Concern No    Special Diet No    Exercise No    Seat Belt Yes   [7]   Family History  Problem Relation Age of Onset    Diabetes Father     Hypertension Father     Stroke Father         Mild stroke in Dec 2021    Hypertension Mother     Gastro-Intestinal Disorder Mother         Crohns    Crohn's Disease Mother     Cancer Maternal Grandmother         ovarian cancer    Gastro-Intestinal Disorder Sister          diverticulitis    Cancer Sister         cervical cancer    Colon Cancer Maternal Grandfather

## 2025-07-11 NOTE — CONSULTS
[facility] Surgical Oncology and Breast Surgery      Patient Name:  June Goncalves   YOB: 1965   Gender:  Female   Appt Date:  7/8/2025   Provider:  Bernardino Cano MD   Insurance:  [employer] OPEN ACCESS      PATIENT PROVIDERS  Referring Provider: No ref. provider found   Address: No referring provider defined for this encounter.   Phone #: N/A    Primary Care Provider:Karrie Orta DO   Address: [address] [place] [address]   Phone #: [phone]       CHIEF COMPLAINT  Chief Complaint   Patient presents with    New Patient        PROBLEMS  Reviewed Problem List[1]     History of Present Illness:  June Goncalves is a 59 year old female with PMHx hypertension, anxiety, diverticulitis, cirrhotic liver, portal hypertension, alcohol use disorder, laparoscopic cholecystectomy, who is referred by Dr. Johnson to render an opinion regarding the surgical management of acute diverticulitis.     Patient presented to [place] emergency department on 6/14 with lower abdominal pain for one week. Denied fever, chills, nausea, vomiting, diarrhea, melena at that time. Patient was seen in immediate care 6 days prior and started on antibiotics for diverticulitis without improvement. CT  abdomen pelvis was obtained and shows what appears to be diverticulitis along with the heterogenous mass near the left adnexa, concerning for possible abscess. Liver enzymes elevated and bilirubin elevated at 3.1 at that time. Pelvic ultrasound on 6/14 showed left adnexal structure that is heterogeneous/hypoechoic measures 4.6 x 3.6 x 2.1 cm. MRI abdomen pelvis on 6/15 shows complex process of the left adnexa (3.8 x 3.8 x 4.8 cm) demonstrating heterogeneous enhancement with central areas of cystic degeneration/necrosis.  Has broad contact with the left ovarian vein as well as the mid sigmoid colon with stranding/inflammation about the mid sigmoid. CEA 1/16 was 3.2,  elevated at 114, AFP 5.6. Patient was discharged  in stable condition on PO antibiotics levofloxacin and metronidazole completed on June 25th.          Vital Signs:  /90 (BP Location: Left arm, Patient Position: Sitting)   Pulse 109   Resp 16   Wt 70.8 kg (156 lb)   SpO2 98%   BMI 26.78 kg/m²      Medications Reviewed:  Medications - Current[2]     Allergies Reviewed:  Allergies[3]     History:  Reviewed:  Past Medical History[4]   Reviewed:  Past Surgical History[5]   Reviewed Social History:  Social Hx on file[6]   Reviewed:  Family History[7]     Review of Systems:  Review of Systems   Constitutional:  Negative for activity change, appetite change, chills, fatigue, fever and unexpected weight change.   HENT:  Negative for congestion and trouble swallowing.    Eyes:  Negative for discharge and redness.   Respiratory:  Negative for cough, chest tightness and shortness of breath.    Cardiovascular:  Negative for chest pain and leg swelling.   Gastrointestinal:  Negative for abdominal distention, abdominal pain, nausea and vomiting.   Endocrine: Negative for polydipsia and polyuria.   Genitourinary:  Negative for difficulty urinating and dysuria.   Musculoskeletal:  Negative for myalgias.   Skin:  Negative for color change and pallor.   Allergic/Immunologic: Negative for immunocompromised state.   Neurological:  Negative for syncope and weakness.   Hematological:  Does not bruise/bleed easily.   Psychiatric/Behavioral:  Negative for agitation and confusion.         Physical Examination:  Physical Exam  Constitutional:       Appearance: She is well-developed.   HENT:      Head: Normocephalic and atraumatic.   Eyes:      General: No scleral icterus.     Pupils: Pupils are equal, round, and reactive to light.   Cardiovascular:      Rate and Rhythm: Normal rate and regular rhythm.      Heart sounds: No murmur heard.  Pulmonary:      Effort: Pulmonary effort is normal. No respiratory distress.   Abdominal:      General: There is no distension.      Palpations:  Abdomen is soft.      Tenderness: There is no abdominal tenderness.   Musculoskeletal:         General: Normal range of motion.      Cervical back: Normal range of motion.   Skin:     General: Skin is warm and dry.   Neurological:      Mental Status: She is alert and oriented to person, place, and time.   Psychiatric:         Mood and Affect: Mood normal.         Behavior: Behavior normal. Behavior is cooperative.        Study Result    Narrative   PROCEDURE:  CT ABDOMEN+PELVIS (CONTRAST ONLY) (CPT=74177)     COMPARISON:  [facility], CT, CT ABDOMEN+PELVIS(CONTRAST ONLY)(CPT=74177), 1/16/2019, 4:18 PM.     INDICATIONS:  is on abx for diverticulitis but pain continues     TECHNIQUE:  CT scanning was performed from the dome of the diaphragm to the pubic symphysis with non-ionic intravenous contrast material. Post contrast coronal MPR imaging was performed.  Dose reduction techniques were used. Dose information is  transmitted to the ACR (American College of Radiology) NRDR (National Radiology Data Registry) which includes the Dose Index Registry.     PATIENT STATED HISTORY:(As transcribed by Technologist)   LLQ abdominal pain since Monday.  Pt has hx of diverticulitis, given abx Tuesday but pain persists      CONTRAST USED:  80cc of Isovue 370     FINDINGS:    LIVER:  Nodular contour of the liver is noted.  BILIARY:  Sequelae of cholecystectomy.  PANCREAS:  No lesion, fluid collection, ductal dilatation, or atrophy.    SPLEEN:  No enlargement or focal lesion.    KIDNEYS:  No mass, obstruction, or calcification.    ADRENALS:  No mass or enlargement.    AORTA/VASCULAR:  No aneurysm or dissection.    RETROPERITONEUM:  No mass or adenopathy.    BOWEL/MESENTERY:  The appendix is normal.  ABDOMINAL WALL:  No mass or hernia.    URINARY BLADDER:  No visible focal wall thickening, lesion, or calculus.    PELVIC NODES:  No adenopathy.    PELVIC ORGANS:  Left adnexal 5.6 x 4.5 x 4.0 cm heterogeneous enhancing mass extends to  multiple diverticula in the sigmoid colon.  BONES:  No bony lesion or fracture.    LUNG BASES:  No visible pulmonary or pleural disease.    OTHER:  Negative.                     Impression   CONCLUSION:       1. Interval cirrhotic configuration of the liver.     2. Left adnexal heterogeneous enhancing mass is continuous with diverticula of the sigmoid colon.  This may be sequelae of acute diverticulitis.  There is a similar diverticulitis that extend to the to the left adnexa in 2019. However, this can also be  related to left adnexal lesion as inflammatory changes extending to the sigmoid colon.  Recommend a follow-up pelvic ultrasound exam.        Study Result    Narrative   PROCEDURE:  US PELVIS (TRANSABDOMINAL AND TRANSVAGINAL) (CPT=76856/35174)     COMPARISON:  [facility], CT, CT ABDOMEN+PELVIS(CONTRAST ONLY)(CPT=74177), 6/14/2025, 9:42 PM.  [facility], US, US PELVIS EV W DOPPLER (JXE=85279/65675), 7/30/2013, 8:31 AM.     INDICATIONS:  is on abx for diverticulitis but pain continues     TECHNIQUE:  Pelvic ultrasound using transabdominal and endovaginal technique.  Transvaginal ultrasound was used to better evaluate adnexal and endometrial detail.     PATIENT STATED HISTORY: (As transcribed by Technologist)  Patient stated left lower abdominal pain for six days.         FINDINGS:                UTERUS:  6.03 cm x 2.05 cm x 3.94 cm    Endometrium Thickness: 0.31 cm, 0.37 cm, 0.38 cm    The uterus appears normal in size, shape, and echogenicity.  RIGHT OVARY:  Not visualized due to adjacent bowel gas.  The  LEFT OVARY:  Left ovary not visualized.  Left adnexal structure that is heterogeneous/hypoechoic measures 4.6 x 3.6 x 2.1 cm.  There is some vascularity in this region.    CUL-DE-SAC:  Normal.  No fluid or mass.    OTHER:  Negative.                        Impression   CONCLUSION:       The ovaries are not visualized despite exhaustive search.  Left adnexal heterogeneous mass is similar to recent CT examination.   This may be due to infectious process related to diverticulitis.  This is continuous the diverticula on prior CT exam.    Possibility of this representing a periovarian process still cannot be excluded.        Study Result    Narrative   PROCEDURE:  MRI ABDOMEN+PELVIS (ALL W+WO) (CPT=74183/29291)     COMPARISON:  [facility], CT, CT ABDOMEN+PELVIS(CONTRAST ONLY)(CPT=74177), 6/14/2025, 9:42 PM.     INDICATIONS:  Left adnexal mass     TECHNIQUE:  A comprehensive examination was performed utilizing a variety of imaging planes and imaging parameters to optimize visualization of suspected pathology.  Images were obtained both before and after intravenous gadolinium infusion.       PATIENT STATED HISTORY:(As transcribed by Technologist)  Left adnexal mass      CONTRAST USED:  20 mL of Dotarem     FINDINGS:    LIVER:  Surface nodularity of the liver with relative atrophy of the caudate and lateral segment left lobe.  Areas of confluent hepatic fibrosis noted throughout the liver with reactive parenchymal edema of the lateral segment left lobe.  BILIARY:  Mild dilation of intrahepatic ducts of the lateral segment left lobe.  Cholecystectomy with expected prominence of the common bile duct.  PANCREAS:  No lesion, fluid collection, ductal dilatation, or atrophy.    SPLEEN:  Splenomegaly with craniocaudal length of 14 cm.  KIDNEYS:  No mass or obstruction.    ADRENALS:  No mass or enlargement.    AORTA/VASCULAR:  No aneurysm or dissection.  Upper abdominal varices noted.    RETROPERITONEUM:  No mass or adenopathy.    BOWEL/MESENTERY:  No visible mass, obstruction, or bowel wall thickening.    ABDOMINAL WALL:  No mass or hernia.    PELVIC NODES:  No pelvic lymphadenopathy.  PELVIC ORGANS:  Anteverted uterus.  Normal thickness and signal of the endometrium and junctional zone.  Normal right ovary.  Complex process of the left adnexa measures 3.8 x 3.8 x 4.8 cm.  It demonstrates mixed T2 signal with avid,  heterogeneous  enhancement including central areas of cystic degeneration/necrosis.  This process abuts diverticula of the sigmoid colon, additionally has broad contact with the distal left ovarian vein.  This could represent a mass or abscess associated left ovary.    Diverticular abscess remains a possibility, as there is stranding and hyperemia about the adjacent sigmoid colon.  BONES:  No bony lesion or fracture.    LUNG BASES:  No visible pleural disease.  Lung bases not well assessed with MRI.    OTHER:  Small volume of free fluid within the abdomen and pelvis.                   Impression   CONCLUSION:       1. Cirrhotic liver morphology with confluent hepatic fibrosis and relative atrophy of the caudate and lateral segment left lobe.  This is not the typical pattern of cirrhosis as seen with chronic hepatocellular disease and may be a manifestation of  primary biliary cholangitis. Clinical and biochemical correlation advised.     2. Splenomegaly, additionally with small upper abdominal varices consistent with portal hypertension.     3. Complex process of the left adnexa (3.8 x 3.8 x 4.8 cm) demonstrates heterogeneous enhancement with central areas of cystic degeneration/necrosis.  This demonstrates broad contact with the left ovarian vein as well as the mid sigmoid colon with  stranding/inflammation about the mid sigmoid.  Differential includes neoplasm or abscess of the left ovary versus diverticular abscess of the mid sigmoid colon.        Assessment / Plan:    ICD-10-CM    1. Acute diverticulitis  K57.92 HYDROcodone-acetaminophen 5-325 MG Oral Tab        Patient with a history of Cha a cirrhosis, well compensated who presented with heterogeneous enhancing mass of the left adnexa continuously diverticula of the sigmoid colon, likely sequelae of diverticulitis  Reviewed imaging, ideally should get updated preoperative endoscopy  Discussed with dr Ruiz, will evaluate and optimize  preoperatively  Recommend robotic assisted low anterior resection, given instructions for bowel prep  Dr. Heath to perform hysterectomy/oophorectomy  Counseled about risks of surgery including not limited to bleeding, infection, anastomotic complications  Patient agreeable and wished to proceed    Bernardino Cano MD  [facility]  System Medical Director of Surgical Services  [facility]        Follow Up:  No follow-ups on file.       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   [2]   Current Outpatient Medications:     HYDROcodone-acetaminophen 5-325 MG Oral Tab, Take 1-2 tablets by mouth every 4 (four) hours as needed for Pain., Disp: 20 tablet, Rfl: 0    HYDROcodone-acetaminophen 5-325 MG Oral Tab, Take 1 tablet by mouth every 8 (eight) hours as needed for Pain., Disp: 20 tablet, Rfl: 0    traZODone 100 MG Oral Tab, TAKE 1.5 TABLETS BY MOUTH NIGHTLY., Disp: 45 tablet, Rfl: 1    hydrOXYzine 10 MG Oral Tab, Take 1-2 tablets (10-20 mg total) by mouth every 8 (eight) hours as needed for Anxiety., Disp: 180 tablet, Rfl: 0    OMEPRAZOLE 20 MG Oral Capsule Delayed Release, TAKE ONE CAPSULE (20 MG TOTAL) BY MOUTH ONCE DAILY, 30 MINUTES PRIOR TO BREAKFAST., Disp: 90 capsule, Rfl: 0    Probiotic Product (PROBIOTIC PEARLS) Oral Cap, Take 1 capsule  by mouth in the morning., Disp: , Rfl:   [3] No Known Allergies  [4]   Past Medical History:   Acute sinusitis, unspecified    Anxiety    Anxiety state, unspecified    Bloating    Depression    Diverticulosis of large intestine    Esophageal reflux    Esophagitis    LA Grade B esophagitis    Essential hypertension    Flatulence/gas pain/belching    Gastritis    Heartburn    Hemorrhoids    Hiatal hernia    History of mental disorder    Depression and anxiety    Infective otitis externa, unspecified    Internal hemorrhoids    Nonspecific colitis    mild, non-specific colitis, possibly prep induced    Other disorder of menstruation and other abnormal bleeding from female genital tract    Sleep disturbance    Wears glasses   [5]   Past Surgical History:  Procedure Laterality Date    Cholecystectomy      Colonoscopy      Colonoscopy      Colonoscopy & polypectomy  11/14/2013    Dr. Gil; Due 11/14/2018    Other  24 years ago    trauma to right leg    Upper gi endoscopy,biopsy  11/14/2013    Dr. Gil; Due 5/14/2014   [6]   Social History  Socioeconomic History    Marital status:    Tobacco Use    Smoking status: Every Day     Current packs/day: 0.50     Average packs/day: 0.5 packs/day for 29.0 years (14.5 ttl pk-yrs)     Types: Cigarettes    Smokeless tobacco: Never   Vaping Use    Vaping status: Never Used   Substance and Sexual Activity    Alcohol use: Not Currently     Comment: None currently     Drug use: Never   Other Topics Concern    Caffeine Concern No    Stress Concern No    Weight Concern No    Special Diet No    Exercise No    Seat Belt Yes   [7]   Family History  Problem Relation Age of Onset    Diabetes Father     Hypertension Father     Stroke Father         Mild stroke in Dec 2021    Hypertension Mother     Gastro-Intestinal Disorder Mother         Crohns    Crohn's Disease Mother     Cancer Maternal Grandmother         ovarian cancer    Gastro-Intestinal Disorder Sister          diverticulitis    Cancer Sister         cervical cancer    Colon Cancer Maternal Grandfather

## 2025-07-12 RX ORDER — NYSTATIN 100000 [USP'U]/ML
5 SUSPENSION ORAL 4 TIMES DAILY
Qty: 280 ML | Refills: 0 | Status: SHIPPED | OUTPATIENT
Start: 2025-07-12 | End: 2025-07-26

## 2025-07-12 NOTE — TELEPHONE ENCOUNTER
Allen Santizo, I forwarded this message to you, I have not seen this patient since 2015 and she does not have any future appointments with me.  Thank you.

## 2025-07-14 ENCOUNTER — DOCUMENTATION ONLY (OUTPATIENT)
Facility: CLINIC | Age: 60
End: 2025-07-14

## 2025-07-14 DIAGNOSIS — K57.92 ACUTE DIVERTICULITIS: ICD-10-CM

## 2025-07-14 RX ORDER — HYDROCODONE BITARTRATE AND ACETAMINOPHEN 5; 325 MG/1; MG/1
1-2 TABLET ORAL EVERY 4 HOURS PRN
Qty: 20 TABLET | Refills: 0 | Status: SHIPPED | OUTPATIENT
Start: 2025-07-14

## 2025-07-14 NOTE — PROGRESS NOTES
Date of Surgery: TBD    Diagnosis: Acute diverticulitis, K57.92     Procedure: Xi Robot-assisted, laparoscopic, possible open low anterior resection, joint Dr. Johnson     Location: [x] [facility]  [] [facility]  [] [facility]    Type: [x] Inpatient  [] Outpatient  [] 23-hour observation    Number of days inpatient: 3 days    Length of Surgery: 3 hours    Anesthesia: [] Local  [] MAC [x] General  [] Pec Block     Joint case with: [] Yes, Dr. Johnson [] No   Needs SA:  [x] Yes  [] No    Position/Special Equipment:    Penicillin Allergy: [] Yes [x] No   Nickel Allergy: [] Yes [x] No   Latex Allergy: [] Yes [x] No    Orders:  Colon Bundle/Bowel Prep: [x] Yes  [] No    Type and Cross 2 units PRBC: [x] Yes [] No    Type and Screen: [x] Yes [] No    Advanced Oncology Order Set (HIPEC): [] Yes [x] No    Heparin Pre-op (Heparin 5000 units subQ x 1 dose): [x] Yes  [] No    Nuclear Med Injection: [] Yes  [] No    Wire localization needed: [] Yes [x] No    Midline placement (HIPEC,Whipple, Esophagectomy, Liver): [] Yes [x] No    CHG Cloths (HIPEC, Whipple, Esophagectomy, Liver): [x] Yes [] No    Tylenol administration prior to surgery: [x] Yes [] No    Does patient have a pacemaker: [] Yes [x] No  Sleep Apnea: [] Yes  [x] No      Is patient diabetic: [] Yes, if so, no Ensure/yes to Sugar free Gatorade [x] No    Antibiotics: [x] /[facility] prophylactic antibiotic protocol [] No need of antibiotics    PAT Orders: [x]CBC  [x]CMP [x]EKG  []CT Scan []Chest X-ray

## 2025-07-15 ENCOUNTER — MED REC SCAN ONLY (OUTPATIENT)
Dept: OBGYN CLINIC | Facility: CLINIC | Age: 60
End: 2025-07-15

## 2025-07-15 ENCOUNTER — TELEPHONE (OUTPATIENT)
Dept: FAMILY MEDICINE CLINIC | Facility: CLINIC | Age: 60
End: 2025-07-15

## 2025-07-15 ENCOUNTER — EKG ENCOUNTER (OUTPATIENT)
Dept: LAB | Age: 60
End: 2025-07-15
Attending: OBSTETRICS & GYNECOLOGY
Payer: COMMERCIAL

## 2025-07-15 ENCOUNTER — HOSPITAL ENCOUNTER (OUTPATIENT)
Dept: GENERAL RADIOLOGY | Age: 60
Discharge: HOME | End: 2025-07-15
Attending: OBSTETRICS & GYNECOLOGY
Payer: COMMERCIAL

## 2025-07-15 DIAGNOSIS — R19.00 ABDOMINAL LUMP: ICD-10-CM

## 2025-07-15 DIAGNOSIS — R19.00 ABDOMINAL MASS: ICD-10-CM

## 2025-07-15 DIAGNOSIS — Z01.818 PRE-OP TESTING: Primary | ICD-10-CM

## 2025-07-15 LAB
ATRIAL RATE: 110 BPM
P AXIS: 46 DEGREES
P-R INTERVAL: 130 MS
Q-T INTERVAL: 296 MS
QRS DURATION: 68 MS
QTC CALCULATION (BEZET): 400 MS
R AXIS: 32 DEGREES
T AXIS: 84 DEGREES
VENTRICULAR RATE: 110 BPM

## 2025-07-15 PROCEDURE — 93010 ELECTROCARDIOGRAM REPORT: CPT | Performed by: INTERNAL MEDICINE

## 2025-07-15 PROCEDURE — 93005 ELECTROCARDIOGRAM TRACING: CPT

## 2025-07-15 PROCEDURE — 71046 X-RAY EXAM CHEST 2 VIEWS: CPT | Performed by: OBSTETRICS & GYNECOLOGY

## 2025-07-15 NOTE — TELEPHONE ENCOUNTER
Pre Surgical Appointment      DOS:8/8/25  Location: [facility]   Surgeon: Usman Johnson  Procedure: Abdominal Hysterectomy   Pre-op Appointment Date: 7/28/25 @ 10am     Orders received, check list started and placed in MA's basket.

## 2025-07-18 DIAGNOSIS — K57.92 ACUTE DIVERTICULITIS: ICD-10-CM

## 2025-07-18 RX ORDER — HYDROCODONE BITARTRATE AND ACETAMINOPHEN 5; 325 MG/1; MG/1
1-2 TABLET ORAL EVERY 4 HOURS PRN
Qty: 20 TABLET | Refills: 0 | Status: SHIPPED | OUTPATIENT
Start: 2025-07-18

## 2025-07-21 DIAGNOSIS — K57.92 ACUTE DIVERTICULITIS: Primary | ICD-10-CM

## 2025-07-23 DIAGNOSIS — K57.92 ACUTE DIVERTICULITIS: ICD-10-CM

## 2025-07-23 RX ORDER — HYDROCODONE BITARTRATE AND ACETAMINOPHEN 5; 325 MG/1; MG/1
1-2 TABLET ORAL EVERY 4 HOURS PRN
Qty: 20 TABLET | Refills: 0 | Status: SHIPPED | OUTPATIENT
Start: 2025-07-23 | End: 2025-07-26

## 2025-07-26 DIAGNOSIS — K57.92 ACUTE DIVERTICULITIS: ICD-10-CM

## 2025-07-28 ENCOUNTER — OFFICE VISIT (OUTPATIENT)
Dept: FAMILY MEDICINE CLINIC | Facility: CLINIC | Age: 60
End: 2025-07-28
Payer: COMMERCIAL

## 2025-07-28 VITALS
SYSTOLIC BLOOD PRESSURE: 106 MMHG | WEIGHT: 149.81 LBS | HEART RATE: 97 BPM | RESPIRATION RATE: 16 BRPM | BODY MASS INDEX: 25.26 KG/M2 | OXYGEN SATURATION: 98 % | HEIGHT: 64.5 IN | DIASTOLIC BLOOD PRESSURE: 76 MMHG | TEMPERATURE: 97 F

## 2025-07-28 DIAGNOSIS — K70.31 ALCOHOLIC CIRRHOSIS OF LIVER WITH ASCITES (HCC): ICD-10-CM

## 2025-07-28 DIAGNOSIS — N83.8 OVARIAN MASS, LEFT: ICD-10-CM

## 2025-07-28 DIAGNOSIS — F41.1 GENERALIZED ANXIETY DISORDER: ICD-10-CM

## 2025-07-28 DIAGNOSIS — F17.200 CURRENT EVERY DAY SMOKER: ICD-10-CM

## 2025-07-28 DIAGNOSIS — R94.31 ABNORMAL EKG: ICD-10-CM

## 2025-07-28 DIAGNOSIS — K21.9 GASTROESOPHAGEAL REFLUX DISEASE WITHOUT ESOPHAGITIS: ICD-10-CM

## 2025-07-28 DIAGNOSIS — F10.20 ALCOHOLIC (HCC): ICD-10-CM

## 2025-07-28 DIAGNOSIS — R71.8 ELEVATED MCV: ICD-10-CM

## 2025-07-28 DIAGNOSIS — B37.0 THRUSH: ICD-10-CM

## 2025-07-28 DIAGNOSIS — F51.05 INSOMNIA SECONDARY TO ANXIETY: ICD-10-CM

## 2025-07-28 DIAGNOSIS — Z12.31 VISIT FOR SCREENING MAMMOGRAM: ICD-10-CM

## 2025-07-28 DIAGNOSIS — K44.9 HIATAL HERNIA: ICD-10-CM

## 2025-07-28 DIAGNOSIS — R79.89 LOW SERUM CHLORIDE: ICD-10-CM

## 2025-07-28 DIAGNOSIS — K76.6: ICD-10-CM

## 2025-07-28 DIAGNOSIS — F41.9 INSOMNIA SECONDARY TO ANXIETY: ICD-10-CM

## 2025-07-28 DIAGNOSIS — Z71.6 ENCOUNTER FOR TOBACCO USE CESSATION COUNSELING: ICD-10-CM

## 2025-07-28 DIAGNOSIS — R34 DECREASED URINATION: ICD-10-CM

## 2025-07-28 DIAGNOSIS — R74.01 TRANSAMINITIS: ICD-10-CM

## 2025-07-28 DIAGNOSIS — Z01.818 PREOP EXAMINATION: Primary | ICD-10-CM

## 2025-07-28 DIAGNOSIS — E78.2 MIXED HYPERLIPIDEMIA: ICD-10-CM

## 2025-07-28 DIAGNOSIS — M70.21 OLECRANON BURSITIS OF RIGHT ELBOW: ICD-10-CM

## 2025-07-28 PROBLEM — K57.90 DIVERTICULOSIS OF INTESTINE, PART UNSPECIFIED, WITHOUT PERFORATION OR ABSCESS WITHOUT BLEEDING: Status: ACTIVE | Noted: 2025-07-28

## 2025-07-28 PROBLEM — R19.00 INTRA-ABDOMINAL AND PELVIC SWELLING, MASS AND LUMP, UNSPECIFIED SITE: Status: ACTIVE | Noted: 2025-07-28

## 2025-07-28 PROBLEM — K74.60 HEPATIC CIRRHOSIS (HCC): Status: ACTIVE | Noted: 2025-07-28

## 2025-07-28 LAB
ATRIAL RATE: 97 BPM
BILIRUB UR QL CFM: NEGATIVE
GLUCOSE UR STRIP.AUTO-MCNC: NEGATIVE MG/DL
HYALINE CASTS #/AREA URNS AUTO: PRESENT /LPF
KETONES UR STRIP.AUTO-MCNC: 40 MG/DL
LEUKOCYTE ESTERASE UR QL STRIP.AUTO: NEGATIVE
NITRITE UR QL STRIP.AUTO: POSITIVE
P AXIS: 70 DEGREES
P-R INTERVAL: 132 MS
PH UR STRIP.AUTO: 5.5 (ref 5–8)
Q-T INTERVAL: 382 MS
QRS DURATION: 72 MS
QTC CALCULATION (BEZET): 485 MS
R AXIS: 79 DEGREES
RBC UR QL AUTO: NEGATIVE
SP GR UR STRIP.AUTO: >=1.03 (ref 1–1.03)
T AXIS: 61 DEGREES
UROBILINOGEN UR STRIP.AUTO-MCNC: 1 MG/DL
VENTRICULAR RATE: 97 BPM

## 2025-07-28 PROCEDURE — 81001 URINALYSIS AUTO W/SCOPE: CPT | Performed by: FAMILY MEDICINE

## 2025-07-28 PROCEDURE — 81015 MICROSCOPIC EXAM OF URINE: CPT | Performed by: FAMILY MEDICINE

## 2025-07-28 PROCEDURE — 87086 URINE CULTURE/COLONY COUNT: CPT | Performed by: FAMILY MEDICINE

## 2025-07-28 PROCEDURE — 99204 OFFICE O/P NEW MOD 45 MIN: CPT | Performed by: FAMILY MEDICINE

## 2025-07-28 PROCEDURE — 93000 ELECTROCARDIOGRAM COMPLETE: CPT | Performed by: FAMILY MEDICINE

## 2025-07-28 RX ORDER — HYDROCODONE BITARTRATE AND ACETAMINOPHEN 5; 325 MG/1; MG/1
1-2 TABLET ORAL EVERY 4 HOURS PRN
Qty: 20 TABLET | Refills: 0 | Status: SHIPPED | OUTPATIENT
Start: 2025-07-28

## 2025-07-29 PROBLEM — B37.0 THRUSH: Status: ACTIVE | Noted: 2025-07-29

## 2025-07-29 PROBLEM — N83.8 OVARIAN MASS, LEFT: Status: ACTIVE | Noted: 2025-07-29

## 2025-07-29 PROBLEM — K76.6: Status: ACTIVE | Noted: 2025-07-29

## 2025-07-31 ENCOUNTER — TELEPHONE (OUTPATIENT)
Dept: SURGERY | Facility: CLINIC | Age: 60
End: 2025-07-31

## 2025-07-31 DIAGNOSIS — K57.92 ACUTE DIVERTICULITIS: ICD-10-CM

## 2025-07-31 DIAGNOSIS — K57.92 ACUTE DIVERTICULITIS: Primary | ICD-10-CM

## 2025-07-31 RX ORDER — HYDROCODONE BITARTRATE AND ACETAMINOPHEN 5; 325 MG/1; MG/1
1-2 TABLET ORAL EVERY 6 HOURS PRN
Qty: 40 TABLET | Refills: 0 | Status: SHIPPED | OUTPATIENT
Start: 2025-07-31

## 2025-07-31 RX ORDER — HYDROCODONE BITARTRATE AND ACETAMINOPHEN 5; 325 MG/1; MG/1
1-2 TABLET ORAL EVERY 4 HOURS PRN
Qty: 20 TABLET | Refills: 0 | OUTPATIENT
Start: 2025-07-31

## 2025-08-04 ENCOUNTER — TELEPHONE (OUTPATIENT)
Dept: FAMILY MEDICINE CLINIC | Facility: CLINIC | Age: 60
End: 2025-08-04

## 2025-08-04 ENCOUNTER — PATIENT MESSAGE (OUTPATIENT)
Dept: FAMILY MEDICINE CLINIC | Facility: CLINIC | Age: 60
End: 2025-08-04

## 2025-08-05 ENCOUNTER — HOSPITAL ENCOUNTER (EMERGENCY)
Age: 60
Discharge: HOME | End: 2025-08-05
Attending: EMERGENCY MEDICINE

## 2025-08-05 ENCOUNTER — LAB ENCOUNTER (OUTPATIENT)
Dept: LAB | Age: 60
End: 2025-08-05
Attending: OBSTETRICS & GYNECOLOGY

## 2025-08-05 VITALS
SYSTOLIC BLOOD PRESSURE: 92 MMHG | RESPIRATION RATE: 18 BRPM | HEART RATE: 89 BPM | HEIGHT: 64 IN | DIASTOLIC BLOOD PRESSURE: 67 MMHG | OXYGEN SATURATION: 97 % | BODY MASS INDEX: 24.92 KG/M2 | WEIGHT: 146 LBS | TEMPERATURE: 98 F

## 2025-08-05 DIAGNOSIS — Z01.818 PREOP TESTING: ICD-10-CM

## 2025-08-05 DIAGNOSIS — E87.6 HYPOKALEMIA: Primary | ICD-10-CM

## 2025-08-05 DIAGNOSIS — E83.42 HYPOMAGNESEMIA: ICD-10-CM

## 2025-08-05 LAB
ALBUMIN SERPL-MCNC: 3.4 G/DL (ref 3.2–4.8)
ALBUMIN SERPL-MCNC: 3.7 G/DL (ref 3.2–4.8)
ALBUMIN/GLOB SERPL: 1.2 (ref 1–2)
ALBUMIN/GLOB SERPL: 1.3 (ref 1–2)
ALP LIVER SERPL-CCNC: 142 U/L (ref 46–118)
ALP LIVER SERPL-CCNC: 149 U/L (ref 46–118)
ALT SERPL-CCNC: 10 U/L (ref 10–49)
ALT SERPL-CCNC: 9 U/L (ref 10–49)
ANION GAP SERPL CALC-SCNC: 11 MMOL/L (ref 0–18)
ANION GAP SERPL CALC-SCNC: 12 MMOL/L (ref 0–18)
AST SERPL-CCNC: 35 U/L (ref ?–34)
AST SERPL-CCNC: 37 U/L (ref ?–34)
BASOPHILS # BLD AUTO: 0.04 X10(3) UL (ref 0–0.2)
BASOPHILS NFR BLD AUTO: 0.3 %
BILIRUB SERPL-MCNC: 1.2 MG/DL (ref 0.3–1.2)
BILIRUB SERPL-MCNC: 1.4 MG/DL (ref 0.3–1.2)
BUN BLD-MCNC: 11 MG/DL (ref 9–23)
BUN BLD-MCNC: 13 MG/DL (ref 9–23)
CALCIUM BLD-MCNC: 8.5 MG/DL (ref 8.7–10.6)
CALCIUM BLD-MCNC: 9.2 MG/DL (ref 8.7–10.6)
CHLORIDE SERPL-SCNC: 89 MMOL/L (ref 98–112)
CHLORIDE SERPL-SCNC: 90 MMOL/L (ref 98–112)
CHOLEST SERPL-MCNC: 116 MG/DL (ref ?–200)
CO2 SERPL-SCNC: 30 MMOL/L (ref 21–32)
CO2 SERPL-SCNC: 32 MMOL/L (ref 21–32)
CREAT BLD-MCNC: 0.6 MG/DL (ref 0.55–1.02)
CREAT BLD-MCNC: 0.7 MG/DL (ref 0.55–1.02)
DEPRECATED HBV CORE AB SER IA-ACNC: 1232 NG/ML (ref 50–306)
EGFRCR SERPLBLD CKD-EPI 2021: 100 ML/MIN/1.73M2 (ref 60–?)
EGFRCR SERPLBLD CKD-EPI 2021: 103 ML/MIN/1.73M2 (ref 60–?)
EOSINOPHIL # BLD AUTO: 0.04 X10(3) UL (ref 0–0.7)
EOSINOPHIL NFR BLD AUTO: 0.3 %
ERYTHROCYTE [DISTWIDTH] IN BLOOD BY AUTOMATED COUNT: 14.2 %
EST. AVERAGE GLUCOSE BLD GHB EST-MCNC: 80 MG/DL (ref 68–126)
FASTING PATIENT LIPID ANSWER: YES
FASTING STATUS PATIENT QL REPORTED: YES
GLOBULIN PLAS-MCNC: 2.8 G/DL (ref 2–3.5)
GLOBULIN PLAS-MCNC: 2.9 G/DL (ref 2–3.5)
GLUCOSE BLD-MCNC: 110 MG/DL (ref 70–99)
GLUCOSE BLD-MCNC: 163 MG/DL (ref 70–99)
HBA1C MFR BLD: 4.4 % (ref ?–5.7)
HCT VFR BLD AUTO: 35 % (ref 35–48)
HDLC SERPL-MCNC: 13 MG/DL (ref 40–59)
HGB BLD-MCNC: 12.1 G/DL (ref 12–16)
IMM GRANULOCYTES # BLD AUTO: 0.04 X10(3) UL (ref 0–1)
IMM GRANULOCYTES NFR BLD: 0.3 %
IRON SATN MFR SERPL: 20 % (ref 15–50)
IRON SERPL-MCNC: 32 UG/DL (ref 50–170)
LDLC SERPL CALC-MCNC: 74 MG/DL (ref ?–100)
LYMPHOCYTES # BLD AUTO: 1.49 X10(3) UL (ref 1–4)
LYMPHOCYTES NFR BLD AUTO: 12.2 %
MAGNESIUM SERPL-MCNC: 1.5 MG/DL (ref 1.6–2.6)
MCH RBC QN AUTO: 36.4 PG (ref 26–34)
MCHC RBC AUTO-ENTMCNC: 34.6 G/DL (ref 31–37)
MCV RBC AUTO: 105.4 FL (ref 80–100)
MONOCYTES # BLD AUTO: 0.9 X10(3) UL (ref 0.1–1)
MONOCYTES NFR BLD AUTO: 7.4 %
NEUTROPHILS # BLD AUTO: 9.72 X10 (3) UL (ref 1.5–7.7)
NEUTROPHILS # BLD AUTO: 9.72 X10(3) UL (ref 1.5–7.7)
NEUTROPHILS NFR BLD AUTO: 79.5 %
NONHDLC SERPL-MCNC: 103 MG/DL (ref ?–130)
OSMOLALITY SERPL CALC.SUM OF ELEC: 274 MOSM/KG (ref 275–295)
OSMOLALITY SERPL CALC.SUM OF ELEC: 278 MOSM/KG (ref 275–295)
PLATELET # BLD AUTO: 296 10(3)UL (ref 150–450)
POTASSIUM SERPL-SCNC: 2.9 MMOL/L (ref 3.5–5.1)
POTASSIUM SERPL-SCNC: 2.9 MMOL/L (ref 3.5–5.1)
PROT SERPL-MCNC: 6.2 G/DL (ref 5.7–8.2)
PROT SERPL-MCNC: 6.6 G/DL (ref 5.7–8.2)
RBC # BLD AUTO: 3.32 X10(6)UL (ref 3.8–5.3)
SODIUM SERPL-SCNC: 130 MMOL/L (ref 136–145)
SODIUM SERPL-SCNC: 134 MMOL/L (ref 136–145)
T4 FREE SERPL-MCNC: 1.9 NG/DL (ref 0.8–1.7)
TOTAL IRON BINDING CAPACITY: 164 UG/DL (ref 250–425)
TRANSFERRIN SERPL-MCNC: 107 MG/DL (ref 250–380)
TRIGL SERPL-MCNC: 163 MG/DL (ref 30–149)
TSI SER-ACNC: 3.26 UIU/ML (ref 0.55–4.78)
VIT B12 SERPL-MCNC: 1163 PG/ML (ref 211–911)
VLDLC SERPL CALC-MCNC: 25 MG/DL (ref 0–30)
WBC # BLD AUTO: 12.2 X10(3) UL (ref 4–11)

## 2025-08-05 PROCEDURE — 86850 RBC ANTIBODY SCREEN: CPT

## 2025-08-05 PROCEDURE — 86901 BLOOD TYPING SEROLOGIC RH(D): CPT

## 2025-08-05 PROCEDURE — 85025 COMPLETE CBC W/AUTO DIFF WBC: CPT | Performed by: FAMILY MEDICINE

## 2025-08-05 PROCEDURE — 99285 EMERGENCY DEPT VISIT HI MDM: CPT

## 2025-08-05 PROCEDURE — 93010 ELECTROCARDIOGRAM REPORT: CPT

## 2025-08-05 PROCEDURE — 82607 VITAMIN B-12: CPT | Performed by: FAMILY MEDICINE

## 2025-08-05 PROCEDURE — 80061 LIPID PANEL: CPT | Performed by: FAMILY MEDICINE

## 2025-08-05 PROCEDURE — 86900 BLOOD TYPING SEROLOGIC ABO: CPT

## 2025-08-05 PROCEDURE — 80053 COMPREHEN METABOLIC PANEL: CPT | Performed by: FAMILY MEDICINE

## 2025-08-05 PROCEDURE — 84443 ASSAY THYROID STIM HORMONE: CPT | Performed by: FAMILY MEDICINE

## 2025-08-05 PROCEDURE — 99284 EMERGENCY DEPT VISIT MOD MDM: CPT

## 2025-08-05 PROCEDURE — 96365 THER/PROPH/DIAG IV INF INIT: CPT

## 2025-08-05 PROCEDURE — 83036 HEMOGLOBIN GLYCOSYLATED A1C: CPT | Performed by: FAMILY MEDICINE

## 2025-08-05 PROCEDURE — 83550 IRON BINDING TEST: CPT | Performed by: FAMILY MEDICINE

## 2025-08-05 PROCEDURE — 83735 ASSAY OF MAGNESIUM: CPT | Performed by: EMERGENCY MEDICINE

## 2025-08-05 PROCEDURE — 82728 ASSAY OF FERRITIN: CPT | Performed by: FAMILY MEDICINE

## 2025-08-05 PROCEDURE — 83540 ASSAY OF IRON: CPT | Performed by: FAMILY MEDICINE

## 2025-08-05 PROCEDURE — 84439 ASSAY OF FREE THYROXINE: CPT | Performed by: FAMILY MEDICINE

## 2025-08-05 PROCEDURE — 80053 COMPREHEN METABOLIC PANEL: CPT | Performed by: EMERGENCY MEDICINE

## 2025-08-05 PROCEDURE — 36415 COLL VENOUS BLD VENIPUNCTURE: CPT | Performed by: FAMILY MEDICINE

## 2025-08-05 PROCEDURE — 93005 ELECTROCARDIOGRAM TRACING: CPT

## 2025-08-05 RX ORDER — MAGNESIUM OXIDE 400 MG/1
400 TABLET ORAL DAILY
Qty: 3 TABLET | Refills: 0 | Status: SHIPPED | OUTPATIENT
Start: 2025-08-05 | End: 2025-08-13

## 2025-08-05 RX ORDER — POTASSIUM CHLORIDE 1.5 G/1.58G
20 POWDER, FOR SOLUTION ORAL DAILY
Qty: 3 PACKET | Refills: 0 | Status: SHIPPED | OUTPATIENT
Start: 2025-08-05 | End: 2025-08-13

## 2025-08-05 RX ORDER — MAGNESIUM SULFATE 1 G/100ML
1 INJECTION INTRAVENOUS ONCE
Status: COMPLETED | OUTPATIENT
Start: 2025-08-05 | End: 2025-08-05

## 2025-08-05 RX ORDER — POTASSIUM CHLORIDE 1500 MG/1
60 TABLET, EXTENDED RELEASE ORAL ONCE
Status: COMPLETED | OUTPATIENT
Start: 2025-08-05 | End: 2025-08-05

## 2025-08-06 LAB
ANTIBODY SCREEN: NEGATIVE
ATRIAL RATE: 93 BPM
P AXIS: 12 DEGREES
P-R INTERVAL: 144 MS
Q-T INTERVAL: 376 MS
QRS DURATION: 70 MS
QTC CALCULATION (BEZET): 467 MS
R AXIS: 4 DEGREES
RH BLOOD TYPE: POSITIVE
T AXIS: -3 DEGREES
VENTRICULAR RATE: 93 BPM

## 2025-08-07 RX ORDER — SODIUM CHLORIDE, SODIUM LACTATE, POTASSIUM CHLORIDE, CALCIUM CHLORIDE 600; 310; 30; 20 MG/100ML; MG/100ML; MG/100ML; MG/100ML
INJECTION, SOLUTION INTRAVENOUS CONTINUOUS
Status: DISCONTINUED | OUTPATIENT
Start: 2025-08-07 | End: 2025-08-07

## 2025-08-08 ENCOUNTER — ANESTHESIA (OUTPATIENT)
Dept: SURGERY | Facility: HOSPITAL | Age: 60
End: 2025-08-08

## 2025-08-08 ENCOUNTER — HOSPITAL ENCOUNTER (INPATIENT)
Facility: HOSPITAL | Age: 60
LOS: 5 days | Discharge: HOME HEALTH | End: 2025-08-13
Attending: OBSTETRICS & GYNECOLOGY | Admitting: OBSTETRICS & GYNECOLOGY

## 2025-08-08 ENCOUNTER — ANESTHESIA EVENT (OUTPATIENT)
Dept: SURGERY | Facility: HOSPITAL | Age: 60
End: 2025-08-08

## 2025-08-08 DIAGNOSIS — K57.92 ACUTE DIVERTICULITIS: Primary | ICD-10-CM

## 2025-08-08 DIAGNOSIS — Z01.818 PREOP TESTING: ICD-10-CM

## 2025-08-08 LAB
ALBUMIN SERPL-MCNC: 3.1 G/DL (ref 3.2–4.8)
ALBUMIN/GLOB SERPL: 1.6 (ref 1–2)
ALP LIVER SERPL-CCNC: 88 U/L (ref 46–118)
ALT SERPL-CCNC: 9 U/L (ref 10–49)
ANION GAP SERPL CALC-SCNC: 10 MMOL/L (ref 0–18)
ANION GAP SERPL CALC-SCNC: 16 MMOL/L (ref 0–18)
APTT PPP: 38.8 SECONDS (ref 23–36)
AST SERPL-CCNC: 24 U/L (ref ?–34)
BASOPHILS # BLD AUTO: 0.01 X10(3) UL (ref 0–0.2)
BASOPHILS # BLD AUTO: 0.01 X10(3) UL (ref 0–0.2)
BASOPHILS NFR BLD AUTO: 0.1 %
BASOPHILS NFR BLD AUTO: 0.1 %
BILIRUB SERPL-MCNC: 1.1 MG/DL (ref 0.3–1.2)
BUN BLD-MCNC: 11 MG/DL (ref 9–23)
BUN BLD-MCNC: 12 MG/DL (ref 9–23)
BUN/CREAT SERPL: 14.8 (ref 10–20)
BUN/CREAT SERPL: 17.5 (ref 10–20)
CALCIUM BLD-MCNC: 7 MG/DL (ref 8.7–10.4)
CALCIUM BLD-MCNC: 8.6 MG/DL (ref 8.7–10.4)
CHLORIDE SERPL-SCNC: 103 MMOL/L (ref 98–112)
CHLORIDE SERPL-SCNC: 95 MMOL/L (ref 98–112)
CO2 SERPL-SCNC: 22 MMOL/L (ref 21–32)
CO2 SERPL-SCNC: 24 MMOL/L (ref 21–32)
CREAT BLD-MCNC: 0.63 MG/DL (ref 0.55–1.02)
CREAT BLD-MCNC: 0.81 MG/DL (ref 0.55–1.02)
DEPRECATED RDW RBC AUTO: 57.6 FL (ref 35.1–46.3)
DEPRECATED RDW RBC AUTO: 58.3 FL (ref 35.1–46.3)
DEPRECATED RDW RBC AUTO: 58.4 FL (ref 35.1–46.3)
EGFRCR SERPLBLD CKD-EPI 2021: 102 ML/MIN/1.73M2 (ref 60–?)
EGFRCR SERPLBLD CKD-EPI 2021: 84 ML/MIN/1.73M2 (ref 60–?)
EOSINOPHIL # BLD AUTO: 0 X10(3) UL (ref 0–0.7)
EOSINOPHIL # BLD AUTO: 0 X10(3) UL (ref 0–0.7)
EOSINOPHIL NFR BLD AUTO: 0 %
EOSINOPHIL NFR BLD AUTO: 0 %
ERYTHROCYTE [DISTWIDTH] IN BLOOD BY AUTOMATED COUNT: 14.5 % (ref 11–15)
ERYTHROCYTE [DISTWIDTH] IN BLOOD BY AUTOMATED COUNT: 14.5 % (ref 11–15)
ERYTHROCYTE [DISTWIDTH] IN BLOOD BY AUTOMATED COUNT: 14.6 % (ref 11–15)
FIBRINOGEN PPP-MCNC: 390 MG/DL (ref 200–480)
GLOBULIN PLAS-MCNC: 2 G/DL (ref 2–3.5)
GLUCOSE BLD-MCNC: 163 MG/DL (ref 70–99)
GLUCOSE BLD-MCNC: 192 MG/DL (ref 70–99)
GLUCOSE BLDC GLUCOMTR-MCNC: 236 MG/DL (ref 70–99)
HCT VFR BLD AUTO: 19.6 % (ref 35–48)
HCT VFR BLD AUTO: 20.9 % (ref 35–48)
HCT VFR BLD AUTO: 27.8 % (ref 35–48)
HGB BLD-MCNC: 6.4 G/DL (ref 12–16)
HGB BLD-MCNC: 7 G/DL (ref 12–16)
HGB BLD-MCNC: 9.3 G/DL (ref 12–16)
IMM GRANULOCYTES # BLD AUTO: 0.04 X10(3) UL (ref 0–1)
IMM GRANULOCYTES # BLD AUTO: 0.05 X10(3) UL (ref 0–1)
IMM GRANULOCYTES NFR BLD: 0.4 %
IMM GRANULOCYTES NFR BLD: 0.4 %
INR BLD: 1.47 (ref 0.8–1.2)
INR BLD: 1.68 (ref 0.8–1.2)
LYMPHOCYTES # BLD AUTO: 0.39 X10(3) UL (ref 1–4)
LYMPHOCYTES # BLD AUTO: 0.47 X10(3) UL (ref 1–4)
LYMPHOCYTES NFR BLD AUTO: 3.6 %
LYMPHOCYTES NFR BLD AUTO: 4 %
MAGNESIUM SERPL-MCNC: 1.7 MG/DL (ref 1.6–2.6)
MCH RBC QN AUTO: 36.2 PG (ref 26–34)
MCH RBC QN AUTO: 36.3 PG (ref 26–34)
MCH RBC QN AUTO: 36.6 PG (ref 26–34)
MCHC RBC AUTO-ENTMCNC: 32.7 G/DL (ref 31–37)
MCHC RBC AUTO-ENTMCNC: 33.5 G/DL (ref 31–37)
MCHC RBC AUTO-ENTMCNC: 33.5 G/DL (ref 31–37)
MCV RBC AUTO: 108.6 FL (ref 80–100)
MCV RBC AUTO: 109.4 FL (ref 80–100)
MCV RBC AUTO: 110.7 FL (ref 80–100)
MONOCYTES # BLD AUTO: 0.5 X10(3) UL (ref 0.1–1)
MONOCYTES # BLD AUTO: 0.66 X10(3) UL (ref 0.1–1)
MONOCYTES NFR BLD AUTO: 5.1 %
MONOCYTES NFR BLD AUTO: 5.1 %
NEUTROPHILS # BLD AUTO: 11.86 X10 (3) UL (ref 1.5–7.7)
NEUTROPHILS # BLD AUTO: 11.86 X10(3) UL (ref 1.5–7.7)
NEUTROPHILS # BLD AUTO: 8.87 X10 (3) UL (ref 1.5–7.7)
NEUTROPHILS # BLD AUTO: 8.87 X10(3) UL (ref 1.5–7.7)
NEUTROPHILS NFR BLD AUTO: 90.4 %
NEUTROPHILS NFR BLD AUTO: 90.8 %
NON GYNE INTERPRETATION: NEGATIVE
OSMOLALITY SERPL CALC.SUM OF ELEC: 279 MOSM/KG (ref 275–295)
OSMOLALITY SERPL CALC.SUM OF ELEC: 289 MOSM/KG (ref 275–295)
PLATELET # BLD AUTO: 145 10(3)UL (ref 150–450)
PLATELET # BLD AUTO: 220 10(3)UL (ref 150–450)
PLATELET # BLD AUTO: 306 10(3)UL (ref 150–450)
POTASSIUM SERPL-SCNC: 3.2 MMOL/L (ref 3.5–5.1)
POTASSIUM SERPL-SCNC: 3.7 MMOL/L (ref 3.5–5.1)
PROT SERPL-MCNC: 5.1 G/DL (ref 5.7–8.2)
PROTHROMBIN TIME: 18.7 SECONDS (ref 11.6–14.8)
PROTHROMBIN TIME: 20.7 SECONDS (ref 11.6–14.8)
RBC # BLD AUTO: 1.77 X10(6)UL (ref 3.8–5.3)
RBC # BLD AUTO: 1.91 X10(6)UL (ref 3.8–5.3)
RBC # BLD AUTO: 2.56 X10(6)UL (ref 3.8–5.3)
RH BLOOD TYPE: POSITIVE
SODIUM SERPL-SCNC: 133 MMOL/L (ref 136–145)
SODIUM SERPL-SCNC: 137 MMOL/L (ref 136–145)
WBC # BLD AUTO: 13.1 X10(3) UL (ref 4–11)
WBC # BLD AUTO: 19.1 X10(3) UL (ref 4–11)
WBC # BLD AUTO: 9.8 X10(3) UL (ref 4–11)

## 2025-08-08 PROCEDURE — 0DNG4ZZ RELEASE LEFT LARGE INTESTINE, PERCUTANEOUS ENDOSCOPIC APPROACH: ICD-10-PCS | Performed by: OBSTETRICS & GYNECOLOGY

## 2025-08-08 PROCEDURE — 0UT74ZZ RESECTION OF BILATERAL FALLOPIAN TUBES, PERCUTANEOUS ENDOSCOPIC APPROACH: ICD-10-PCS | Performed by: OBSTETRICS & GYNECOLOGY

## 2025-08-08 PROCEDURE — 99223 1ST HOSP IP/OBS HIGH 75: CPT | Performed by: HOSPITALIST

## 2025-08-08 PROCEDURE — 99223 1ST HOSP IP/OBS HIGH 75: CPT | Performed by: INTERNAL MEDICINE

## 2025-08-08 PROCEDURE — 0TQB4ZZ REPAIR BLADDER, PERCUTANEOUS ENDOSCOPIC APPROACH: ICD-10-PCS | Performed by: OBSTETRICS & GYNECOLOGY

## 2025-08-08 PROCEDURE — 0DBP4ZZ EXCISION OF RECTUM, PERCUTANEOUS ENDOSCOPIC APPROACH: ICD-10-PCS | Performed by: SURGERY

## 2025-08-08 PROCEDURE — 8E0W4CZ ROBOTIC ASSISTED PROCEDURE OF TRUNK REGION, PERCUTANEOUS ENDOSCOPIC APPROACH: ICD-10-PCS | Performed by: OBSTETRICS & GYNECOLOGY

## 2025-08-08 PROCEDURE — 0TN74ZZ RELEASE LEFT URETER, PERCUTANEOUS ENDOSCOPIC APPROACH: ICD-10-PCS | Performed by: OBSTETRICS & GYNECOLOGY

## 2025-08-08 PROCEDURE — 0UT94ZZ RESECTION OF UTERUS, PERCUTANEOUS ENDOSCOPIC APPROACH: ICD-10-PCS | Performed by: OBSTETRICS & GYNECOLOGY

## 2025-08-08 PROCEDURE — 0UT24ZZ RESECTION OF BILATERAL OVARIES, PERCUTANEOUS ENDOSCOPIC APPROACH: ICD-10-PCS | Performed by: OBSTETRICS & GYNECOLOGY

## 2025-08-08 PROCEDURE — P9045 ALBUMIN (HUMAN), 5%, 250 ML: HCPCS | Performed by: NURSE ANESTHETIST, CERTIFIED REGISTERED

## 2025-08-08 PROCEDURE — 0D1E4Z4 BYPASS LARGE INTESTINE TO CUTANEOUS, PERCUTANEOUS ENDOSCOPIC APPROACH: ICD-10-PCS | Performed by: SURGERY

## 2025-08-08 PROCEDURE — 0DJD8ZZ INSPECTION OF LOWER INTESTINAL TRACT, VIA NATURAL OR ARTIFICIAL OPENING ENDOSCOPIC: ICD-10-PCS | Performed by: SURGERY

## 2025-08-08 PROCEDURE — 36430 TRANSFUSION BLD/BLD COMPNT: CPT | Performed by: ANESTHESIOLOGY

## 2025-08-08 PROCEDURE — 0DTN4ZZ RESECTION OF SIGMOID COLON, PERCUTANEOUS ENDOSCOPIC APPROACH: ICD-10-PCS | Performed by: SURGERY

## 2025-08-08 RX ORDER — MORPHINE SULFATE 10 MG/ML
6 INJECTION, SOLUTION INTRAMUSCULAR; INTRAVENOUS EVERY 10 MIN PRN
Status: DISCONTINUED | OUTPATIENT
Start: 2025-08-08 | End: 2025-08-08 | Stop reason: HOSPADM

## 2025-08-08 RX ORDER — ALBUMIN HUMAN 50 G/1000ML
SOLUTION INTRAVENOUS CONTINUOUS PRN
Status: DISCONTINUED | OUTPATIENT
Start: 2025-08-08 | End: 2025-08-08 | Stop reason: SURG

## 2025-08-08 RX ORDER — DIPHENHYDRAMINE HYDROCHLORIDE 50 MG/ML
12.5 INJECTION, SOLUTION INTRAMUSCULAR; INTRAVENOUS EVERY 4 HOURS PRN
Status: DISCONTINUED | OUTPATIENT
Start: 2025-08-08 | End: 2025-08-12

## 2025-08-08 RX ORDER — ONDANSETRON 2 MG/ML
4 INJECTION INTRAMUSCULAR; INTRAVENOUS EVERY 6 HOURS PRN
Status: DISCONTINUED | OUTPATIENT
Start: 2025-08-08 | End: 2025-08-12

## 2025-08-08 RX ORDER — SODIUM CHLORIDE 9 MG/ML
INJECTION, SOLUTION INTRAVENOUS CONTINUOUS
Status: DISCONTINUED | OUTPATIENT
Start: 2025-08-08 | End: 2025-08-12

## 2025-08-08 RX ORDER — SODIUM CHLORIDE 9 MG/ML
INJECTION, SOLUTION INTRAVENOUS ONCE
Status: COMPLETED | OUTPATIENT
Start: 2025-08-08 | End: 2025-08-09

## 2025-08-08 RX ORDER — NICOTINE POLACRILEX 4 MG
15 LOZENGE BUCCAL
Status: DISCONTINUED | OUTPATIENT
Start: 2025-08-08 | End: 2025-08-13

## 2025-08-08 RX ORDER — FAMOTIDINE 20 MG/1
20 TABLET, FILM COATED ORAL 2 TIMES DAILY
Status: DISCONTINUED | OUTPATIENT
Start: 2025-08-08 | End: 2025-08-13

## 2025-08-08 RX ORDER — MORPHINE SULFATE 4 MG/ML
4 INJECTION, SOLUTION INTRAMUSCULAR; INTRAVENOUS EVERY 10 MIN PRN
Status: DISCONTINUED | OUTPATIENT
Start: 2025-08-08 | End: 2025-08-08 | Stop reason: HOSPADM

## 2025-08-08 RX ORDER — NALOXONE HYDROCHLORIDE 0.4 MG/ML
0.08 INJECTION, SOLUTION INTRAMUSCULAR; INTRAVENOUS; SUBCUTANEOUS AS NEEDED
Status: DISCONTINUED | OUTPATIENT
Start: 2025-08-08 | End: 2025-08-08 | Stop reason: HOSPADM

## 2025-08-08 RX ORDER — MAGNESIUM SULFATE HEPTAHYDRATE 500 MG/ML
INJECTION, SOLUTION INTRAMUSCULAR; INTRAVENOUS AS NEEDED
Status: DISCONTINUED | OUTPATIENT
Start: 2025-08-08 | End: 2025-08-08 | Stop reason: SURG

## 2025-08-08 RX ORDER — ROCURONIUM BROMIDE 10 MG/ML
INJECTION, SOLUTION INTRAVENOUS AS NEEDED
Status: DISCONTINUED | OUTPATIENT
Start: 2025-08-08 | End: 2025-08-08 | Stop reason: SURG

## 2025-08-08 RX ORDER — SENNOSIDES 8.6 MG
17.2 TABLET ORAL NIGHTLY PRN
Status: DISCONTINUED | OUTPATIENT
Start: 2025-08-08 | End: 2025-08-13

## 2025-08-08 RX ORDER — MORPHINE SULFATE 4 MG/ML
2 INJECTION, SOLUTION INTRAMUSCULAR; INTRAVENOUS EVERY 10 MIN PRN
Status: DISCONTINUED | OUTPATIENT
Start: 2025-08-08 | End: 2025-08-08 | Stop reason: HOSPADM

## 2025-08-08 RX ORDER — LABETALOL HYDROCHLORIDE 5 MG/ML
5 INJECTION, SOLUTION INTRAVENOUS EVERY 5 MIN PRN
Status: DISCONTINUED | OUTPATIENT
Start: 2025-08-08 | End: 2025-08-08 | Stop reason: HOSPADM

## 2025-08-08 RX ORDER — EPHEDRINE SULFATE 50 MG/ML
INJECTION, SOLUTION INTRAVENOUS AS NEEDED
Status: DISCONTINUED | OUTPATIENT
Start: 2025-08-08 | End: 2025-08-08 | Stop reason: SURG

## 2025-08-08 RX ORDER — NALOXONE HYDROCHLORIDE 0.4 MG/ML
0.08 INJECTION, SOLUTION INTRAMUSCULAR; INTRAVENOUS; SUBCUTANEOUS
Status: DISCONTINUED | OUTPATIENT
Start: 2025-08-08 | End: 2025-08-12

## 2025-08-08 RX ORDER — HYDROMORPHONE HYDROCHLORIDE 1 MG/ML
0.6 INJECTION, SOLUTION INTRAMUSCULAR; INTRAVENOUS; SUBCUTANEOUS EVERY 5 MIN PRN
Status: DISCONTINUED | OUTPATIENT
Start: 2025-08-08 | End: 2025-08-08 | Stop reason: HOSPADM

## 2025-08-08 RX ORDER — MIDAZOLAM HYDROCHLORIDE 1 MG/ML
INJECTION INTRAMUSCULAR; INTRAVENOUS AS NEEDED
Status: DISCONTINUED | OUTPATIENT
Start: 2025-08-08 | End: 2025-08-08 | Stop reason: SURG

## 2025-08-08 RX ORDER — SODIUM CHLORIDE 9 MG/ML
INJECTION, SOLUTION INTRAVENOUS ONCE
Status: COMPLETED | OUTPATIENT
Start: 2025-08-08 | End: 2025-08-08

## 2025-08-08 RX ORDER — BUPIVACAINE HYDROCHLORIDE 5 MG/ML
INJECTION, SOLUTION EPIDURAL; INTRACAUDAL; PERINEURAL AS NEEDED
Status: DISCONTINUED | OUTPATIENT
Start: 2025-08-08 | End: 2025-08-08 | Stop reason: HOSPADM

## 2025-08-08 RX ORDER — PROCHLORPERAZINE EDISYLATE 5 MG/ML
5 INJECTION INTRAMUSCULAR; INTRAVENOUS EVERY 8 HOURS PRN
Status: DISCONTINUED | OUTPATIENT
Start: 2025-08-08 | End: 2025-08-08 | Stop reason: HOSPADM

## 2025-08-08 RX ORDER — KETAMINE HYDROCHLORIDE 50 MG/ML
INJECTION, SOLUTION INTRAMUSCULAR; INTRAVENOUS AS NEEDED
Status: DISCONTINUED | OUTPATIENT
Start: 2025-08-08 | End: 2025-08-08 | Stop reason: SURG

## 2025-08-08 RX ORDER — SODIUM CHLORIDE, SODIUM LACTATE, POTASSIUM CHLORIDE, CALCIUM CHLORIDE 600; 310; 30; 20 MG/100ML; MG/100ML; MG/100ML; MG/100ML
INJECTION, SOLUTION INTRAVENOUS CONTINUOUS PRN
Status: DISCONTINUED | OUTPATIENT
Start: 2025-08-08 | End: 2025-08-08 | Stop reason: SURG

## 2025-08-08 RX ORDER — POLYETHYLENE GLYCOL 3350 17 G/17G
17 POWDER, FOR SOLUTION ORAL DAILY PRN
Status: DISCONTINUED | OUTPATIENT
Start: 2025-08-08 | End: 2025-08-13

## 2025-08-08 RX ORDER — DEXTROSE MONOHYDRATE 25 G/50ML
50 INJECTION, SOLUTION INTRAVENOUS
Status: DISCONTINUED | OUTPATIENT
Start: 2025-08-08 | End: 2025-08-13

## 2025-08-08 RX ORDER — MAGNESIUM SULFATE HEPTAHYDRATE 40 MG/ML
2 INJECTION, SOLUTION INTRAVENOUS ONCE
Status: COMPLETED | OUTPATIENT
Start: 2025-08-08 | End: 2025-08-08

## 2025-08-08 RX ORDER — CARVEDILOL 6.25 MG/1
6.25 TABLET ORAL 2 TIMES DAILY WITH MEALS
Status: DISCONTINUED | OUTPATIENT
Start: 2025-08-08 | End: 2025-08-13

## 2025-08-08 RX ORDER — HEPARIN SODIUM 5000 [USP'U]/ML
5000 INJECTION, SOLUTION INTRAVENOUS; SUBCUTANEOUS
Status: COMPLETED | OUTPATIENT
Start: 2025-08-08 | End: 2025-08-08

## 2025-08-08 RX ORDER — LIDOCAINE HYDROCHLORIDE 10 MG/ML
INJECTION, SOLUTION EPIDURAL; INFILTRATION; INTRACAUDAL; PERINEURAL AS NEEDED
Status: DISCONTINUED | OUTPATIENT
Start: 2025-08-08 | End: 2025-08-08 | Stop reason: SURG

## 2025-08-08 RX ORDER — SODIUM CHLORIDE, SODIUM LACTATE, POTASSIUM CHLORIDE, CALCIUM CHLORIDE 600; 310; 30; 20 MG/100ML; MG/100ML; MG/100ML; MG/100ML
100 INJECTION, SOLUTION INTRAVENOUS CONTINUOUS
Status: DISCONTINUED | OUTPATIENT
Start: 2025-08-08 | End: 2025-08-10

## 2025-08-08 RX ORDER — METRONIDAZOLE 500 MG/100ML
500 INJECTION, SOLUTION INTRAVENOUS ONCE
Status: COMPLETED | OUTPATIENT
Start: 2025-08-08 | End: 2025-08-08

## 2025-08-08 RX ORDER — ALPRAZOLAM 0.5 MG
0.5 TABLET ORAL ONCE
Status: COMPLETED | OUTPATIENT
Start: 2025-08-08 | End: 2025-08-08

## 2025-08-08 RX ORDER — ONDANSETRON 2 MG/ML
4 INJECTION INTRAMUSCULAR; INTRAVENOUS EVERY 6 HOURS PRN
Status: DISCONTINUED | OUTPATIENT
Start: 2025-08-08 | End: 2025-08-08 | Stop reason: HOSPADM

## 2025-08-08 RX ORDER — DEXAMETHASONE SODIUM PHOSPHATE 4 MG/ML
VIAL (ML) INJECTION AS NEEDED
Status: DISCONTINUED | OUTPATIENT
Start: 2025-08-08 | End: 2025-08-08 | Stop reason: SURG

## 2025-08-08 RX ORDER — ONDANSETRON 2 MG/ML
4 INJECTION INTRAMUSCULAR; INTRAVENOUS EVERY 6 HOURS PRN
Status: DISCONTINUED | OUTPATIENT
Start: 2025-08-08 | End: 2025-08-13

## 2025-08-08 RX ORDER — MAGNESIUM OXIDE 400 MG/1
400 TABLET ORAL DAILY
Status: DISCONTINUED | OUTPATIENT
Start: 2025-08-08 | End: 2025-08-13

## 2025-08-08 RX ORDER — ACETAMINOPHEN 500 MG
1000 TABLET ORAL ONCE
Status: DISCONTINUED | OUTPATIENT
Start: 2025-08-08 | End: 2025-08-08 | Stop reason: HOSPADM

## 2025-08-08 RX ORDER — HYDROMORPHONE HYDROCHLORIDE 1 MG/ML
0.2 INJECTION, SOLUTION INTRAMUSCULAR; INTRAVENOUS; SUBCUTANEOUS EVERY 5 MIN PRN
Status: DISCONTINUED | OUTPATIENT
Start: 2025-08-08 | End: 2025-08-08 | Stop reason: HOSPADM

## 2025-08-08 RX ORDER — FAMOTIDINE 10 MG/ML
20 INJECTION, SOLUTION INTRAVENOUS 2 TIMES DAILY
Status: DISCONTINUED | OUTPATIENT
Start: 2025-08-08 | End: 2025-08-13

## 2025-08-08 RX ORDER — LIDOCAINE HYDROCHLORIDE 40 MG/ML
SOLUTION TOPICAL AS NEEDED
Status: DISCONTINUED | OUTPATIENT
Start: 2025-08-08 | End: 2025-08-08 | Stop reason: SURG

## 2025-08-08 RX ORDER — PHENYLEPHRINE HCL 10 MG/ML
VIAL (ML) INJECTION AS NEEDED
Status: DISCONTINUED | OUTPATIENT
Start: 2025-08-08 | End: 2025-08-08 | Stop reason: SURG

## 2025-08-08 RX ORDER — HYDROMORPHONE HYDROCHLORIDE 1 MG/ML
0.4 INJECTION, SOLUTION INTRAMUSCULAR; INTRAVENOUS; SUBCUTANEOUS EVERY 5 MIN PRN
Status: DISCONTINUED | OUTPATIENT
Start: 2025-08-08 | End: 2025-08-08 | Stop reason: HOSPADM

## 2025-08-08 RX ORDER — METOPROLOL TARTRATE 25 MG/1
25 TABLET, FILM COATED ORAL ONCE AS NEEDED
Status: COMPLETED | OUTPATIENT
Start: 2025-08-08 | End: 2025-08-08

## 2025-08-08 RX ORDER — ALBUMIN HUMAN 50 G/1000ML
12.5 SOLUTION INTRAVENOUS ONCE
Status: COMPLETED | OUTPATIENT
Start: 2025-08-08 | End: 2025-08-08

## 2025-08-08 RX ORDER — SODIUM CHLORIDE 9 MG/ML
INJECTION, SOLUTION INTRAVENOUS ONCE
Status: DISCONTINUED | OUTPATIENT
Start: 2025-08-08 | End: 2025-08-08

## 2025-08-08 RX ORDER — TRAZODONE HYDROCHLORIDE 100 MG/1
100 TABLET ORAL NIGHTLY PRN
Status: DISCONTINUED | OUTPATIENT
Start: 2025-08-08 | End: 2025-08-13

## 2025-08-08 RX ORDER — ENOXAPARIN SODIUM 100 MG/ML
40 INJECTION SUBCUTANEOUS DAILY
Status: DISCONTINUED | OUTPATIENT
Start: 2025-08-09 | End: 2025-08-13

## 2025-08-08 RX ORDER — ONDANSETRON 2 MG/ML
INJECTION INTRAMUSCULAR; INTRAVENOUS AS NEEDED
Status: DISCONTINUED | OUTPATIENT
Start: 2025-08-08 | End: 2025-08-08 | Stop reason: SURG

## 2025-08-08 RX ORDER — SODIUM CHLORIDE 9 MG/ML
INJECTION, SOLUTION INTRAVENOUS CONTINUOUS PRN
Status: DISCONTINUED | OUTPATIENT
Start: 2025-08-08 | End: 2025-08-08 | Stop reason: SURG

## 2025-08-08 RX ORDER — ACETAMINOPHEN 500 MG
1000 TABLET ORAL EVERY 8 HOURS SCHEDULED
Status: DISCONTINUED | OUTPATIENT
Start: 2025-08-08 | End: 2025-08-13

## 2025-08-08 RX ORDER — PROCHLORPERAZINE EDISYLATE 5 MG/ML
5 INJECTION INTRAMUSCULAR; INTRAVENOUS EVERY 8 HOURS PRN
Status: DISCONTINUED | OUTPATIENT
Start: 2025-08-08 | End: 2025-08-13

## 2025-08-08 RX ORDER — NICOTINE POLACRILEX 4 MG
30 LOZENGE BUCCAL
Status: DISCONTINUED | OUTPATIENT
Start: 2025-08-08 | End: 2025-08-13

## 2025-08-08 RX ORDER — ALBUTEROL SULFATE 90 UG/1
INHALANT RESPIRATORY (INHALATION) AS NEEDED
Status: DISCONTINUED | OUTPATIENT
Start: 2025-08-08 | End: 2025-08-08 | Stop reason: SURG

## 2025-08-08 RX ORDER — SODIUM CHLORIDE, SODIUM LACTATE, POTASSIUM CHLORIDE, CALCIUM CHLORIDE 600; 310; 30; 20 MG/100ML; MG/100ML; MG/100ML; MG/100ML
INJECTION, SOLUTION INTRAVENOUS CONTINUOUS
Status: DISCONTINUED | OUTPATIENT
Start: 2025-08-08 | End: 2025-08-08 | Stop reason: HOSPADM

## 2025-08-08 RX ADMIN — KETAMINE HYDROCHLORIDE 10 MG: 50 INJECTION, SOLUTION INTRAMUSCULAR; INTRAVENOUS at 10:34:00

## 2025-08-08 RX ADMIN — LIDOCAINE HYDROCHLORIDE 2 ML: 40 SOLUTION TOPICAL at 10:07:00

## 2025-08-08 RX ADMIN — DEXAMETHASONE SODIUM PHOSPHATE 6 MG: 4 MG/ML VIAL (ML) INJECTION at 10:12:00

## 2025-08-08 RX ADMIN — SODIUM CHLORIDE: 9 INJECTION, SOLUTION INTRAVENOUS at 12:39:00

## 2025-08-08 RX ADMIN — PHENYLEPHRINE HCL 200 MCG: 10 MG/ML VIAL (ML) INJECTION at 11:37:00

## 2025-08-08 RX ADMIN — ALBUTEROL SULFATE 2 PUFF: 90 INHALANT RESPIRATORY (INHALATION) at 09:55:00

## 2025-08-08 RX ADMIN — ROCURONIUM BROMIDE 40 MG: 10 INJECTION, SOLUTION INTRAVENOUS at 10:12:00

## 2025-08-08 RX ADMIN — PHENYLEPHRINE HCL 200 MCG: 10 MG/ML VIAL (ML) INJECTION at 11:16:00

## 2025-08-08 RX ADMIN — KETAMINE HYDROCHLORIDE 10 MG: 50 INJECTION, SOLUTION INTRAMUSCULAR; INTRAVENOUS at 10:38:00

## 2025-08-08 RX ADMIN — PHENYLEPHRINE HCL 100 MCG: 10 MG/ML VIAL (ML) INJECTION at 12:11:00

## 2025-08-08 RX ADMIN — PHENYLEPHRINE HCL 100 MCG: 10 MG/ML VIAL (ML) INJECTION at 12:33:00

## 2025-08-08 RX ADMIN — SODIUM CHLORIDE, SODIUM LACTATE, POTASSIUM CHLORIDE, CALCIUM CHLORIDE: 600; 310; 30; 20 INJECTION, SOLUTION INTRAVENOUS at 11:29:00

## 2025-08-08 RX ADMIN — LIDOCAINE HYDROCHLORIDE 40 MG: 10 INJECTION, SOLUTION EPIDURAL; INFILTRATION; INTRACAUDAL; PERINEURAL at 10:03:00

## 2025-08-08 RX ADMIN — ONDANSETRON 4 MG: 2 INJECTION INTRAMUSCULAR; INTRAVENOUS at 13:32:00

## 2025-08-08 RX ADMIN — SODIUM CHLORIDE: 9 INJECTION, SOLUTION INTRAVENOUS at 11:34:00

## 2025-08-08 RX ADMIN — ALBUMIN HUMAN: 50 SOLUTION INTRAVENOUS at 11:57:00

## 2025-08-08 RX ADMIN — PHENYLEPHRINE HCL 100 MCG: 10 MG/ML VIAL (ML) INJECTION at 10:31:00

## 2025-08-08 RX ADMIN — ROCURONIUM BROMIDE 10 MG: 10 INJECTION, SOLUTION INTRAVENOUS at 11:28:00

## 2025-08-08 RX ADMIN — ROCURONIUM BROMIDE 5 MG: 10 INJECTION, SOLUTION INTRAVENOUS at 13:16:00

## 2025-08-08 RX ADMIN — METRONIDAZOLE 500 MG: 500 INJECTION, SOLUTION INTRAVENOUS at 09:55:00

## 2025-08-08 RX ADMIN — PHENYLEPHRINE HCL 100 MCG: 10 MG/ML VIAL (ML) INJECTION at 10:09:00

## 2025-08-08 RX ADMIN — SODIUM CHLORIDE: 9 INJECTION, SOLUTION INTRAVENOUS at 10:10:00

## 2025-08-08 RX ADMIN — KETAMINE HYDROCHLORIDE 10 MG: 50 INJECTION, SOLUTION INTRAMUSCULAR; INTRAVENOUS at 12:41:00

## 2025-08-08 RX ADMIN — MAGNESIUM SULFATE HEPTAHYDRATE 2 G: 500 INJECTION, SOLUTION INTRAMUSCULAR; INTRAVENOUS at 10:43:00

## 2025-08-08 RX ADMIN — SODIUM CHLORIDE, SODIUM LACTATE, POTASSIUM CHLORIDE, CALCIUM CHLORIDE: 600; 310; 30; 20 INJECTION, SOLUTION INTRAVENOUS at 13:32:00

## 2025-08-08 RX ADMIN — MIDAZOLAM HYDROCHLORIDE 1 MG: 1 INJECTION INTRAMUSCULAR; INTRAVENOUS at 09:55:00

## 2025-08-08 RX ADMIN — SODIUM CHLORIDE: 9 INJECTION, SOLUTION INTRAVENOUS at 13:46:00

## 2025-08-08 RX ADMIN — EPHEDRINE SULFATE 5 MG: 50 INJECTION, SOLUTION INTRAVENOUS at 11:37:00

## 2025-08-08 RX ADMIN — SODIUM CHLORIDE: 9 INJECTION, SOLUTION INTRAVENOUS at 09:55:00

## 2025-08-09 PROBLEM — N17.9 AKI (ACUTE KIDNEY INJURY): Status: ACTIVE | Noted: 2025-08-09

## 2025-08-09 LAB
ALBUMIN SERPL-MCNC: 3.2 G/DL (ref 3.2–4.8)
ANION GAP SERPL CALC-SCNC: 3 MMOL/L (ref 0–18)
ANION GAP SERPL CALC-SCNC: 5 MMOL/L (ref 0–18)
BASOPHILS # BLD AUTO: 0.01 X10(3) UL (ref 0–0.2)
BASOPHILS # BLD AUTO: 0.02 X10(3) UL (ref 0–0.2)
BASOPHILS NFR BLD AUTO: 0.1 %
BASOPHILS NFR BLD AUTO: 0.1 %
BILIRUB UR QL: NEGATIVE
BLOOD TYPE BARCODE: 5100
BUN BLD-MCNC: 9 MG/DL (ref 9–23)
BUN BLD-MCNC: 9 MG/DL (ref 9–23)
BUN/CREAT SERPL: 16.4 (ref 10–20)
BUN/CREAT SERPL: 16.7 (ref 10–20)
CA-I BLD-SCNC: 0.96 MMOL/L (ref 0.95–1.32)
CALCIUM BLD-MCNC: 7 MG/DL (ref 8.7–10.4)
CALCIUM BLD-MCNC: 7.3 MG/DL (ref 8.7–10.4)
CHLORIDE SERPL-SCNC: 103 MMOL/L (ref 98–112)
CHLORIDE SERPL-SCNC: 105 MMOL/L (ref 98–112)
CO2 SERPL-SCNC: 28 MMOL/L (ref 21–32)
CO2 SERPL-SCNC: 28 MMOL/L (ref 21–32)
COLOR UR: YELLOW
CREAT BLD-MCNC: 0.54 MG/DL (ref 0.55–1.02)
CREAT BLD-MCNC: 0.55 MG/DL (ref 0.55–1.02)
CREAT UR-SCNC: 122 MG/DL
DEPRECATED RDW RBC AUTO: 59.8 FL (ref 35.1–46.3)
DEPRECATED RDW RBC AUTO: 61.1 FL (ref 35.1–46.3)
EGFRCR SERPLBLD CKD-EPI 2021: 106 ML/MIN/1.73M2 (ref 60–?)
EGFRCR SERPLBLD CKD-EPI 2021: 106 ML/MIN/1.73M2 (ref 60–?)
EOSINOPHIL # BLD AUTO: 0 X10(3) UL (ref 0–0.7)
EOSINOPHIL # BLD AUTO: 0 X10(3) UL (ref 0–0.7)
EOSINOPHIL NFR BLD AUTO: 0 %
EOSINOPHIL NFR BLD AUTO: 0 %
ERYTHROCYTE [DISTWIDTH] IN BLOOD BY AUTOMATED COUNT: 15.9 % (ref 11–15)
ERYTHROCYTE [DISTWIDTH] IN BLOOD BY AUTOMATED COUNT: 15.9 % (ref 11–15)
GLUCOSE BLD-MCNC: 175 MG/DL (ref 70–99)
GLUCOSE BLD-MCNC: 195 MG/DL (ref 70–99)
GLUCOSE BLDC GLUCOMTR-MCNC: 155 MG/DL (ref 70–99)
GLUCOSE BLDC GLUCOMTR-MCNC: 190 MG/DL (ref 70–99)
GLUCOSE BLDC GLUCOMTR-MCNC: 192 MG/DL (ref 70–99)
GLUCOSE BLDC GLUCOMTR-MCNC: 201 MG/DL (ref 70–99)
GLUCOSE BLDC GLUCOMTR-MCNC: 278 MG/DL (ref 70–99)
GLUCOSE UR-MCNC: 30 MG/DL
HCT VFR BLD AUTO: 24 % (ref 35–48)
HCT VFR BLD AUTO: 25.2 % (ref 35–48)
HGB BLD-MCNC: 8.3 G/DL (ref 12–16)
HGB BLD-MCNC: 8.7 G/DL (ref 12–16)
IMM GRANULOCYTES # BLD AUTO: 0.04 X10(3) UL (ref 0–1)
IMM GRANULOCYTES # BLD AUTO: 0.08 X10(3) UL (ref 0–1)
IMM GRANULOCYTES NFR BLD: 0.4 %
IMM GRANULOCYTES NFR BLD: 0.6 %
KETONES UR-MCNC: 10 MG/DL
LEUKOCYTE ESTERASE UR QL STRIP.AUTO: 25
LYMPHOCYTES # BLD AUTO: 0.48 X10(3) UL (ref 1–4)
LYMPHOCYTES # BLD AUTO: 0.66 X10(3) UL (ref 1–4)
LYMPHOCYTES NFR BLD AUTO: 4.8 %
LYMPHOCYTES NFR BLD AUTO: 5 %
MAGNESIUM SERPL-MCNC: 2.2 MG/DL (ref 1.6–2.6)
MCH RBC QN AUTO: 35.9 PG (ref 26–34)
MCH RBC QN AUTO: 36.3 PG (ref 26–34)
MCHC RBC AUTO-ENTMCNC: 34.5 G/DL (ref 31–37)
MCHC RBC AUTO-ENTMCNC: 34.6 G/DL (ref 31–37)
MCV RBC AUTO: 103.9 FL (ref 80–100)
MCV RBC AUTO: 105 FL (ref 80–100)
MONOCYTES # BLD AUTO: 0.57 X10(3) UL (ref 0.1–1)
MONOCYTES # BLD AUTO: 0.7 X10(3) UL (ref 0.1–1)
MONOCYTES NFR BLD AUTO: 5.1 %
MONOCYTES NFR BLD AUTO: 5.9 %
NEUTROPHILS # BLD AUTO: 12.32 X10 (3) UL (ref 1.5–7.7)
NEUTROPHILS # BLD AUTO: 12.32 X10(3) UL (ref 1.5–7.7)
NEUTROPHILS # BLD AUTO: 8.49 X10 (3) UL (ref 1.5–7.7)
NEUTROPHILS # BLD AUTO: 8.49 X10(3) UL (ref 1.5–7.7)
NEUTROPHILS NFR BLD AUTO: 88.6 %
NEUTROPHILS NFR BLD AUTO: 89.4 %
NITRITE UR QL STRIP.AUTO: NEGATIVE
OSMOLALITY SERPL CALC.SUM OF ELEC: 285 MOSM/KG (ref 275–295)
OSMOLALITY SERPL CALC.SUM OF ELEC: 286 MOSM/KG (ref 275–295)
PH UR: 6 (ref 5–8)
PHOSPHATE SERPL-MCNC: 2.1 MG/DL (ref 2.4–5.1)
PLATELET # BLD AUTO: 139 10(3)UL (ref 150–450)
PLATELET # BLD AUTO: 151 10(3)UL (ref 150–450)
POTASSIUM SERPL-SCNC: 3.2 MMOL/L (ref 3.5–5.1)
POTASSIUM SERPL-SCNC: 3.8 MMOL/L (ref 3.5–5.1)
POTASSIUM SERPL-SCNC: 3.8 MMOL/L (ref 3.5–5.1)
PROT UR-MCNC: 70 MG/DL
PUNCTURE CHARGE: NO
RBC # BLD AUTO: 2.31 X10(6)UL (ref 3.8–5.3)
RBC # BLD AUTO: 2.4 X10(6)UL (ref 3.8–5.3)
SODIUM SERPL-SCNC: 136 MMOL/L (ref 136–145)
SODIUM SERPL-SCNC: 136 MMOL/L (ref 136–145)
SODIUM SERPL-SCNC: 27 MMOL/L
SP GR UR STRIP: >1.03 (ref 1–1.03)
UNIT VOLUME: 332 ML
UROBILINOGEN UR STRIP-ACNC: NORMAL
WBC # BLD AUTO: 13.8 X10(3) UL (ref 4–11)
WBC # BLD AUTO: 9.6 X10(3) UL (ref 4–11)

## 2025-08-09 PROCEDURE — 99223 1ST HOSP IP/OBS HIGH 75: CPT | Performed by: INTERNAL MEDICINE

## 2025-08-09 PROCEDURE — 99233 SBSQ HOSP IP/OBS HIGH 50: CPT | Performed by: INTERNAL MEDICINE

## 2025-08-09 PROCEDURE — 30233N1 TRANSFUSION OF NONAUTOLOGOUS RED BLOOD CELLS INTO PERIPHERAL VEIN, PERCUTANEOUS APPROACH: ICD-10-PCS | Performed by: HOSPITALIST

## 2025-08-09 PROCEDURE — 99233 SBSQ HOSP IP/OBS HIGH 50: CPT | Performed by: HOSPITALIST

## 2025-08-09 RX ORDER — HALOPERIDOL 5 MG/ML
2 INJECTION INTRAMUSCULAR EVERY 6 HOURS PRN
Status: DISCONTINUED | OUTPATIENT
Start: 2025-08-09 | End: 2025-08-13

## 2025-08-09 RX ORDER — ALBUMIN (HUMAN) 12.5 G/50ML
12.5 SOLUTION INTRAVENOUS ONCE
Status: COMPLETED | OUTPATIENT
Start: 2025-08-09 | End: 2025-08-09

## 2025-08-09 RX ORDER — POTASSIUM CHLORIDE 1500 MG/1
40 TABLET, EXTENDED RELEASE ORAL ONCE
Status: COMPLETED | OUTPATIENT
Start: 2025-08-09 | End: 2025-08-09

## 2025-08-09 RX ORDER — ALBUMIN (HUMAN) 12.5 G/50ML
12.5 SOLUTION INTRAVENOUS EVERY 8 HOURS
Status: DISCONTINUED | OUTPATIENT
Start: 2025-08-09 | End: 2025-08-09

## 2025-08-09 RX ORDER — MIDODRINE HYDROCHLORIDE 5 MG/1
5 TABLET ORAL
Status: DISCONTINUED | OUTPATIENT
Start: 2025-08-09 | End: 2025-08-09

## 2025-08-09 RX ORDER — MIDODRINE HYDROCHLORIDE 5 MG/1
5 TABLET ORAL 3 TIMES DAILY
Status: DISCONTINUED | OUTPATIENT
Start: 2025-08-09 | End: 2025-08-12

## 2025-08-09 RX ORDER — ALBUMIN HUMAN 50 G/1000ML
25 SOLUTION INTRAVENOUS ONCE
Status: COMPLETED | OUTPATIENT
Start: 2025-08-09 | End: 2025-08-09

## 2025-08-09 RX ORDER — SODIUM CHLORIDE 9 MG/ML
INJECTION, SOLUTION INTRAVENOUS CONTINUOUS
Status: DISCONTINUED | OUTPATIENT
Start: 2025-08-09 | End: 2025-08-10

## 2025-08-10 ENCOUNTER — APPOINTMENT (OUTPATIENT)
Dept: ULTRASOUND IMAGING | Facility: HOSPITAL | Age: 60
End: 2025-08-10
Attending: INTERNAL MEDICINE

## 2025-08-10 PROBLEM — E83.39 HYPOPHOSPHATEMIA: Status: ACTIVE | Noted: 2025-08-10

## 2025-08-10 LAB
ALBUMIN SERPL-MCNC: 3.5 G/DL (ref 3.2–4.8)
ALBUMIN/GLOB SERPL: 2.2 (ref 1–2)
ALP LIVER SERPL-CCNC: 74 U/L (ref 46–118)
ALT SERPL-CCNC: 10 U/L (ref 10–49)
ANION GAP SERPL CALC-SCNC: 7 MMOL/L (ref 0–18)
ANION GAP SERPL CALC-SCNC: 7 MMOL/L (ref 0–18)
APTT PPP: 33 SECONDS (ref 23–36)
AST SERPL-CCNC: 28 U/L (ref ?–34)
BASOPHILS # BLD AUTO: 0 X10(3) UL (ref 0–0.2)
BASOPHILS NFR BLD AUTO: 0 %
BILIRUB SERPL-MCNC: 0.9 MG/DL (ref 0.3–1.2)
BLOOD TYPE BARCODE: 5100
BUN BLD-MCNC: 8 MG/DL (ref 9–23)
BUN BLD-MCNC: 8 MG/DL (ref 9–23)
BUN/CREAT SERPL: 17.4 (ref 10–20)
BUN/CREAT SERPL: 17.4 (ref 10–20)
CALCIUM BLD-MCNC: 7.5 MG/DL (ref 8.7–10.4)
CALCIUM BLD-MCNC: 7.5 MG/DL (ref 8.7–10.4)
CHLORIDE SERPL-SCNC: 104 MMOL/L (ref 98–112)
CHLORIDE SERPL-SCNC: 104 MMOL/L (ref 98–112)
CO2 SERPL-SCNC: 26 MMOL/L (ref 21–32)
CO2 SERPL-SCNC: 26 MMOL/L (ref 21–32)
CREAT BLD-MCNC: 0.46 MG/DL (ref 0.55–1.02)
CREAT BLD-MCNC: 0.46 MG/DL (ref 0.55–1.02)
DEPRECATED RDW RBC AUTO: 61.7 FL (ref 35.1–46.3)
EGFRCR SERPLBLD CKD-EPI 2021: 110 ML/MIN/1.73M2 (ref 60–?)
EGFRCR SERPLBLD CKD-EPI 2021: 110 ML/MIN/1.73M2 (ref 60–?)
EOSINOPHIL # BLD AUTO: 0 X10(3) UL (ref 0–0.7)
EOSINOPHIL NFR BLD AUTO: 0 %
ERYTHROCYTE [DISTWIDTH] IN BLOOD BY AUTOMATED COUNT: 15.8 % (ref 11–15)
GLOBULIN PLAS-MCNC: 1.6 G/DL (ref 2–3.5)
GLUCOSE BLD-MCNC: 138 MG/DL (ref 70–99)
GLUCOSE BLD-MCNC: 138 MG/DL (ref 70–99)
GLUCOSE BLDC GLUCOMTR-MCNC: 156 MG/DL (ref 70–99)
GLUCOSE BLDC GLUCOMTR-MCNC: 166 MG/DL (ref 70–99)
GLUCOSE BLDC GLUCOMTR-MCNC: 190 MG/DL (ref 70–99)
GLUCOSE BLDC GLUCOMTR-MCNC: 191 MG/DL (ref 70–99)
HCT VFR BLD AUTO: 23.1 % (ref 35–48)
HGB BLD-MCNC: 7.8 G/DL (ref 12–16)
IMM GRANULOCYTES # BLD AUTO: 0.04 X10(3) UL (ref 0–1)
IMM GRANULOCYTES NFR BLD: 0.4 %
INR BLD: 1.55 (ref 0.8–1.2)
LYMPHOCYTES # BLD AUTO: 0.73 X10(3) UL (ref 1–4)
LYMPHOCYTES NFR BLD AUTO: 7.9 %
MAGNESIUM SERPL-MCNC: 2 MG/DL (ref 1.6–2.6)
MCH RBC QN AUTO: 35.8 PG (ref 26–34)
MCHC RBC AUTO-ENTMCNC: 33.8 G/DL (ref 31–37)
MCV RBC AUTO: 106 FL (ref 80–100)
MONOCYTES # BLD AUTO: 0.62 X10(3) UL (ref 0.1–1)
MONOCYTES NFR BLD AUTO: 6.7 %
NEUTROPHILS # BLD AUTO: 7.8 X10 (3) UL (ref 1.5–7.7)
NEUTROPHILS # BLD AUTO: 7.8 X10(3) UL (ref 1.5–7.7)
NEUTROPHILS NFR BLD AUTO: 85 %
OSMOLALITY SERPL CALC.SUM OF ELEC: 285 MOSM/KG (ref 275–295)
OSMOLALITY SERPL CALC.SUM OF ELEC: 285 MOSM/KG (ref 275–295)
PHOSPHATE SERPL-MCNC: 1.9 MG/DL (ref 2.4–5.1)
PLATELET # BLD AUTO: 140 10(3)UL (ref 150–450)
POTASSIUM SERPL-SCNC: 3.5 MMOL/L (ref 3.5–5.1)
PROT SERPL-MCNC: 5.1 G/DL (ref 5.7–8.2)
PROTHROMBIN TIME: 19.4 SECONDS (ref 11.6–14.8)
RBC # BLD AUTO: 2.18 X10(6)UL (ref 3.8–5.3)
SODIUM SERPL-SCNC: 137 MMOL/L (ref 136–145)
SODIUM SERPL-SCNC: 137 MMOL/L (ref 136–145)
UNIT VOLUME: 250 ML
WBC # BLD AUTO: 9.2 X10(3) UL (ref 4–11)

## 2025-08-10 PROCEDURE — 99233 SBSQ HOSP IP/OBS HIGH 50: CPT | Performed by: INTERNAL MEDICINE

## 2025-08-10 PROCEDURE — 76770 US EXAM ABDO BACK WALL COMP: CPT | Performed by: INTERNAL MEDICINE

## 2025-08-10 PROCEDURE — 99233 SBSQ HOSP IP/OBS HIGH 50: CPT | Performed by: HOSPITALIST

## 2025-08-10 RX ORDER — ALBUMIN (HUMAN) 12.5 G/50ML
12.5 SOLUTION INTRAVENOUS EVERY 8 HOURS
Status: COMPLETED | OUTPATIENT
Start: 2025-08-10 | End: 2025-08-11

## 2025-08-10 RX ORDER — MEPERIDINE HYDROCHLORIDE 25 MG/ML
25 INJECTION INTRAMUSCULAR; INTRAVENOUS; SUBCUTANEOUS ONCE
Refills: 0 | Status: COMPLETED | OUTPATIENT
Start: 2025-08-10 | End: 2025-08-10

## 2025-08-11 ENCOUNTER — MED REC SCAN ONLY (OUTPATIENT)
Dept: FAMILY MEDICINE CLINIC | Facility: CLINIC | Age: 60
End: 2025-08-11

## 2025-08-11 PROBLEM — E46 MALNUTRITION (HCC): Status: ACTIVE | Noted: 2025-08-11

## 2025-08-11 LAB
ALBUMIN SERPL-MCNC: 3.6 G/DL (ref 3.2–4.8)
ALP LIVER SERPL-CCNC: 96 U/L (ref 46–118)
ALT SERPL-CCNC: 13 U/L (ref 10–49)
ANION GAP SERPL CALC-SCNC: 7 MMOL/L (ref 0–18)
AST SERPL-CCNC: 44 U/L (ref ?–34)
BASOPHILS # BLD AUTO: 0 X10(3) UL (ref 0–0.2)
BASOPHILS NFR BLD AUTO: 0 %
BILIRUB DIRECT SERPL-MCNC: 0.7 MG/DL (ref ?–0.3)
BILIRUB SERPL-MCNC: 1.3 MG/DL (ref 0.3–1.2)
BUN BLD-MCNC: 7 MG/DL (ref 9–23)
BUN/CREAT SERPL: 15.2 (ref 10–20)
CALCIUM BLD-MCNC: 8.1 MG/DL (ref 8.7–10.4)
CHLORIDE SERPL-SCNC: 103 MMOL/L (ref 98–112)
CO2 SERPL-SCNC: 27 MMOL/L (ref 21–32)
CREAT BLD-MCNC: 0.46 MG/DL (ref 0.55–1.02)
DEPRECATED RDW RBC AUTO: 59.7 FL (ref 35.1–46.3)
EGFRCR SERPLBLD CKD-EPI 2021: 110 ML/MIN/1.73M2 (ref 60–?)
EOSINOPHIL # BLD AUTO: 0.01 X10(3) UL (ref 0–0.7)
EOSINOPHIL NFR BLD AUTO: 0.2 %
ERYTHROCYTE [DISTWIDTH] IN BLOOD BY AUTOMATED COUNT: 15.4 % (ref 11–15)
GLUCOSE BLD-MCNC: 140 MG/DL (ref 70–99)
GLUCOSE BLDC GLUCOMTR-MCNC: 134 MG/DL (ref 70–99)
GLUCOSE BLDC GLUCOMTR-MCNC: 164 MG/DL (ref 70–99)
GLUCOSE BLDC GLUCOMTR-MCNC: 166 MG/DL (ref 70–99)
GLUCOSE BLDC GLUCOMTR-MCNC: 176 MG/DL (ref 70–99)
GLUCOSE BLDC GLUCOMTR-MCNC: 220 MG/DL (ref 70–99)
HCT VFR BLD AUTO: 24.7 % (ref 35–48)
HGB BLD-MCNC: 8.1 G/DL (ref 12–16)
IMM GRANULOCYTES # BLD AUTO: 0.02 X10(3) UL (ref 0–1)
IMM GRANULOCYTES NFR BLD: 0.3 %
LYMPHOCYTES # BLD AUTO: 0.98 X10(3) UL (ref 1–4)
LYMPHOCYTES NFR BLD AUTO: 16.2 %
MAGNESIUM SERPL-MCNC: 1.9 MG/DL (ref 1.6–2.6)
MCH RBC QN AUTO: 34.8 PG (ref 26–34)
MCHC RBC AUTO-ENTMCNC: 32.8 G/DL (ref 31–37)
MCV RBC AUTO: 106 FL (ref 80–100)
MONOCYTES # BLD AUTO: 0.55 X10(3) UL (ref 0.1–1)
MONOCYTES NFR BLD AUTO: 9.1 %
NEUTROPHILS # BLD AUTO: 4.5 X10 (3) UL (ref 1.5–7.7)
NEUTROPHILS # BLD AUTO: 4.5 X10(3) UL (ref 1.5–7.7)
NEUTROPHILS NFR BLD AUTO: 74.2 %
OSMOLALITY SERPL CALC.SUM OF ELEC: 284 MOSM/KG (ref 275–295)
PHOSPHATE SERPL-MCNC: 1.7 MG/DL (ref 2.4–5.1)
PHOSPHATE SERPL-MCNC: 1.7 MG/DL (ref 2.4–5.1)
PLATELET # BLD AUTO: 148 10(3)UL (ref 150–450)
POTASSIUM SERPL-SCNC: 3.1 MMOL/L (ref 3.5–5.1)
POTASSIUM SERPL-SCNC: 3.1 MMOL/L (ref 3.5–5.1)
PROT SERPL-MCNC: 5.1 G/DL (ref 5.7–8.2)
RBC # BLD AUTO: 2.33 X10(6)UL (ref 3.8–5.3)
SODIUM SERPL-SCNC: 137 MMOL/L (ref 136–145)
WBC # BLD AUTO: 6.1 X10(3) UL (ref 4–11)

## 2025-08-11 PROCEDURE — 99233 SBSQ HOSP IP/OBS HIGH 50: CPT | Performed by: INTERNAL MEDICINE

## 2025-08-11 PROCEDURE — 99232 SBSQ HOSP IP/OBS MODERATE 35: CPT | Performed by: INTERNAL MEDICINE

## 2025-08-11 PROCEDURE — 99233 SBSQ HOSP IP/OBS HIGH 50: CPT | Performed by: HOSPITALIST

## 2025-08-11 RX ORDER — ALBUMIN HUMAN 50 G/1000ML
12.5 SOLUTION INTRAVENOUS ONCE
Status: COMPLETED | OUTPATIENT
Start: 2025-08-11 | End: 2025-08-11

## 2025-08-11 RX ORDER — BUMETANIDE 0.25 MG/ML
2 INJECTION, SOLUTION INTRAMUSCULAR; INTRAVENOUS ONCE
Status: COMPLETED | OUTPATIENT
Start: 2025-08-11 | End: 2025-08-11

## 2025-08-11 RX ORDER — POTASSIUM CHLORIDE 14.9 MG/ML
20 INJECTION INTRAVENOUS ONCE
Status: COMPLETED | OUTPATIENT
Start: 2025-08-11 | End: 2025-08-11

## 2025-08-12 ENCOUNTER — APPOINTMENT (OUTPATIENT)
Dept: GENERAL RADIOLOGY | Facility: HOSPITAL | Age: 60
End: 2025-08-12
Attending: INTERNAL MEDICINE

## 2025-08-12 ENCOUNTER — APPOINTMENT (OUTPATIENT)
Dept: PICC SERVICES | Facility: HOSPITAL | Age: 60
End: 2025-08-12
Attending: PHYSICIAN ASSISTANT

## 2025-08-12 LAB
ANION GAP SERPL CALC-SCNC: 9 MMOL/L (ref 0–18)
BASOPHILS # BLD AUTO: 0.02 X10(3) UL (ref 0–0.2)
BASOPHILS NFR BLD AUTO: 0.3 %
BUN BLD-MCNC: 5 MG/DL (ref 9–23)
BUN/CREAT SERPL: 9.1 (ref 10–20)
CALCIUM BLD-MCNC: 8.2 MG/DL (ref 8.7–10.4)
CHLORIDE SERPL-SCNC: 101 MMOL/L (ref 98–112)
CO2 SERPL-SCNC: 27 MMOL/L (ref 21–32)
CREAT BLD-MCNC: 0.55 MG/DL (ref 0.55–1.02)
DEPRECATED RDW RBC AUTO: 57.4 FL (ref 35.1–46.3)
EGFRCR SERPLBLD CKD-EPI 2021: 106 ML/MIN/1.73M2 (ref 60–?)
EOSINOPHIL # BLD AUTO: 0.04 X10(3) UL (ref 0–0.7)
EOSINOPHIL NFR BLD AUTO: 0.6 %
ERYTHROCYTE [DISTWIDTH] IN BLOOD BY AUTOMATED COUNT: 14.9 % (ref 11–15)
GLUCOSE BLD-MCNC: 172 MG/DL (ref 70–99)
GLUCOSE BLDC GLUCOMTR-MCNC: 137 MG/DL (ref 70–99)
GLUCOSE BLDC GLUCOMTR-MCNC: 142 MG/DL (ref 70–99)
GLUCOSE BLDC GLUCOMTR-MCNC: 155 MG/DL (ref 70–99)
GLUCOSE BLDC GLUCOMTR-MCNC: 190 MG/DL (ref 70–99)
HCT VFR BLD AUTO: 28.6 % (ref 35–48)
HGB BLD-MCNC: 9.6 G/DL (ref 12–16)
IMM GRANULOCYTES # BLD AUTO: 0.03 X10(3) UL (ref 0–1)
IMM GRANULOCYTES NFR BLD: 0.4 %
LYMPHOCYTES # BLD AUTO: 1.08 X10(3) UL (ref 1–4)
LYMPHOCYTES NFR BLD AUTO: 15.4 %
MCH RBC QN AUTO: 35.6 PG (ref 26–34)
MCHC RBC AUTO-ENTMCNC: 33.6 G/DL (ref 31–37)
MCV RBC AUTO: 105.9 FL (ref 80–100)
MONOCYTES # BLD AUTO: 0.56 X10(3) UL (ref 0.1–1)
MONOCYTES NFR BLD AUTO: 8 %
NEUTROPHILS # BLD AUTO: 5.3 X10 (3) UL (ref 1.5–7.7)
NEUTROPHILS # BLD AUTO: 5.3 X10(3) UL (ref 1.5–7.7)
NEUTROPHILS NFR BLD AUTO: 75.3 %
OSMOLALITY SERPL CALC.SUM OF ELEC: 285 MOSM/KG (ref 275–295)
PHOSPHATE SERPL-MCNC: 1.8 MG/DL (ref 2.4–5.1)
PLATELET # BLD AUTO: 159 10(3)UL (ref 150–450)
POTASSIUM SERPL-SCNC: 3 MMOL/L (ref 3.5–5.1)
RBC # BLD AUTO: 2.7 X10(6)UL (ref 3.8–5.3)
SODIUM SERPL-SCNC: 137 MMOL/L (ref 136–145)
WBC # BLD AUTO: 7 X10(3) UL (ref 4–11)

## 2025-08-12 PROCEDURE — 99232 SBSQ HOSP IP/OBS MODERATE 35: CPT | Performed by: INTERNAL MEDICINE

## 2025-08-12 PROCEDURE — 71045 X-RAY EXAM CHEST 1 VIEW: CPT | Performed by: INTERNAL MEDICINE

## 2025-08-12 PROCEDURE — 99233 SBSQ HOSP IP/OBS HIGH 50: CPT | Performed by: HOSPITALIST

## 2025-08-12 RX ORDER — ERTAPENEM 1 G/1
1 INJECTION, POWDER, LYOPHILIZED, FOR SOLUTION INTRAMUSCULAR; INTRAVENOUS DAILY
Qty: 10 EACH | Refills: 0 | Status: SHIPPED | OUTPATIENT
Start: 2025-08-12 | End: 2025-08-22

## 2025-08-12 RX ORDER — OXYCODONE HYDROCHLORIDE 5 MG/1
5 TABLET ORAL EVERY 4 HOURS PRN
Qty: 20 TABLET | Refills: 0 | Status: SHIPPED | OUTPATIENT
Start: 2025-08-12 | End: 2025-08-17

## 2025-08-12 RX ORDER — OXYCODONE HYDROCHLORIDE 5 MG/1
5 TABLET ORAL EVERY 4 HOURS PRN
Refills: 0 | Status: DISCONTINUED | OUTPATIENT
Start: 2025-08-12 | End: 2025-08-13

## 2025-08-12 RX ORDER — OXYCODONE HYDROCHLORIDE 5 MG/1
2.5 TABLET ORAL EVERY 4 HOURS PRN
Refills: 0 | Status: DISCONTINUED | OUTPATIENT
Start: 2025-08-12 | End: 2025-08-13

## 2025-08-12 RX ORDER — OXYCODONE HYDROCHLORIDE 5 MG/1
10 TABLET ORAL EVERY 4 HOURS PRN
Refills: 0 | Status: DISCONTINUED | OUTPATIENT
Start: 2025-08-12 | End: 2025-08-13

## 2025-08-12 RX ORDER — BUMETANIDE 0.25 MG/ML
2 INJECTION, SOLUTION INTRAMUSCULAR; INTRAVENOUS ONCE
Status: COMPLETED | OUTPATIENT
Start: 2025-08-12 | End: 2025-08-12

## 2025-08-12 RX ORDER — ONDANSETRON 4 MG/1
4 TABLET, ORALLY DISINTEGRATING ORAL EVERY 4 HOURS PRN
Qty: 10 TABLET | Refills: 0 | Status: SHIPPED | OUTPATIENT
Start: 2025-08-12

## 2025-08-13 VITALS
SYSTOLIC BLOOD PRESSURE: 102 MMHG | DIASTOLIC BLOOD PRESSURE: 74 MMHG | HEIGHT: 64 IN | WEIGHT: 170.19 LBS | TEMPERATURE: 98 F | RESPIRATION RATE: 20 BRPM | OXYGEN SATURATION: 96 % | BODY MASS INDEX: 29.06 KG/M2 | HEART RATE: 90 BPM

## 2025-08-13 LAB
ANION GAP SERPL CALC-SCNC: 7 MMOL/L (ref 0–18)
BASOPHILS # BLD AUTO: 0.02 X10(3) UL (ref 0–0.2)
BASOPHILS NFR BLD AUTO: 0.3 %
BUN BLD-MCNC: <5 MG/DL (ref 9–23)
CALCIUM BLD-MCNC: 7.8 MG/DL (ref 8.7–10.4)
CHLORIDE SERPL-SCNC: 102 MMOL/L (ref 98–112)
CO2 SERPL-SCNC: 28 MMOL/L (ref 21–32)
CREAT BLD-MCNC: 0.53 MG/DL (ref 0.55–1.02)
DEPRECATED RDW RBC AUTO: 56.5 FL (ref 35.1–46.3)
EGFRCR SERPLBLD CKD-EPI 2021: 106 ML/MIN/1.73M2 (ref 60–?)
EOSINOPHIL # BLD AUTO: 0.05 X10(3) UL (ref 0–0.7)
EOSINOPHIL NFR BLD AUTO: 0.8 %
ERYTHROCYTE [DISTWIDTH] IN BLOOD BY AUTOMATED COUNT: 15.5 % (ref 11–15)
GLUCOSE BLD-MCNC: 130 MG/DL (ref 70–99)
GLUCOSE BLDC GLUCOMTR-MCNC: 124 MG/DL (ref 70–99)
GLUCOSE BLDC GLUCOMTR-MCNC: 155 MG/DL (ref 70–99)
HCT VFR BLD AUTO: 26 % (ref 35–48)
HGB BLD-MCNC: 8.8 G/DL (ref 12–16)
IMM GRANULOCYTES # BLD AUTO: 0.04 X10(3) UL (ref 0–1)
IMM GRANULOCYTES NFR BLD: 0.6 %
LYMPHOCYTES # BLD AUTO: 1.09 X10(3) UL (ref 1–4)
LYMPHOCYTES NFR BLD AUTO: 16.5 %
MAGNESIUM SERPL-MCNC: 1.2 MG/DL (ref 1.6–2.6)
MCH RBC QN AUTO: 35.3 PG (ref 26–34)
MCHC RBC AUTO-ENTMCNC: 33.8 G/DL (ref 31–37)
MCV RBC AUTO: 104.4 FL (ref 80–100)
MONOCYTES # BLD AUTO: 0.71 X10(3) UL (ref 0.1–1)
MONOCYTES NFR BLD AUTO: 10.8 %
NEUTROPHILS # BLD AUTO: 4.68 X10 (3) UL (ref 1.5–7.7)
NEUTROPHILS # BLD AUTO: 4.68 X10(3) UL (ref 1.5–7.7)
NEUTROPHILS NFR BLD AUTO: 71 %
PHOSPHATE SERPL-MCNC: 3.6 MG/DL (ref 2.4–5.1)
PLATELET # BLD AUTO: 144 10(3)UL (ref 150–450)
POTASSIUM SERPL-SCNC: 2.9 MMOL/L (ref 3.5–5.1)
POTASSIUM SERPL-SCNC: 2.9 MMOL/L (ref 3.5–5.1)
POTASSIUM SERPL-SCNC: 3 MMOL/L (ref 3.5–5.1)
POTASSIUM SERPL-SCNC: 3.7 MMOL/L (ref 3.5–5.1)
RBC # BLD AUTO: 2.49 X10(6)UL (ref 3.8–5.3)
SODIUM SERPL-SCNC: 137 MMOL/L (ref 136–145)
WBC # BLD AUTO: 6.6 X10(3) UL (ref 4–11)

## 2025-08-13 PROCEDURE — 99239 HOSP IP/OBS DSCHRG MGMT >30: CPT | Performed by: HOSPITALIST

## 2025-08-13 PROCEDURE — 99232 SBSQ HOSP IP/OBS MODERATE 35: CPT | Performed by: INTERNAL MEDICINE

## 2025-08-13 RX ORDER — CYCLOBENZAPRINE HCL 10 MG
10 TABLET ORAL 3 TIMES DAILY PRN
Qty: 21 TABLET | Refills: 0 | Status: SHIPPED | OUTPATIENT
Start: 2025-08-13

## 2025-08-13 RX ORDER — CYCLOBENZAPRINE HCL 5 MG
7.5 TABLET ORAL ONCE
Status: COMPLETED | OUTPATIENT
Start: 2025-08-13 | End: 2025-08-13

## 2025-08-13 RX ORDER — MAGNESIUM OXIDE 400 MG/1
800 TABLET ORAL ONCE
Status: COMPLETED | OUTPATIENT
Start: 2025-08-13 | End: 2025-08-13

## 2025-08-13 RX ORDER — POTASSIUM CHLORIDE 1500 MG/1
40 TABLET, EXTENDED RELEASE ORAL EVERY 4 HOURS
Status: COMPLETED | OUTPATIENT
Start: 2025-08-13 | End: 2025-08-13

## 2025-08-14 ENCOUNTER — PATIENT OUTREACH (OUTPATIENT)
Dept: CASE MANAGEMENT | Age: 60
End: 2025-08-14

## 2025-08-14 ENCOUNTER — TELEPHONE (OUTPATIENT)
Dept: SURGERY | Facility: CLINIC | Age: 60
End: 2025-08-14

## 2025-08-14 ENCOUNTER — PATIENT OUTREACH (OUTPATIENT)
Age: 60
End: 2025-08-14

## 2025-08-14 DIAGNOSIS — K57.92 ACUTE DIVERTICULITIS: Primary | ICD-10-CM

## 2025-08-14 RX ORDER — HYDROCODONE BITARTRATE AND ACETAMINOPHEN 5; 325 MG/1; MG/1
1-2 TABLET ORAL EVERY 6 HOURS PRN
Qty: 20 TABLET | Refills: 0 | Status: SHIPPED | OUTPATIENT
Start: 2025-08-14

## 2025-08-17 DIAGNOSIS — K57.92 ACUTE DIVERTICULITIS: ICD-10-CM

## 2025-08-18 ENCOUNTER — PATIENT MESSAGE (OUTPATIENT)
Dept: FAMILY MEDICINE CLINIC | Facility: CLINIC | Age: 60
End: 2025-08-18

## 2025-08-18 RX ORDER — OXYCODONE HYDROCHLORIDE 5 MG/1
5 TABLET ORAL EVERY 4 HOURS PRN
Qty: 20 TABLET | Refills: 0 | Status: SHIPPED | OUTPATIENT
Start: 2025-08-18

## 2025-08-18 RX ORDER — FLUCONAZOLE 100 MG/1
TABLET ORAL
Qty: 11 TABLET | Refills: 0 | Status: SHIPPED | OUTPATIENT
Start: 2025-08-18 | End: 2025-08-28

## 2025-08-19 ENCOUNTER — TELEPHONE (OUTPATIENT)
Dept: FAMILY MEDICINE CLINIC | Facility: CLINIC | Age: 60
End: 2025-08-19

## 2025-08-19 ENCOUNTER — MED REC SCAN ONLY (OUTPATIENT)
Dept: FAMILY MEDICINE CLINIC | Facility: CLINIC | Age: 60
End: 2025-08-19

## 2025-08-21 ENCOUNTER — TELEPHONE (OUTPATIENT)
Dept: SURGERY | Facility: CLINIC | Age: 60
End: 2025-08-21

## 2025-08-21 DIAGNOSIS — K57.92 ACUTE DIVERTICULITIS: Primary | ICD-10-CM

## 2025-08-21 DIAGNOSIS — K57.92 ACUTE DIVERTICULITIS: ICD-10-CM

## 2025-08-21 RX ORDER — HYDROCODONE BITARTRATE AND ACETAMINOPHEN 5; 325 MG/1; MG/1
1-2 TABLET ORAL EVERY 6 HOURS PRN
Qty: 20 TABLET | Refills: 0 | OUTPATIENT
Start: 2025-08-21

## 2025-08-22 ENCOUNTER — OFFICE VISIT (OUTPATIENT)
Dept: SURGERY | Facility: CLINIC | Age: 60
End: 2025-08-22

## 2025-08-22 VITALS
SYSTOLIC BLOOD PRESSURE: 122 MMHG | DIASTOLIC BLOOD PRESSURE: 82 MMHG | HEART RATE: 124 BPM | RESPIRATION RATE: 16 BRPM | OXYGEN SATURATION: 98 %

## 2025-08-22 DIAGNOSIS — K57.92 ACUTE DIVERTICULITIS: Primary | ICD-10-CM

## 2025-08-22 PROCEDURE — 99024 POSTOP FOLLOW-UP VISIT: CPT | Performed by: SURGERY

## 2025-08-22 RX ORDER — OXYCODONE HYDROCHLORIDE 5 MG/1
5 TABLET ORAL EVERY 4 HOURS PRN
Qty: 45 TABLET | Refills: 0 | Status: SHIPPED | OUTPATIENT
Start: 2025-08-22

## 2025-08-25 ENCOUNTER — TELEPHONE (OUTPATIENT)
Dept: SURGERY | Facility: CLINIC | Age: 60
End: 2025-08-25

## 2025-08-25 DIAGNOSIS — K57.92 ACUTE DIVERTICULITIS: Primary | ICD-10-CM

## 2025-08-27 ENCOUNTER — DOCUMENTATION ONLY (OUTPATIENT)
Facility: CLINIC | Age: 60
End: 2025-08-27

## 2025-08-28 ENCOUNTER — TELEPHONE (OUTPATIENT)
Dept: SURGERY | Facility: CLINIC | Age: 60
End: 2025-08-28

## (undated) DIAGNOSIS — F51.05 INSOMNIA SECONDARY TO ANXIETY: ICD-10-CM

## (undated) DIAGNOSIS — F41.1 GENERALIZED ANXIETY DISORDER: ICD-10-CM

## (undated) DIAGNOSIS — F41.9 INSOMNIA SECONDARY TO ANXIETY: ICD-10-CM

## (undated) DEVICE — Device

## (undated) DEVICE — SOLUTION IRRIG 3000ML 0.9% NACL FLX CONT

## (undated) DEVICE — EVACUATOR SUR 100CC SIL BLB WND

## (undated) DEVICE — SUT COAT VCRL+ 3-0 18IN RB-1 ABSRB VLT ANTIBA

## (undated) DEVICE — COUNTER NDL 10 CT HLD 20 FOAM BLK ADH

## (undated) DEVICE — DRAPE EXT W21XH19XL10.5IN DA VINCI XI

## (undated) DEVICE — HANDLE SUR BLU PLAS LT FLX SLIP ON ST DISP

## (undated) DEVICE — SOLUTION IRRIG 1000ML ST H2O AQUALITE PLAS

## (undated) DEVICE — BAG DRNGE 2000ML URIN INF CTRL ANTIREFLX

## (undated) DEVICE — DRAPE SUR W53XL77IN STD POLYPR SMS 3 QTR SHT

## (undated) DEVICE — SYSTEM SEMI RIG LNR 3000CC W/ EL AND SELF

## (undated) DEVICE — PACK CDS A P RESECTION

## (undated) DEVICE — APPLICATOR PREP 26ML CHG 2% ISO ALC 70%

## (undated) DEVICE — CATHETER URETH 16FR RED RUB INTMIT ALL PURP

## (undated) DEVICE — KIT OST POSTOP MOLD TECH 57MM INVISI NATURA

## (undated) DEVICE — GLOVE SUR 7 SENSICARE PI PIP CRM PWD F

## (undated) DEVICE — SOLUTION ANTIFOG VIS SYS CLEARIFY LAPSCP

## (undated) DEVICE — SUT COAT VCRL 0 27IN CT-1 ABSRB VLT 36MM 1/2

## (undated) DEVICE — TRAP MCS 40ML 5IN PLS SCR CAP

## (undated) DEVICE — SUT PERMA- 3-0 30IN SH NABSRB BLK 26MM 1/2

## (undated) DEVICE — JELLY LUB 2OZ GREASELESS FLIP TOP DISP

## (undated) DEVICE — SOLUTION IRRIG 1000ML 0.9% NACL USP BTL

## (undated) DEVICE — SUT PROL 2-0 30IN SH NABSRB BLU L26MM 1/2 CIR

## (undated) DEVICE — SUT PROL 0 30IN CT-1 NABSRB BLU L36MM 1/2 CIR

## (undated) DEVICE — DRAPE SURG W109XL110IN UNIV STD ABD/PELV 100%

## (undated) DEVICE — TRAY PREP WET SKIN 4 COMPARTMENT 6 SPNG 2 TWL

## (undated) DEVICE — NEEDLE INSUF 13GA L120MM LAP SPR LD BLNT STYL

## (undated) DEVICE — PASSER SUT NDL 14GA 5-8MM TRCR 10-12MM SWAB

## (undated) DEVICE — LIGASURE LAP MARYLAND 44CM

## (undated) DEVICE — SUT MCRYL 4-0 18IN PS-2 ABSRB UD 19MM 3/8 CIR

## (undated) DEVICE — SUT COAT VCRL + 0 54IN ABSRB UD ANTIBACT

## (undated) DEVICE — GLOVE SUR 7.5 SENSICARE PI PIP GRN PWD F

## (undated) DEVICE — NEEDLE SPNL 20GA L3.5IN YEL QNCKE STYL DISP

## (undated) DEVICE — SUT PROL 4-0 36IN RB-1 NABSRB BLU 17MM 1/2 CI

## (undated) DEVICE — DRAPE SURG STERI ISOLATION LRG

## (undated) DEVICE — TRAY CATH FOLEY 16FR INCLUDE BARDX IC COMPLT CARE

## (undated) NOTE — ED AVS SNAPSHOT
Gloria Spencer   MRN: [id number]    Department:  THE [facility] Emergency Department in [place]   Date of Visit:  9/8/2017           Disclosure     Insurance plans vary and the physician(s) referred by the ER may not be covered by your plan.  Please cont If you have been prescribed any medication(s), please fill your prescription right away and begin taking the medication(s) as directed    If the emergency physician has read X-rays, these will be re-interpreted by a radiologist.  If there is a significant

## (undated) NOTE — ED AVS SNAPSHOT
[facility] Emergency Department in [address] [facility]    Phone:  [phone]    Fax:  [phone]           Renuka Ryder   MRN: [id number]    Department:  [facility] Emergency Department in [facility]   Date of [facility] AT [facility] metRONIDAZOLE 500 MG Tabs   Quantity:  30 tablet   Commonly known as:  FLAGYL   Take 1 tablet (500 mg total) by mouth 3 (three) times daily.             Where to Get Your Medications      You can get these medications from any pharmacy     Bring a paper pr to a primary care or a specialist physician for a follow-up visit, please tell this physician (or your personal doctor if your instructions are to return to your personal doctor) about any new or lasting problems.  The primary care or specialist physician w We are concerned for your overall well being:    - If you are a smoker or have smoked in the last 12 months, we encourage you to explore options for quitting.     - If you have concerns related to behavioral health issues or thoughts of harming yourself, region abd pain since yesterday. Patient states no nausea,vomiting or diarrhea. Patient is recently diagnosed with diverticulitis.       CONTRAST USED:  88 cc used  cc of Omnipaque 350     CONTRAST USED: 88 mL of Omnipaque 350 through the left antecubital

## (undated) NOTE — Clinical Note
Tell patient she left the office before blood pressure recheck to be done I need her to have her blood pressure rechecked either at [facility] where an [facility] staff is or here with the nurse only during her lunch hour

## (undated) NOTE — LETTER
[facility] CARE IN [place]  [address] [address]  Dept: [phone]  Dept Fax: [phone]         February 21, 2017    Patient: Ryan Julio   YOB: 1965   Date of Visit: 2/21/2017       To Whom It May Co

## (undated) NOTE — Clinical Note
Date: 2/24/2017    Patient Name: Herbert Kuhn          To Whom it may concern: This letter has been written at the patient's request. The above patient was seen at the [facility] for treatment of a medical condition.     She is cleare

## (undated) NOTE — LETTER
12/10/18        [address] [place] [address]      Dear Santana Maricel,    [address] [facility] records indicate that you have outstanding lab work and or testing that was ordered for you and has not yet been completed:        LEILA HILLIARD 2D+3D DIAGNOS

## (undated) NOTE — ED AVS SNAPSHOT
Kristen Francisco   MRN: [id number]    Department:  [facility] Emergency Department in [place]   Date of Visit:  9/7/2017           Disclosure     Insurance plans vary and the physician(s) referred by the ER may not be covered by your plan.  Please cont If you have been prescribed any medication(s), please fill your prescription right away and begin taking the medication(s) as directed    If the emergency physician has read X-rays, these will be re-interpreted by a radiologist.  If there is a significant

## (undated) NOTE — ED AVS SNAPSHOT
Alejandro Molina   MRN: [id number]    Department:  [facility] Emergency Department in [place]   Date of Visit:  1/16/2019           Disclosure     Insurance plans vary and the physician(s) referred by the ER may not be covered by your plan.  Please con tell this physician (or your personal doctor if your instructions are to return to your personal doctor) about any new or lasting problems. The primary care or specialist physician will see patients referred from the [facility] Emergency Department.  Ciaran Ponce

## (undated) NOTE — ED AVS SNAPSHOT
THE [facility] Emergency Department in [address] [facility] [address]    Phone:  [phone]    Fax:  [phone]           Michelle Gray   MRN: [id number]    Department:  THE [facility] Emergency Department in [place]   Date of [facility]ENCY DEPARTMENT AT [facility] IF THERE IS ANY CHANGE OR WORSENING OF YOUR CONDITION, CALL YOUR PRIMARY CARE PHYSICIAN AT ONCE OR RETURN IMMEDIATELY TO THE EMERGENCY DEPARTMENT.     If you have been prescribed any medication(s), please fill your prescription right away and begin taking t

## (undated) NOTE — ED AVS SNAPSHOT
[facility] in [address]    Phone:  [phone]    Fax:  [phone]           Yazan Figueroa   MRN: [id number]    Department:  THE [facility] in [facility]   Date of Visit:  2/21/2017 Ciprofloxacin HCl 500 MG Tabs   Quantity:  20 tablet   Commonly known as:  CIPRO   Take 1 tablet (500 mg total) by mouth 2 (two) times daily. TAKE ONE TAB TWICE DAILY WITH FOOD.        metRONIDAZOLE 500 MG Tabs   Quantity:  20 tablet   Commonly known as: Insurance plans vary and the physician(s) referred by the Immediate Care may not be covered by your plan. Please contact your insurance company to determine coverage for follow-up care and referrals. [facility]  [address] If you have been prescribed any medication(s), please fill your prescription right away and begin taking the medication(s) as directed.     If the Immediate Care Provider has read X-rays, these will be re-interpreted by a radiologist.  If there is a signifi can help with your Affordable Care Act coverage, as well as all types of Medicaid plans. To get signed up and covered, please call [phone] and ask to get set up for an insurance coverage that is in-network with [facility].         Maggi AORTA/VASCULAR:  Trace vascular calcification, no aneurysm. RETROPERITONEUM:  There are a few subcentimeter retroperitoneal lymph nodes.   BOWEL/MESENTERY:  There is a 8 to 10centimeter segment of acute diverticulitis involving the distal descending colon

## (undated) NOTE — LETTER
September 8, 2017    Patient: Gerson Casey   Date of Visit: 9/8/2017       To Whom It May Concern:    Donna Wyatt was seen and treated in our emergency department on 9/8/2017. She can return to work 9/11/17.     If you have any questions or c

## (undated) NOTE — LETTER
11/13/18        Renuka Ryder  [address] [place] [address]      Dear Dolly Mcgowan records indicate that you have outstanding lab work and or testing that was ordered for you and has not yet been completed:        GGT (Gamma Glutamyl Tr

## (undated) NOTE — LETTER
12/21/17        [address]      Dear Oni Lauren records indicate that you have outstanding lab work and or testing that was ordered for you and has not yet been completed:        HBA1C  To provide you

## (undated) NOTE — LETTER
17    To Whom It May Concern:    Re: Kwaku Richards   10/12/1965      Patient is under my care and has been extended time off from work thru 17. Pt is to return to work on 17. Pt next appointment in my office is 17.       Si

## (undated) NOTE — Clinical Note
03/12/2017        [address]      Dear Lyssa Ozuna records indicate that you have outstanding lab work and or testing that was ordered for you and has not yet been completed:      Comp Metabolic Panel (

## (undated) NOTE — Clinical Note
FYI, TCM call made see notes. NCM scheduled TCM HFU on 9/24/18 with Darshan Alvarez per pt's request. Condition update sent.

## (undated) NOTE — LETTER
03/11/19        Marlinda Romberg  [address] [place] [address]      Dear Sofie Landau,    [facility] records indicate that you have outstanding lab work and or testing that was ordered for you and has not yet been completed:        CBC W Cristian W P

## (undated) NOTE — LETTER
Tobacco:  She currently smokes tobacco.  Social History    Tobacco Use      Smoking status: Every Day        Packs/day: 0.50        Years: 29.00        Additional pack years: 0.00        Total pack years: 14.50        Types: Cigarettes      Smokeless tobacco: Never     Tobacco Cessation Documentation (Smoking and Smokeless included):  I had an in depth therapy session with June Goncalves about her tobacco use risks and options using the [employer]PSTF's Five A's approach:    Ask: June is using tobacco products.  Assess: We asked about/assessed behavioral health risk and factors affecting choice of behavior change goals/methods.  Specifically I asked about readiness to quit tobacco.  Advise: We gave clear, specific, and personalized behavior change advice, including information about personal health harms and benefits. Specifically, she was told that Quitting tobacco is one of the best things she can do for her health. I strongly encouraged her to quit.      Agree: We collaboratively selected appropriate treatment goals and methods based on the patient’s interest in and willingness to change the behavior.   Assist: We used behavior change techniques (self-help and/or counseling), to aid June in achieving agreed-upon goals by acquiring the skills, confidence, and social/environmental supports for behavior change, supplemented with adjunctive medical treatments when appropriate. Additionally, national quitting tobacco aides were discussed.   Arrange: Follow up at next visit- see specific follow up below.    Time Counseled: 3 minutes

## (undated) NOTE — LETTER
Date: 2/10/2023    Patient Name: Clara Dodge          To Whom it may concern: This letter has been written at the patient's request. The above patient was seen at the [facility] for treatment of a medical condition. This patient should be excused from attending work from 1/30/23-2/19/23. The patient may return to work on 2/20/23 with no restrictions.         Sincerely,    Maryann Burgos PA-C

## (undated) NOTE — MR AVS SNAPSHOT
[facility]  [phone]               Thank you for choosing us for your health care visit with Magaly Boogie PA-C.   We are glad to serve you and happy to provide you with [facility] Commonly known as:  LEXAPRO           Losartan Potassium-HCTZ 100-12.5 MG Tabs   Take 1 tablet by mouth daily.    Commonly known as:  HYZAAR           omeprazole 20 MG Cpdr   TAKE ONE CAPSULE BY MOUTH EVERY MORNING   Commonly known as:  PRILOSEC           P

## (undated) NOTE — LETTER
Date: 2/9/2023    Patient Name: Gatito Elizabeth          To Whom it may concern: This letter has been written at the patient's request. The above patient is a patient at this [facility] for treatment of a medical condition. This patient should be excused from attending work from 1/30/23-2/12/23. The patient may return to work on 2/13/23 with no limitations.          Sincerely,    Elena Adams PA-C

## (undated) NOTE — LETTER
Date: 2017    Patient Name: Masoud Estrada    10/12/1965          To Whom it may concern: The above patient was seen at the [facility] for treatment of a medical condition.     This patient should be excused from attending work

## (undated) NOTE — LETTER
Date: 12/17/2018    Patient Name: Jak Cohen          To Whom it may concern: The above patient was seen at the [facility] for treatment of a medical condition. This patient should be excused from attending work Monday 12/17/18.

## (undated) NOTE — LETTER
September 7, 2017    Patient: Serena Russo   Date of Visit: 9/7/2017       To Whom It May Concern:    Sudarshan Rodas was seen and treated in our emergency department on 9/7/2017. She can return to work. tomorrow.     If you have any questions or

## (undated) NOTE — ED AVS SNAPSHOT
Herbert Kuhn   MRN: [id number]    Department:  [facility] Emergency Department in [place]   Date of Visit:  8/21/2017           Disclosure     Insurance plans vary and the physician(s) referred by the ER may not be covered by your plan.  Please con If you have been prescribed any medication(s), please fill your prescription right away and begin taking the medication(s) as directed    If the emergency physician has read X-rays, these will be re-interpreted by a radiologist.  If there is a significant

## (undated) NOTE — LETTER
12/13/18        [address] [place] [address]      Dear Mayi Kaba records indicate that you have outstanding lab work and or testing that was ordered for you and has not yet been completed:          BONE DENSITOMETRY